# Patient Record
Sex: FEMALE | Race: WHITE | NOT HISPANIC OR LATINO | URBAN - METROPOLITAN AREA
[De-identification: names, ages, dates, MRNs, and addresses within clinical notes are randomized per-mention and may not be internally consistent; named-entity substitution may affect disease eponyms.]

---

## 2018-07-04 ENCOUNTER — EMERGENCY (EMERGENCY)
Facility: HOSPITAL | Age: 83
LOS: 1 days | Discharge: LEFT BEFORE DISCHARGE | End: 2018-07-04
Attending: EMERGENCY MEDICINE
Payer: MEDICARE

## 2018-07-04 VITALS
TEMPERATURE: 98 F | HEIGHT: 63 IN | SYSTOLIC BLOOD PRESSURE: 96 MMHG | OXYGEN SATURATION: 97 % | DIASTOLIC BLOOD PRESSURE: 58 MMHG | HEART RATE: 60 BPM | WEIGHT: 110.01 LBS | RESPIRATION RATE: 16 BRPM

## 2018-07-04 LAB
ALBUMIN SERPL ELPH-MCNC: 3.7 G/DL — SIGNIFICANT CHANGE UP (ref 3.5–5)
ALP SERPL-CCNC: 62 U/L — SIGNIFICANT CHANGE UP (ref 40–120)
ALT FLD-CCNC: 19 U/L DA — SIGNIFICANT CHANGE UP (ref 10–60)
ANION GAP SERPL CALC-SCNC: 12 MMOL/L — SIGNIFICANT CHANGE UP (ref 5–17)
APTT BLD: 36.9 SEC — SIGNIFICANT CHANGE UP (ref 27.5–37.4)
AST SERPL-CCNC: 24 U/L — SIGNIFICANT CHANGE UP (ref 10–40)
BASOPHILS # BLD AUTO: 0.2 K/UL — SIGNIFICANT CHANGE UP (ref 0–0.2)
BASOPHILS NFR BLD AUTO: 1.5 % — SIGNIFICANT CHANGE UP (ref 0–2)
BILIRUB SERPL-MCNC: 0.6 MG/DL — SIGNIFICANT CHANGE UP (ref 0.2–1.2)
BUN SERPL-MCNC: 32 MG/DL — HIGH (ref 7–18)
CALCIUM SERPL-MCNC: 9.2 MG/DL — SIGNIFICANT CHANGE UP (ref 8.4–10.5)
CHLORIDE SERPL-SCNC: 110 MMOL/L — HIGH (ref 96–108)
CK MB CFR SERPL CALC: 2.6 NG/ML — SIGNIFICANT CHANGE UP (ref 0–3.6)
CO2 SERPL-SCNC: 22 MMOL/L — SIGNIFICANT CHANGE UP (ref 22–31)
CREAT SERPL-MCNC: 1.61 MG/DL — HIGH (ref 0.5–1.3)
EOSINOPHIL # BLD AUTO: 0.2 K/UL — SIGNIFICANT CHANGE UP (ref 0–0.5)
EOSINOPHIL NFR BLD AUTO: 1.6 % — SIGNIFICANT CHANGE UP (ref 0–6)
GLUCOSE SERPL-MCNC: 108 MG/DL — HIGH (ref 70–99)
HCT VFR BLD CALC: 39.6 % — SIGNIFICANT CHANGE UP (ref 34.5–45)
HGB BLD-MCNC: 12.8 G/DL — SIGNIFICANT CHANGE UP (ref 11.5–15.5)
INR BLD: 0.96 RATIO — SIGNIFICANT CHANGE UP (ref 0.88–1.16)
LYMPHOCYTES # BLD AUTO: 1.9 K/UL — SIGNIFICANT CHANGE UP (ref 1–3.3)
LYMPHOCYTES # BLD AUTO: 19 % — SIGNIFICANT CHANGE UP (ref 13–44)
MAGNESIUM SERPL-MCNC: 2.2 MG/DL — SIGNIFICANT CHANGE UP (ref 1.6–2.6)
MCHC RBC-ENTMCNC: 30.6 PG — SIGNIFICANT CHANGE UP (ref 27–34)
MCHC RBC-ENTMCNC: 32.2 GM/DL — SIGNIFICANT CHANGE UP (ref 32–36)
MCV RBC AUTO: 94.8 FL — SIGNIFICANT CHANGE UP (ref 80–100)
MONOCYTES # BLD AUTO: 0.9 K/UL — SIGNIFICANT CHANGE UP (ref 0–0.9)
MONOCYTES NFR BLD AUTO: 8.8 % — SIGNIFICANT CHANGE UP (ref 2–14)
NEUTROPHILS # BLD AUTO: 6.9 K/UL — SIGNIFICANT CHANGE UP (ref 1.8–7.4)
NEUTROPHILS NFR BLD AUTO: 69.1 % — SIGNIFICANT CHANGE UP (ref 43–77)
NT-PROBNP SERPL-SCNC: 350 PG/ML — SIGNIFICANT CHANGE UP (ref 0–450)
PLATELET # BLD AUTO: 210 K/UL — SIGNIFICANT CHANGE UP (ref 150–400)
POTASSIUM SERPL-MCNC: 4 MMOL/L — SIGNIFICANT CHANGE UP (ref 3.5–5.3)
POTASSIUM SERPL-SCNC: 4 MMOL/L — SIGNIFICANT CHANGE UP (ref 3.5–5.3)
PROT SERPL-MCNC: 7.7 G/DL — SIGNIFICANT CHANGE UP (ref 6–8.3)
PROTHROM AB SERPL-ACNC: 10.5 SEC — SIGNIFICANT CHANGE UP (ref 9.8–12.7)
RBC # BLD: 4.17 M/UL — SIGNIFICANT CHANGE UP (ref 3.8–5.2)
RBC # FLD: 13.5 % — SIGNIFICANT CHANGE UP (ref 10.3–14.5)
SODIUM SERPL-SCNC: 144 MMOL/L — SIGNIFICANT CHANGE UP (ref 135–145)
TROPONIN I SERPL-MCNC: <0.015 NG/ML — SIGNIFICANT CHANGE UP (ref 0–0.04)
WBC # BLD: 10 K/UL — SIGNIFICANT CHANGE UP (ref 3.8–10.5)
WBC # FLD AUTO: 10 K/UL — SIGNIFICANT CHANGE UP (ref 3.8–10.5)

## 2018-07-04 PROCEDURE — 99284 EMERGENCY DEPT VISIT MOD MDM: CPT | Mod: 25

## 2018-07-04 PROCEDURE — 72125 CT NECK SPINE W/O DYE: CPT | Mod: 26

## 2018-07-04 PROCEDURE — 71045 X-RAY EXAM CHEST 1 VIEW: CPT

## 2018-07-04 PROCEDURE — 83735 ASSAY OF MAGNESIUM: CPT

## 2018-07-04 PROCEDURE — 93005 ELECTROCARDIOGRAM TRACING: CPT

## 2018-07-04 PROCEDURE — 70450 CT HEAD/BRAIN W/O DYE: CPT | Mod: 26

## 2018-07-04 PROCEDURE — 82962 GLUCOSE BLOOD TEST: CPT

## 2018-07-04 PROCEDURE — 70450 CT HEAD/BRAIN W/O DYE: CPT

## 2018-07-04 PROCEDURE — 83880 ASSAY OF NATRIURETIC PEPTIDE: CPT

## 2018-07-04 PROCEDURE — 80053 COMPREHEN METABOLIC PANEL: CPT

## 2018-07-04 PROCEDURE — 85730 THROMBOPLASTIN TIME PARTIAL: CPT

## 2018-07-04 PROCEDURE — 85610 PROTHROMBIN TIME: CPT

## 2018-07-04 PROCEDURE — 84484 ASSAY OF TROPONIN QUANT: CPT

## 2018-07-04 PROCEDURE — 93010 ELECTROCARDIOGRAM REPORT: CPT

## 2018-07-04 PROCEDURE — 71045 X-RAY EXAM CHEST 1 VIEW: CPT | Mod: 26

## 2018-07-04 PROCEDURE — 82553 CREATINE MB FRACTION: CPT

## 2018-07-04 PROCEDURE — 85027 COMPLETE CBC AUTOMATED: CPT

## 2018-07-04 PROCEDURE — 99285 EMERGENCY DEPT VISIT HI MDM: CPT

## 2018-07-04 PROCEDURE — 72125 CT NECK SPINE W/O DYE: CPT

## 2018-07-04 RX ORDER — SODIUM CHLORIDE 9 MG/ML
2000 INJECTION INTRAMUSCULAR; INTRAVENOUS; SUBCUTANEOUS ONCE
Qty: 0 | Refills: 0 | Status: DISCONTINUED | OUTPATIENT
Start: 2018-07-04 | End: 2018-07-08

## 2018-07-04 NOTE — ED PROVIDER NOTE - PHYSICAL EXAMINATION
Gen: Alert, NAD  Head: NC, AT   Eyes: PERRL, EOMI, normal lids/conjunctiva  ENT: normal hearing, patent oropharynx without erythema/exudate, uvula midline  Neck: supple, no tenderness, Trachea midline  Pulm: Bilateral BS, normal resp effort, no wheeze/stridor/retractions  CV: RRR, 2/6 systolic murmur best heard lusb. 2+ radial and dp pulses bl, no edema  Abd: soft, NT/ND, +BS, no hepatosplenomegaly  Mskel: extremities x4 with normal ROM and no joint effusions. no ctl spine ttp.   Skin: no rash, no bruising   Neuro: AAOx3, no sensory/motor deficits, CN 2-12 intact

## 2018-07-04 NOTE — ED PROVIDER NOTE - OBJECTIVE STATEMENT
Pertinent PMH/PSH/FHx/SHx and Review of Systems contained within:  86F hx htn on losartan and atenolol pw syncope or fall. per ems the patient had a syncope. but patient notes that she slipped on something. she denies hitting her head. patient reports no cp, sob, ha, vision change, dizziness, nausea, vomiting, fatigue, fever, chills, dysuria, abd pain. patient reports she was on her way to go shopping when this occurred and the  called 911  Fh and Sh not otherwise contributory  ROS otherwise negative

## 2018-07-04 NOTE — ED PROVIDER NOTE - PROGRESS NOTE DETAILS
patient appears to have eloped. I called her cell phone but not response. I called the son's emergency number, again no response.

## 2018-07-04 NOTE — ED PROVIDER NOTE - MEDICAL DECISION MAKING DETAILS
patient pw syncope or fall. given the disputed claims, will err on the side of caution and work her up for syncope given her age. I read her ekg as snius rate 55, no st elevation or depression, no qtc or pr prolongation, no axis deviation. Patient is notably hypotensive and so dehydration may have played a component in this.

## 2018-07-04 NOTE — ED ADULT NURSE REASSESSMENT NOTE - NS ED NURSE REASSESS COMMENT FT1
pt sheba Cannon supervisor made aware Tele. phone  called several times without AN ANSWER. SONS NO WAS ALSO CALLED  WITHOUT nd answer

## 2020-02-13 PROBLEM — Z00.00 ENCOUNTER FOR PREVENTIVE HEALTH EXAMINATION: Status: ACTIVE | Noted: 2020-02-13

## 2020-03-31 ENCOUNTER — APPOINTMENT (OUTPATIENT)
Dept: NEUROLOGY | Facility: CLINIC | Age: 85
End: 2020-03-31

## 2021-03-19 ENCOUNTER — INPATIENT (INPATIENT)
Facility: HOSPITAL | Age: 86
LOS: 3 days | Discharge: HOME | DRG: 309 | End: 2021-03-23
Attending: INTERNAL MEDICINE | Admitting: INTERNAL MEDICINE
Payer: COMMERCIAL

## 2021-03-19 VITALS
HEIGHT: 63 IN | DIASTOLIC BLOOD PRESSURE: 95 MMHG | RESPIRATION RATE: 16 BRPM | SYSTOLIC BLOOD PRESSURE: 193 MMHG | OXYGEN SATURATION: 96 % | HEART RATE: 86 BPM

## 2021-03-19 DIAGNOSIS — Z29.9 ENCOUNTER FOR PROPHYLACTIC MEASURES, UNSPECIFIED: ICD-10-CM

## 2021-03-19 DIAGNOSIS — R55 SYNCOPE AND COLLAPSE: ICD-10-CM

## 2021-03-19 DIAGNOSIS — I48.91 UNSPECIFIED ATRIAL FIBRILLATION: ICD-10-CM

## 2021-03-19 DIAGNOSIS — I10 ESSENTIAL (PRIMARY) HYPERTENSION: ICD-10-CM

## 2021-03-19 LAB
ALBUMIN SERPL ELPH-MCNC: 3.2 G/DL — LOW (ref 3.5–5)
ALP SERPL-CCNC: 73 U/L — SIGNIFICANT CHANGE UP (ref 40–120)
ALT FLD-CCNC: 18 U/L DA — SIGNIFICANT CHANGE UP (ref 10–60)
ANION GAP SERPL CALC-SCNC: 9 MMOL/L — SIGNIFICANT CHANGE UP (ref 5–17)
APPEARANCE UR: CLEAR — SIGNIFICANT CHANGE UP
AST SERPL-CCNC: 20 U/L — SIGNIFICANT CHANGE UP (ref 10–40)
BASOPHILS # BLD AUTO: 0.08 K/UL — SIGNIFICANT CHANGE UP (ref 0–0.2)
BASOPHILS NFR BLD AUTO: 0.7 % — SIGNIFICANT CHANGE UP (ref 0–2)
BILIRUB SERPL-MCNC: 0.4 MG/DL — SIGNIFICANT CHANGE UP (ref 0.2–1.2)
BILIRUB UR-MCNC: NEGATIVE — SIGNIFICANT CHANGE UP
BUN SERPL-MCNC: 19 MG/DL — HIGH (ref 7–18)
CALCIUM SERPL-MCNC: 9 MG/DL — SIGNIFICANT CHANGE UP (ref 8.4–10.5)
CHLORIDE SERPL-SCNC: 109 MMOL/L — HIGH (ref 96–108)
CO2 SERPL-SCNC: 23 MMOL/L — SIGNIFICANT CHANGE UP (ref 22–31)
COLOR SPEC: YELLOW — SIGNIFICANT CHANGE UP
CREAT SERPL-MCNC: 1.02 MG/DL — SIGNIFICANT CHANGE UP (ref 0.5–1.3)
DIFF PNL FLD: ABNORMAL
EOSINOPHIL # BLD AUTO: 0.08 K/UL — SIGNIFICANT CHANGE UP (ref 0–0.5)
EOSINOPHIL NFR BLD AUTO: 0.7 % — SIGNIFICANT CHANGE UP (ref 0–6)
GLUCOSE SERPL-MCNC: 96 MG/DL — SIGNIFICANT CHANGE UP (ref 70–99)
GLUCOSE UR QL: NEGATIVE — SIGNIFICANT CHANGE UP
HCT VFR BLD CALC: 38.6 % — SIGNIFICANT CHANGE UP (ref 34.5–45)
HGB BLD-MCNC: 12.5 G/DL — SIGNIFICANT CHANGE UP (ref 11.5–15.5)
IMM GRANULOCYTES NFR BLD AUTO: 1 % — SIGNIFICANT CHANGE UP (ref 0–1.5)
KETONES UR-MCNC: ABNORMAL
LEUKOCYTE ESTERASE UR-ACNC: NEGATIVE — SIGNIFICANT CHANGE UP
LYMPHOCYTES # BLD AUTO: 1.71 K/UL — SIGNIFICANT CHANGE UP (ref 1–3.3)
LYMPHOCYTES # BLD AUTO: 15.5 % — SIGNIFICANT CHANGE UP (ref 13–44)
MCHC RBC-ENTMCNC: 29.5 PG — SIGNIFICANT CHANGE UP (ref 27–34)
MCHC RBC-ENTMCNC: 32.4 GM/DL — SIGNIFICANT CHANGE UP (ref 32–36)
MCV RBC AUTO: 91 FL — SIGNIFICANT CHANGE UP (ref 80–100)
MONOCYTES # BLD AUTO: 0.81 K/UL — SIGNIFICANT CHANGE UP (ref 0–0.9)
MONOCYTES NFR BLD AUTO: 7.4 % — SIGNIFICANT CHANGE UP (ref 2–14)
NEUTROPHILS # BLD AUTO: 8.22 K/UL — HIGH (ref 1.8–7.4)
NEUTROPHILS NFR BLD AUTO: 74.7 % — SIGNIFICANT CHANGE UP (ref 43–77)
NITRITE UR-MCNC: NEGATIVE — SIGNIFICANT CHANGE UP
NRBC # BLD: 0 /100 WBCS — SIGNIFICANT CHANGE UP (ref 0–0)
PH UR: 6 — SIGNIFICANT CHANGE UP (ref 5–8)
PLATELET # BLD AUTO: 214 K/UL — SIGNIFICANT CHANGE UP (ref 150–400)
POTASSIUM SERPL-MCNC: 4.2 MMOL/L — SIGNIFICANT CHANGE UP (ref 3.5–5.3)
POTASSIUM SERPL-SCNC: 4.2 MMOL/L — SIGNIFICANT CHANGE UP (ref 3.5–5.3)
PROT SERPL-MCNC: 7.2 G/DL — SIGNIFICANT CHANGE UP (ref 6–8.3)
PROT UR-MCNC: 30 MG/DL
RBC # BLD: 4.24 M/UL — SIGNIFICANT CHANGE UP (ref 3.8–5.2)
RBC # FLD: 13 % — SIGNIFICANT CHANGE UP (ref 10.3–14.5)
SARS-COV-2 RNA SPEC QL NAA+PROBE: SIGNIFICANT CHANGE UP
SODIUM SERPL-SCNC: 141 MMOL/L — SIGNIFICANT CHANGE UP (ref 135–145)
SP GR SPEC: 1.02 — SIGNIFICANT CHANGE UP (ref 1.01–1.02)
TROPONIN I SERPL-MCNC: <0.015 NG/ML — SIGNIFICANT CHANGE UP (ref 0–0.04)
TSH SERPL-MCNC: 3.32 UU/ML — SIGNIFICANT CHANGE UP (ref 0.34–4.82)
UROBILINOGEN FLD QL: NEGATIVE — SIGNIFICANT CHANGE UP
WBC # BLD: 11.01 K/UL — HIGH (ref 3.8–10.5)
WBC # FLD AUTO: 11.01 K/UL — HIGH (ref 3.8–10.5)

## 2021-03-19 PROCEDURE — 99285 EMERGENCY DEPT VISIT HI MDM: CPT

## 2021-03-19 PROCEDURE — 93010 ELECTROCARDIOGRAM REPORT: CPT

## 2021-03-19 PROCEDURE — 70450 CT HEAD/BRAIN W/O DYE: CPT | Mod: 26

## 2021-03-19 PROCEDURE — 93880 EXTRACRANIAL BILAT STUDY: CPT | Mod: 26

## 2021-03-19 PROCEDURE — 72125 CT NECK SPINE W/O DYE: CPT | Mod: 26

## 2021-03-19 PROCEDURE — 99223 1ST HOSP IP/OBS HIGH 75: CPT

## 2021-03-19 RX ORDER — HALOPERIDOL DECANOATE 100 MG/ML
0.5 INJECTION INTRAMUSCULAR ONCE
Refills: 0 | Status: COMPLETED | OUTPATIENT
Start: 2021-03-19 | End: 2021-03-19

## 2021-03-19 RX ORDER — ATENOLOL 25 MG/1
50 TABLET ORAL DAILY
Refills: 0 | Status: DISCONTINUED | OUTPATIENT
Start: 2021-03-19 | End: 2021-03-23

## 2021-03-19 RX ORDER — DONEPEZIL HYDROCHLORIDE 10 MG/1
5 TABLET, FILM COATED ORAL AT BEDTIME
Refills: 0 | Status: DISCONTINUED | OUTPATIENT
Start: 2021-03-19 | End: 2021-03-23

## 2021-03-19 RX ORDER — ENOXAPARIN SODIUM 100 MG/ML
40 INJECTION SUBCUTANEOUS DAILY
Refills: 0 | Status: DISCONTINUED | OUTPATIENT
Start: 2021-03-19 | End: 2021-03-23

## 2021-03-19 RX ORDER — RISPERIDONE 4 MG/1
1 TABLET ORAL
Refills: 0 | Status: DISCONTINUED | OUTPATIENT
Start: 2021-03-19 | End: 2021-03-23

## 2021-03-19 RX ORDER — VALSARTAN 80 MG/1
160 TABLET ORAL DAILY
Refills: 0 | Status: DISCONTINUED | OUTPATIENT
Start: 2021-03-19 | End: 2021-03-21

## 2021-03-19 RX ORDER — SODIUM CHLORIDE 9 MG/ML
1000 INJECTION INTRAMUSCULAR; INTRAVENOUS; SUBCUTANEOUS ONCE
Refills: 0 | Status: COMPLETED | OUTPATIENT
Start: 2021-03-19 | End: 2021-03-19

## 2021-03-19 RX ORDER — ASPIRIN/CALCIUM CARB/MAGNESIUM 324 MG
81 TABLET ORAL DAILY
Refills: 0 | Status: DISCONTINUED | OUTPATIENT
Start: 2021-03-19 | End: 2021-03-23

## 2021-03-19 RX ORDER — ONDANSETRON 8 MG/1
4 TABLET, FILM COATED ORAL ONCE
Refills: 0 | Status: COMPLETED | OUTPATIENT
Start: 2021-03-19 | End: 2021-03-19

## 2021-03-19 RX ORDER — HYDRALAZINE HCL 50 MG
5 TABLET ORAL ONCE
Refills: 0 | Status: COMPLETED | OUTPATIENT
Start: 2021-03-19 | End: 2021-03-19

## 2021-03-19 RX ORDER — HALOPERIDOL DECANOATE 100 MG/ML
1 INJECTION INTRAMUSCULAR ONCE
Refills: 0 | Status: COMPLETED | OUTPATIENT
Start: 2021-03-19 | End: 2021-03-19

## 2021-03-19 RX ADMIN — Medication 5 MILLIGRAM(S): at 12:03

## 2021-03-19 RX ADMIN — SODIUM CHLORIDE 1000 MILLILITER(S): 9 INJECTION INTRAMUSCULAR; INTRAVENOUS; SUBCUTANEOUS at 08:40

## 2021-03-19 RX ADMIN — HALOPERIDOL DECANOATE 0.5 MILLIGRAM(S): 100 INJECTION INTRAMUSCULAR at 14:30

## 2021-03-19 RX ADMIN — ONDANSETRON 4 MILLIGRAM(S): 8 TABLET, FILM COATED ORAL at 10:19

## 2021-03-19 RX ADMIN — RISPERIDONE 1 MILLIGRAM(S): 4 TABLET ORAL at 17:42

## 2021-03-19 RX ADMIN — ENOXAPARIN SODIUM 40 MILLIGRAM(S): 100 INJECTION SUBCUTANEOUS at 11:24

## 2021-03-19 RX ADMIN — SODIUM CHLORIDE 1000 MILLILITER(S): 9 INJECTION INTRAMUSCULAR; INTRAVENOUS; SUBCUTANEOUS at 11:53

## 2021-03-19 RX ADMIN — HALOPERIDOL DECANOATE 1 MILLIGRAM(S): 100 INJECTION INTRAMUSCULAR at 11:22

## 2021-03-19 RX ADMIN — DONEPEZIL HYDROCHLORIDE 5 MILLIGRAM(S): 10 TABLET, FILM COATED ORAL at 21:28

## 2021-03-19 NOTE — CONSULT NOTE ADULT - SUBJECTIVE AND OBJECTIVE BOX
Pt is demented, providing limited information.  History per Admission H&P earlier today.    <Start of quotes from H&P>  "Reason for Admission: Fall/Syncope  History of Present Illness:   89 yr F from home with PMHx of HTN, ?Afib, dementia presents to ED after fall vs Syncope. Pt has baseline dementia therefore unable to provide details of the event; unable to reach family. Pt states she thinks she slipped on dust in her kitchen but isn't sure what happened. Pt states her neighbor called 911. Pt states she was in usual state of health when she went to sleep. Pt denies any pain anywhere in the body, headache, numbness, tingling, weakness, chest pain, SOB, palpitations, N/V/D abdominal pain, urinary symptoms or any other acute complaints.     In ED, /85, HR 92     Review of Systems:  Other Review of Systems: All other review of systems negative, except as noted in HPI    Allergies and Intolerances:        Allergies:  	No Known Allergies:     Home Medications:   * Patient Currently Takes Medications as of 19-Mar-2021 10:38 documented in Structured Notes  · 	donepezil 5 mg oral tablet: 1 tab(s) orally once a day (at bedtime)  · 	atenolol 50 mg oral tablet: 1 tab(s) orally once a day  · 	risperiDONE 1 mg oral tablet: 1 tab(s) orally 2 times a day  · 	valsartan 160 mg oral tablet: 1 tab(s) orally once a day    Patient History:    Past Medical, Past Surgical, and Family History:  PAST MEDICAL HISTORY:  HTN (hypertension).     Social History:  Social History (marital status, living situation, occupation, tobacco use, alcohol and drug use, and sexual history): Denies smoking, alcohol or illicit drug use     Tobacco Screening:  · Core Measure Site	Yes  · Has the patient used tobacco in the past 30 days?	No  . . .   Physical Exam: General - NAD  . . .   Neurological – Alert and oriented x 3, CN 2-12 grossly intact  . . . "  <End of quotes from H&P>      Of interest is the following from the ED Provider note of 7/4/2018, the only other time Pt was in hospital in [facility]:    ". . .   Chief Complaint: The patient is a 86y Female complaining of syncope.  HPI Objective Statement: Pertinent PMH/PSH/FHx/SHx and Review of Systems contained within:  	86F hx htn on losartan and atenolol pw syncope or fall. per ems the patient had a syncope. but patient notes that she slipped on something. she denies hitting her head. patient reports no cp, sob, ha, vision change, dizziness, nausea, vomiting, fatigue, fever, chills, dysuria, abd pain. patient reports she was on her way to go shopping when this occurred and the  called 911  Fh and Sh not otherwise contributory  . . .  Neuro: AAOx3, no sensory/motor deficits, CN 2-12 intact  . . .  Progress: patient appears to have eloped. I called her cell phone but not response. I called the son's emergency number, again no response.  . . .   Medical Decision Making Details: patient pw syncope or fall. given the disputed claims, will err on the side of caution and work her up for syncope given her age. I read her ekg as snius rate 55, no st elevation or depression, no qtc or pr prolongation, no axis deviation. Patient is notably hypotensive and so dehydration may have played a component in this  . . ."      Per radiology report of head and c-spine CTs:  "COMPARISON: CT head and C-spine 7/4/2018.    FINDINGS:    CT HEAD:  No acute transcortical infarct or intracranial hemorrhage.    Confluent white matter hypoattenuating foci are noted, nonspecific but likely representing small vessel disease.    The ventricles are normal without evidence of hydrocephalus. There are no extra-axial fluid collections.    Status post right cataract surgery. Orbits are otherwise unremarkable. The imaged portions of the paranasal sinuses are clear. The mastoid air cells are clear. The visualized soft tissues and osseous structures appear normal.    CT CERVICAL SPINE:  No acute fracture or acute subluxation.  Multilevel degenerative changes. Disc osteophyte complex most prominent at C4-C5, indenting the ventral cord, grossly unchanged. MRI may be obtained, if the patient is MRI compatible if further information regarding spinal canal soft tissue contents is required."      TSH 4.97  3.32  Tot prot/alb  7.3/3.4      EXAMINATION     Pt is demented, providing limited information.  History per Admission H&P earlier today.    <Start of quotes from H&P>  "Reason for Admission: Fall/Syncope  History of Present Illness:   89 yr F from home with PMHx of HTN, ?Afib, dementia presents to ED after fall vs Syncope. Pt has baseline dementia therefore unable to provide details of the event; unable to reach family. Pt states she thinks she slipped on dust in her kitchen but isn't sure what happened. Pt states her neighbor called 911. Pt states she was in usual state of health when she went to sleep. Pt denies any pain anywhere in the body, headache, numbness, tingling, weakness, chest pain, SOB, palpitations, N/V/D abdominal pain, urinary symptoms or any other acute complaints.     In ED, /85, HR 92     Review of Systems:  Other Review of Systems: All other review of systems negative, except as noted in HPI    Allergies and Intolerances:        Allergies:  	No Known Allergies:     Home Medications:   * Patient Currently Takes Medications as of 19-Mar-2021 10:38 documented in Structured Notes  · 	donepezil 5 mg oral tablet: 1 tab(s) orally once a day (at bedtime)  · 	atenolol 50 mg oral tablet: 1 tab(s) orally once a day  · 	risperiDONE 1 mg oral tablet: 1 tab(s) orally 2 times a day  · 	valsartan 160 mg oral tablet: 1 tab(s) orally once a day    Patient History:    Past Medical, Past Surgical, and Family History:  PAST MEDICAL HISTORY:  HTN (hypertension).     Social History:  Social History (marital status, living situation, occupation, tobacco use, alcohol and drug use, and sexual history): Denies smoking, alcohol or illicit drug use     Tobacco Screening:  · Core Measure Site	Yes  · Has the patient used tobacco in the past 30 days?	No  . . .   Physical Exam: General - NAD  . . .   Neurological – Alert and oriented x 3, CN 2-12 grossly intact  . . . "  <End of quotes from H&P>      Of interest is the following from the ED Provider note of 7/4/2018, the only other time Pt was in hospital in [facility]:    ". . .   Chief Complaint: The patient is a 86y Female complaining of syncope.  HPI Objective Statement: Pertinent PMH/PSH/FHx/SHx and Review of Systems contained within:  	86F hx htn on losartan and atenolol pw syncope or fall. per ems the patient had a syncope. but patient notes that she slipped on something. she denies hitting her head. patient reports no cp, sob, ha, vision change, dizziness, nausea, vomiting, fatigue, fever, chills, dysuria, abd pain. patient reports she was on her way to go shopping when this occurred and the  called 911  Fh and Sh not otherwise contributory  . . .  Neuro: AAOx3, no sensory/motor deficits, CN 2-12 intact  . . .  Progress: patient appears to have eloped. I called her cell phone but not response. I called the son's emergency number, again no response.  . . .   Medical Decision Making Details: patient pw syncope or fall. given the disputed claims, will err on the side of caution and work her up for syncope given her age. I read her ekg as snius rate 55, no st elevation or depression, no qtc or pr prolongation, no axis deviation. Patient is notably hypotensive and so dehydration may have played a component in this  . . ."      Per radiology report of head and c-spine CTs:  "COMPARISON: CT head and C-spine 7/4/2018.    FINDINGS:    CT HEAD:  No acute transcortical infarct or intracranial hemorrhage.    Confluent white matter hypoattenuating foci are noted, nonspecific but likely representing small vessel disease.    The ventricles are normal without evidence of hydrocephalus. There are no extra-axial fluid collections.    Status post right cataract surgery. Orbits are otherwise unremarkable. The imaged portions of the paranasal sinuses are clear. The mastoid air cells are clear. The visualized soft tissues and osseous structures appear normal.    CT CERVICAL SPINE:  No acute fracture or acute subluxation.  Multilevel degenerative changes. Disc osteophyte complex most prominent at C4-C5, indenting the ventral cord, grossly unchanged. MRI may be obtained, if the patient is MRI compatible if further information regarding spinal canal soft tissue contents is required."      TSH 4.97  3.32  Tot prot/alb  7.3/3.4      EXAMINATION    Thin, emaciated.  Found asleep, supine in bed.  Arousable.  Taciturn.  Marked psychomotor retardation.  Weak voice.  Gives her name in piecemeal fashion.  Responds that she feels "OK."  When asked her age says "I don't give my age."  Asked where we are, she pauses then says "looking for . . . . . "  Current year = "oh."  Pupils 3/3.5 mm on R/L, min reactive.       Symmetric generalized marked weakness of limb muscles.      Reflex                           Right    Left   Comment    Biceps                             3         3  low amplitude  Triceps                            3         3  low amplitude  [place]                       absent  absent  Patellar                          ?0       ?0  Gastroc                           0         0  Plantar                         flexor   flexor      Sensory testing not feasible due to cognitive impairment.     [place] not want to try to sit up.  Falls backwards when passively sat up in bed (due to instability or to lack ot co-operation?).     Constant symmetric synchronous 5 Hz rhythmic flex/ext tremor at wrists/elbows.  Tremor at ankles during passive movement of the LEs.

## 2021-03-19 NOTE — ED PROVIDER NOTE - OBJECTIVE STATEMENT
89 year old female PMH HTN coming in s/p fall vs. syncope. pt states she thinks she slipped on dust in her kitchen but then isn't sure what happened. [facility] neighbor called 911. at this time denies all complaints.  was in usual state of health when she went to sleep. denies headache, numbness, tingling, weakness, cp, sob, palpitations, msk pain, urinary complaints, abd pains, N/v/D/C.

## 2021-03-19 NOTE — H&P ADULT - HISTORY OF PRESENT ILLNESS
89 yr F from home with PMHx of HTN, ?Afib, dementia presents to ED after fall vs Syncope. Pt has baseline dementia therefore unable to provide details of the event; unable to reach family. Pt states she thinks she slipped on dust in her kitchen but isn't sure what happened. Pt states her neighbor called 911. Pt states she was in usual state of health when she went to sleep. Pt denies any pain anywhere in the body, headache, numbness, tingling, weakness, chest pain, SOB, palpitations, N/V/D abdominal pain, urinary symptoms or any other acute complaints.     In ED, /85, HR 92

## 2021-03-19 NOTE — ED PROVIDER NOTE - PHYSICAL EXAMINATION
no signs of acute head trauma  no midline ttp neck or back  tremor in b/l hands.  full ROM all joints with no pain.  no bony ttp in extremities.  PERRLA. EOMI b/l.

## 2021-03-19 NOTE — ED PROVIDER NOTE - PROGRESS NOTE DETAILS
BARRY: signed out to me by Dr. Everett. Possible syncope. Pending CT head and C-spine, EKG. Plan to admit. Patient is resting comfortably, NAD. Patient oriented to person but not place or time. Tremulous. Does not recall home meds or PMD. Tried to call emergency contact - number out of service. Called number listed for patient and left a voicemail. Endorsed to Dr. Huerta.

## 2021-03-19 NOTE — H&P ADULT - PROBLEM SELECTOR PLAN 2
Unknown if pt has a H/O Afib   EKG shows Atrial fibrillation  Troponin negative  Tele monitoring  c/w Atenolol  Cardio Dr. Garcia Unknown if pt has a H/O Afib, pt said she halways had irregular HR   EKG shows Atrial fibrillation  Troponin negative  Tele monitoring  c/w Atenolol  Not on any AC  Cardio Dr. Garcia Unknown if pt has a H/O Afib, pt said she halways had irregular HR   EKG shows Atrial fibrillation  CHADVASc score 4 (holding AC due to fall risk, risks outweigh benefit)  Troponin negative  Tele monitoring  c/w Atenolol  Not on any AC  Cardio Dr. Garcia

## 2021-03-19 NOTE — H&P ADULT - PROBLEM SELECTOR PLAN 1
p/w ?Syncope vs Fall  CT head: no acute changes  CT C spine: no acute fracture  EKG shows Atrial fibrillation  Troponin negative  Tele monitoring  f/u US Carotid doppler  f/u Echo   f/u Orthostatic vitals  Neuro Dr. Murillo  Cardio Dr. Garcia  PT evaluation p/w ?Syncope vs Fall  CT head: no acute changes  CT C spine: no acute fracture  EKG shows Atrial fibrillation  Troponin negative  Tele monitoring  f/u US Carotid doppler  f/u Echo   f/u Orthostatic vitals  Neuro Dr. Murillo  PT evaluation

## 2021-03-19 NOTE — CONSULT NOTE ADULT - ASSESSMENT
1) Persistent Atrial Fibrillation  - Continue atenolol  - Patient noted not on anticoagulation. Unclear if new onset afib or known afib. However in the setting of recurrent falls, and noted tremors on exam(parkinsonian from antipsychotics?) would continue to hold on anticoagulation for now. Patient also noted to be confused, climbing out of bed. Risks of anticoagulation at this moment would outweigh benefits.  - Patient has a CHADSVASC Score of at least 4.   - Obtain echocardiogram    2) Fall  - Noted with resting tremors of bilateral upper extremities  - Suspect due to antipsychotic, risperidone?  - Patient continues to be agitated, given haldol in ED

## 2021-03-19 NOTE — CONSULT NOTE ADULT - SUBJECTIVE AND OBJECTIVE BOX
CHIEF COMPLAINT & BRIEF HOSPITAL COURSE:  89 yr F from home with PMHx of HTN, ?Afib, dementia presents to ED after fall vs Syncope. Pt has baseline dementia therefore unable to provide details of the event; unable to reach family. Pt states she thinks she slipped on dust in her kitchen but isn't sure what happened. Pt states her neighbor called 911. Pt states she was in usual state of health when she went to sleep. Pt denies any pain anywhere in the body, headache, numbness, tingling, weakness, chest pain, SOB, palpitations, N/V/D abdominal pain, urinary symptoms or any other acute complaints.     In ED, /85, HR 92    INTERVAL HPI/OVERNIGHT EVENTS:   Patient's BP improved with hydralazine push and resuming home medications. Attempted to obtain history however patient appeared paranoid, answering minimal questions, asking "why." However patient does not seem to remember her circumstances regarding her admission.     REVIEW OF SYSTEMS:  Unable to assess    Vital Signs Last 24 Hrs  T(C): 36.3 (19 Mar 2021 15:48), Max: 36.7 (19 Mar 2021 11:32)  T(F): 97.3 (19 Mar 2021 15:48), Max: 98 (19 Mar 2021 11:32)  HR: 76 (19 Mar 2021 15:48) (70 - 92)  BP: 161/61 (19 Mar 2021 15:48) (140/65 - 193/95)  BP(mean): --  RR: 18 (19 Mar 2021 15:48) (16 - 20)  SpO2: 98% (19 Mar 2021 15:48) (95% - 99%)    PHYSICAL EXAMINATION:  GENERAL: frail elderly, confused, Has bilateral resting tremors(antiparkinsonian effects from antipsychotics?)  NECK: Supple, No JVD, Normal thyroid  CHEST/LUNG: clear to auscultation bilaterally  HEART: irregular, rate controlled  ABDOMEN: Soft, Nontender, Nondistended; Bowel sounds present  NERVOUS SYSTEM:  Alert & Oriented X 1-2  EXTREMITIES:  2+ Peripheral Pulses, No clubbing, cyanosis, or edema  SKIN: warm dry                          12.5   11.01 )-----------( 214      ( 19 Mar 2021 06:38 )             38.6     03-19    141  |  109<H>  |  19<H>  ----------------------------<  96  4.2   |  23  |  1.02    Ca    9.0      19 Mar 2021 06:38    TPro  7.2  /  Alb  3.2<L>  /  TBili  0.4  /  DBili  x   /  AST  20  /  ALT  18  /  AlkPhos  73  03-19    LIVER FUNCTIONS - ( 19 Mar 2021 06:38 )  Alb: 3.2 g/dL / Pro: 7.2 g/dL / ALK PHOS: 73 U/L / ALT: 18 U/L DA / AST: 20 U/L / GGT: x           CARDIAC MARKERS ( 19 Mar 2021 06:38 )  <0.015 ng/mL / x     / x     / x     / x              CAPILLARY BLOOD GLUCOSE      RADIOLOGY & ADDITIONAL TESTS:

## 2021-03-19 NOTE — ED PROVIDER NOTE - CLINICAL SUMMARY MEDICAL DECISION MAKING FREE TEXT BOX
89 year old female s/p fall vs. syncope. hypertensive. PE as above.  labs, UA, ct head/cervical spine, ecg, reassess

## 2021-03-19 NOTE — CONSULT NOTE ADULT - ASSESSMENT
To be completed        B12, folate, methylmalonic acid, homocysteine, SPEP, ESR, CRP, MARIELA, thiamine level, syphilis serology.  Dementia, unknown duration, unknown prior work-up if any.  Given the nebulous answers she gave percy last seen at [facility], in 2018, she was already cognitively impaired if not outright demented, then.      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       Negativistic behavior on this exam; likely was also negativistic in 2018 given that she eloped after arriving here having falling down.      Hypothyroidism.      Reportedly "Alert and oriented x 3, CN 2-12 grossly intact" in ED not long before this evaluation.         RECOMMENDATIONS    B12, folate, methylmalonic acid, homocysteine, SPEP, ESR, CRP, MARIELA, thiamine level, syphilis serology, FT3, FT4, ammonia, GGT.     Non-con MRI c-spine (may need sedation). Dementia, unknown duration, unknown prior work-up if any.  Given the nebulous answers she gave when last seen at [facility], in 2018, she was already cognitively impaired if not outright demented, then.      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       Negativistic behavior on this exam; likely was also negativistic in 2018 given that she eloped after arriving here having falling down.      Hypothyroidism.      Reportedly "Alert and oriented x 3, CN 2-12 grossly intact" in ED not long before this evaluation.         RECOMMENDATIONS    B12, folate, methylmalonic acid, homocysteine, SPEP, ESR, CRP, MARIELA, thiamine level, syphilis serology, FT3, FT4, ammonia, GGT.     Non-con MRI c-spine (may need sedation). Dementia, unknown duration, unknown prior work-up if any.  Given the nebulous answers she gave when last seen at [facility], in 2018, she was already cognitively impaired if not outright demented, then.      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       Negativistic behavior on this exam; likely was also negativistic in 2018 given that she eloped after arriving here having falling down.      Hypothyroidism.      Reportedly "Alert and oriented x 3, CN 2-12 grossly intact" in ED not long before this evaluation.         RECOMMENDATIONS    B12, folate, methylmalonic acid, homocysteine, SPEP, ESR, CRP, MARIELA, thiamine level, syphilis serology, FT3, FT4, ammonia, GGT (I have ordered these).     Non-con MRI c-spine (may need sedation). Dementia, unknown duration, unknown prior work-up if any.  Given the nebulous answers she gave when last seen at [facility], in 2018, she was already cognitively impaired if not outright demented, then.      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       Negativistic behavior on this exam; likely was also negativistic in 2018 given that she eloped after arriving here having falling down.      Hypothyroidism.      Malnutrition.    Reportedly "Alert and oriented x 3, CN 2-12 grossly intact" in ED not long before this evaluation.         RECOMMENDATIONS    B12, folate, methylmalonic acid, homocysteine, SPEP, ESR, CRP, MARIELA, thiamine level, syphilis serology, FT3, FT4, ammonia, GGT (I have ordered these).     Non-con MRI c-spine (may need sedation).

## 2021-03-19 NOTE — H&P ADULT - ASSESSMENT
89 yr F from home with PMHx of HTN, ?Afib, dementia presents to ED after fall vs Syncope.     In ED, /85, HR 92      Pt admitted for Syncope

## 2021-03-20 LAB
ALBUMIN SERPL ELPH-MCNC: 3.4 G/DL — LOW (ref 3.5–5)
ALP SERPL-CCNC: 77 U/L — SIGNIFICANT CHANGE UP (ref 40–120)
ALT FLD-CCNC: 18 U/L DA — SIGNIFICANT CHANGE UP (ref 10–60)
AMMONIA BLD-MCNC: 21 UMOL/L — SIGNIFICANT CHANGE UP (ref 11–32)
ANION GAP SERPL CALC-SCNC: 9 MMOL/L — SIGNIFICANT CHANGE UP (ref 5–17)
AST SERPL-CCNC: 25 U/L — SIGNIFICANT CHANGE UP (ref 10–40)
BASOPHILS # BLD AUTO: 0.06 K/UL — SIGNIFICANT CHANGE UP (ref 0–0.2)
BASOPHILS NFR BLD AUTO: 0.5 % — SIGNIFICANT CHANGE UP (ref 0–2)
BILIRUB SERPL-MCNC: 0.6 MG/DL — SIGNIFICANT CHANGE UP (ref 0.2–1.2)
BUN SERPL-MCNC: 16 MG/DL — SIGNIFICANT CHANGE UP (ref 7–18)
CALCIUM SERPL-MCNC: 9.1 MG/DL — SIGNIFICANT CHANGE UP (ref 8.4–10.5)
CHLORIDE SERPL-SCNC: 105 MMOL/L — SIGNIFICANT CHANGE UP (ref 96–108)
CHOLEST SERPL-MCNC: 277 MG/DL — HIGH
CO2 SERPL-SCNC: 26 MMOL/L — SIGNIFICANT CHANGE UP (ref 22–31)
COVID-19 SPIKE DOMAIN AB INTERP: NEGATIVE — SIGNIFICANT CHANGE UP
COVID-19 SPIKE DOMAIN ANTIBODY RESULT: 0.4 U/ML — SIGNIFICANT CHANGE UP
CREAT SERPL-MCNC: 1.12 MG/DL — SIGNIFICANT CHANGE UP (ref 0.5–1.3)
CRP SERPL-MCNC: 7 MG/L — HIGH
CULTURE RESULTS: SIGNIFICANT CHANGE UP
EOSINOPHIL # BLD AUTO: 0.04 K/UL — SIGNIFICANT CHANGE UP (ref 0–0.5)
EOSINOPHIL NFR BLD AUTO: 0.3 % — SIGNIFICANT CHANGE UP (ref 0–6)
ERYTHROCYTE [SEDIMENTATION RATE] IN BLOOD: 23 MM/HR — HIGH (ref 0–20)
FERRITIN SERPL-MCNC: 333 NG/ML — HIGH (ref 15–150)
FOLATE SERPL-MCNC: >20 NG/ML — SIGNIFICANT CHANGE UP
FOLATE SERPL-MCNC: >20 NG/ML — SIGNIFICANT CHANGE UP
GGT SERPL-CCNC: 7 U/L — LOW (ref 8–40)
GLUCOSE SERPL-MCNC: 100 MG/DL — HIGH (ref 70–99)
HCT VFR BLD CALC: 37.2 % — SIGNIFICANT CHANGE UP (ref 34.5–45)
HDLC SERPL-MCNC: 105 MG/DL — SIGNIFICANT CHANGE UP
HGB BLD-MCNC: 12.5 G/DL — SIGNIFICANT CHANGE UP (ref 11.5–15.5)
IMM GRANULOCYTES NFR BLD AUTO: 0.6 % — SIGNIFICANT CHANGE UP (ref 0–1.5)
LIPID PNL WITH DIRECT LDL SERPL: 155 MG/DL — HIGH
LYMPHOCYTES # BLD AUTO: 1.48 K/UL — SIGNIFICANT CHANGE UP (ref 1–3.3)
LYMPHOCYTES # BLD AUTO: 12.8 % — LOW (ref 13–44)
MAGNESIUM SERPL-MCNC: 2.1 MG/DL — SIGNIFICANT CHANGE UP (ref 1.6–2.6)
MCHC RBC-ENTMCNC: 30.6 PG — SIGNIFICANT CHANGE UP (ref 27–34)
MCHC RBC-ENTMCNC: 33.6 GM/DL — SIGNIFICANT CHANGE UP (ref 32–36)
MCV RBC AUTO: 91 FL — SIGNIFICANT CHANGE UP (ref 80–100)
MONOCYTES # BLD AUTO: 1.12 K/UL — HIGH (ref 0–0.9)
MONOCYTES NFR BLD AUTO: 9.7 % — SIGNIFICANT CHANGE UP (ref 2–14)
NEUTROPHILS # BLD AUTO: 8.78 K/UL — HIGH (ref 1.8–7.4)
NEUTROPHILS NFR BLD AUTO: 76.1 % — SIGNIFICANT CHANGE UP (ref 43–77)
NON HDL CHOLESTEROL: 172 MG/DL — HIGH
NRBC # BLD: 0 /100 WBCS — SIGNIFICANT CHANGE UP (ref 0–0)
PHOSPHATE SERPL-MCNC: 3 MG/DL — SIGNIFICANT CHANGE UP (ref 2.5–4.5)
PLATELET # BLD AUTO: 216 K/UL — SIGNIFICANT CHANGE UP (ref 150–400)
POTASSIUM SERPL-MCNC: 3.5 MMOL/L — SIGNIFICANT CHANGE UP (ref 3.5–5.3)
POTASSIUM SERPL-SCNC: 3.5 MMOL/L — SIGNIFICANT CHANGE UP (ref 3.5–5.3)
PROT SERPL-MCNC: 7.3 G/DL — SIGNIFICANT CHANGE UP (ref 6–8.3)
RBC # BLD: 4.09 M/UL — SIGNIFICANT CHANGE UP (ref 3.8–5.2)
RBC # FLD: 13.2 % — SIGNIFICANT CHANGE UP (ref 10.3–14.5)
SARS-COV-2 IGG+IGM SERPL QL IA: 0.4 U/ML — SIGNIFICANT CHANGE UP
SARS-COV-2 IGG+IGM SERPL QL IA: NEGATIVE — SIGNIFICANT CHANGE UP
SODIUM SERPL-SCNC: 140 MMOL/L — SIGNIFICANT CHANGE UP (ref 135–145)
SPECIMEN SOURCE: SIGNIFICANT CHANGE UP
T3FREE SERPL-MCNC: 2.26 PG/ML — SIGNIFICANT CHANGE UP (ref 1.8–4.6)
TRIGL SERPL-MCNC: 83 MG/DL — SIGNIFICANT CHANGE UP
TSH SERPL-MCNC: 4.97 UU/ML — HIGH (ref 0.34–4.82)
VIT B12 SERPL-MCNC: 1862 PG/ML — HIGH (ref 232–1245)
VIT B12 SERPL-MCNC: 1982 PG/ML — HIGH (ref 232–1245)
WBC # BLD: 11.55 K/UL — HIGH (ref 3.8–10.5)
WBC # FLD AUTO: 11.55 K/UL — HIGH (ref 3.8–10.5)

## 2021-03-20 RX ORDER — LANOLIN ALCOHOL/MO/W.PET/CERES
5 CREAM (GRAM) TOPICAL ONCE
Refills: 0 | Status: COMPLETED | OUTPATIENT
Start: 2021-03-20 | End: 2021-03-20

## 2021-03-20 RX ADMIN — RISPERIDONE 1 MILLIGRAM(S): 4 TABLET ORAL at 05:59

## 2021-03-20 RX ADMIN — ATENOLOL 50 MILLIGRAM(S): 25 TABLET ORAL at 05:59

## 2021-03-20 RX ADMIN — DONEPEZIL HYDROCHLORIDE 5 MILLIGRAM(S): 10 TABLET, FILM COATED ORAL at 22:17

## 2021-03-20 RX ADMIN — Medication 81 MILLIGRAM(S): at 11:27

## 2021-03-20 RX ADMIN — Medication 5 MILLIGRAM(S): at 22:21

## 2021-03-20 RX ADMIN — VALSARTAN 160 MILLIGRAM(S): 80 TABLET ORAL at 05:58

## 2021-03-20 RX ADMIN — ENOXAPARIN SODIUM 40 MILLIGRAM(S): 100 INJECTION SUBCUTANEOUS at 11:27

## 2021-03-20 RX ADMIN — RISPERIDONE 1 MILLIGRAM(S): 4 TABLET ORAL at 17:20

## 2021-03-20 NOTE — PROGRESS NOTE ADULT - PROBLEM SELECTOR PLAN 1
p/w ?Syncope vs Fall  CT head: no acute changes  CT C spine: no acute fracture  EKG shows Atrial fibrillation  Troponin negative  Tele monitoring  f/u US Carotid doppler  f/u Echo   f/u Orthostatic vitals  F/u MRI brain  Neuro Dr. Murillo  PT evaluation p/w ?Syncope vs Fall  CT head: no acute changes  CT C spine: no acute fracture  EKG shows Atrial fibrillation  Troponin negative  Tele monitoring  f/u US Carotid doppler  f/u Echo   f/u Orthostatic vitals  F/u MRI Cspine  Neuro Dr. Murillo  PT evaluation

## 2021-03-20 NOTE — PHYSICAL THERAPY INITIAL EVALUATION ADULT - IMPAIRMENTS FOUND, PT EVAL
aerobic capacity/endurance/arousal, attention, and cognition/cognitive impairment/gait, locomotion, and balance/gross motor/muscle strength

## 2021-03-20 NOTE — PHYSICAL THERAPY INITIAL EVALUATION ADULT - GENERAL OBSERVATIONS, REHAB EVAL
Pt. received supine in bed, NAD, on telemetry. Pt. is drowsy but was easily aroused; confused and oriented x 1. Pt. unable to follow verbal commands but was able to participate minimally in PT evaluation.

## 2021-03-20 NOTE — PHYSICAL THERAPY INITIAL EVALUATION ADULT - LIVES WITH, PROFILE
in an apartment (as per EMR). Unable to obtain if pt. has stairs to negotiate. Unable to reach emergency contact at this time./alone

## 2021-03-20 NOTE — PHYSICAL THERAPY INITIAL EVALUATION ADULT - ADDITIONAL COMMENTS
Unable to obtain if patient uses an assistive device due to her cognition; as per EMR pt. lives alone.

## 2021-03-20 NOTE — PHYSICAL THERAPY INITIAL EVALUATION ADULT - DIAGNOSIS, PT EVAL
Overall decline in functional mobility due to generalized weakness, decreased cognition, strength, balance and endurance.

## 2021-03-20 NOTE — PROGRESS NOTE ADULT - SUBJECTIVE AND OBJECTIVE BOX
PGY-1 Progress Note discussed with attending    PAGER #: [[phone]] TILL 5:00 PM  PLEASE CONTACT ON CALL TEAM:  - On Call Team (Please refer to Dru) FROM 5:00 PM - 8:30PM  - Nightfloat Team FROM 8:30 -7:30 AM      INTERVAL HPI/OVERNIGHT EVENTS: No acute overnight events, patient has bilateral resting tremors. Patient somnolent this AM, Await MRI brain    MEDICATIONS  (STANDING):  aspirin  chewable 81 milliGRAM(s) Oral daily  ATENolol  Tablet 50 milliGRAM(s) Oral daily  donepezil 5 milliGRAM(s) Oral at bedtime  enoxaparin Injectable 40 milliGRAM(s) SubCutaneous daily  risperiDONE   Tablet 1 milliGRAM(s) Oral two times a day  valsartan 160 milliGRAM(s) Oral daily    MEDICATIONS  (PRN):      REVIEW OF SYSTEMS:  CONSTITUTIONAL: No fever, weight loss, or fatigue  RESPIRATORY: No cough, wheezing, chills or hemoptysis; No shortness of breath  CARDIOVASCULAR: No chest pain, palpitations, dizziness, or leg swelling  GASTROINTESTINAL: No abdominal pain. No nausea, vomiting, or hematemesis; No diarrhea or constipation. No melena or hematochezia.  GENITOURINARY: No dysuria or hematuria, urinary frequency  NEUROLOGICAL: No headaches, memory loss, loss of strength, numbness, or tremors  SKIN: No itching, burning, rashes, or lesions     Vital Signs Last 24 Hrs  T(C): 37 (20 Mar 2021 15:25), Max: 37 (20 Mar 2021 15:25)  T(F): 98.6 (20 Mar 2021 15:25), Max: 98.6 (20 Mar 2021 15:25)  HR: 67 (20 Mar 2021 15:25) (67 - 93)  BP: 98/48 (20 Mar 2021 15:25) (93/50 - 125/68)  BP(mean): --  RR: 17 (20 Mar 2021 15:25) (17 - 18)  SpO2: 96% (20 Mar 2021 15:25) (91% - 100%)    PHYSICAL EXAMINATION:  GENERAL: NAD, well built  HEAD:  Atraumatic, Normocephalic  EYES:  conjunctiva and sclera clear  NECK: Supple, No JVD, Normal thyroid  CHEST/LUNG: Clear to auscultation. Clear to percussion bilaterally; No rales, rhonchi, wheezing, or rubs  HEART: Regular rate and rhythm; No murmurs, rubs, or gallops  ABDOMEN: Soft, Nontender, Nondistended; Bowel sounds present  NERVOUS SYSTEM:  pt somnolent, has bilateral resting tremors  EXTREMITIES:  2+ Peripheral Pulses, No clubbing, cyanosis, or edema  SKIN: warm dry                          12.5   11.55 )-----------( 216      ( 20 Mar 2021 08:22 )             37.2     03-20    140  |  105  |  16  ----------------------------<  100<H>  3.5   |  26  |  1.12    Ca    9.1      20 Mar 2021 08:22  Phos  3.0     03-20  Mg     2.1     03-20    TPro  7.3  /  Alb  3.4<L>  /  TBili  0.6  /  DBili  x   /  AST  25  /  ALT  18  /  AlkPhos  77  03-20    LIVER FUNCTIONS - ( 20 Mar 2021 18:43 )  Alb: x     / Pro: x     / ALK PHOS: x     / ALT: x     / AST: x     / GGT: 7 U/L       CARDIAC MARKERS ( 19 Mar 2021 06:38 )  <0.015 ng/mL / x     / x     / x     / x              CAPILLARY BLOOD GLUCOSE      RADIOLOGY & ADDITIONAL TESTS:                   PGY-1 Progress Note discussed with attending    PAGER #: [[phone]] TILL 5:00 PM  PLEASE CONTACT ON CALL TEAM:  - On Call Team (Please refer to Dru) FROM 5:00 PM - 8:30PM  - Nightfloat Team FROM 8:30 -7:30 AM      INTERVAL HPI/OVERNIGHT EVENTS: No acute overnight events, patient has bilateral resting tremors. Patient somnolent this AM, Await MRI Cspine    MEDICATIONS  (STANDING):  aspirin  chewable 81 milliGRAM(s) Oral daily  ATENolol  Tablet 50 milliGRAM(s) Oral daily  donepezil 5 milliGRAM(s) Oral at bedtime  enoxaparin Injectable 40 milliGRAM(s) SubCutaneous daily  risperiDONE   Tablet 1 milliGRAM(s) Oral two times a day  valsartan 160 milliGRAM(s) Oral daily    MEDICATIONS  (PRN):      REVIEW OF SYSTEMS:  CONSTITUTIONAL: No fever, weight loss, or fatigue  RESPIRATORY: No cough, wheezing, chills or hemoptysis; No shortness of breath  CARDIOVASCULAR: No chest pain, palpitations, dizziness, or leg swelling  GASTROINTESTINAL: No abdominal pain. No nausea, vomiting, or hematemesis; No diarrhea or constipation. No melena or hematochezia.  GENITOURINARY: No dysuria or hematuria, urinary frequency  NEUROLOGICAL: No headaches, memory loss, loss of strength, numbness, or tremors  SKIN: No itching, burning, rashes, or lesions     Vital Signs Last 24 Hrs  T(C): 37 (20 Mar 2021 15:25), Max: 37 (20 Mar 2021 15:25)  T(F): 98.6 (20 Mar 2021 15:25), Max: 98.6 (20 Mar 2021 15:25)  HR: 67 (20 Mar 2021 15:25) (67 - 93)  BP: 98/48 (20 Mar 2021 15:25) (93/50 - 125/68)  BP(mean): --  RR: 17 (20 Mar 2021 15:25) (17 - 18)  SpO2: 96% (20 Mar 2021 15:25) (91% - 100%)    PHYSICAL EXAMINATION:  GENERAL: NAD, well built  HEAD:  Atraumatic, Normocephalic  EYES:  conjunctiva and sclera clear  NECK: Supple, No JVD, Normal thyroid  CHEST/LUNG: Clear to auscultation. Clear to percussion bilaterally; No rales, rhonchi, wheezing, or rubs  HEART: Regular rate and rhythm; No murmurs, rubs, or gallops  ABDOMEN: Soft, Nontender, Nondistended; Bowel sounds present  NERVOUS SYSTEM:  pt somnolent, has bilateral resting tremors  EXTREMITIES:  2+ Peripheral Pulses, No clubbing, cyanosis, or edema  SKIN: warm dry                          12.5   11.55 )-----------( 216      ( 20 Mar 2021 08:22 )             37.2     03-20    140  |  105  |  16  ----------------------------<  100<H>  3.5   |  26  |  1.12    Ca    9.1      20 Mar 2021 08:22  Phos  3.0     03-20  Mg     2.1     03-20    TPro  7.3  /  Alb  3.4<L>  /  TBili  0.6  /  DBili  x   /  AST  25  /  ALT  18  /  AlkPhos  77  03-20    LIVER FUNCTIONS - ( 20 Mar 2021 18:43 )  Alb: x     / Pro: x     / ALK PHOS: x     / ALT: x     / AST: x     / GGT: 7 U/L       CARDIAC MARKERS ( 19 Mar 2021 06:38 )  <0.015 ng/mL / x     / x     / x     / x              CAPILLARY BLOOD GLUCOSE      RADIOLOGY & ADDITIONAL TESTS:

## 2021-03-20 NOTE — PHYSICAL THERAPY INITIAL EVALUATION ADULT - PERTINENT HX OF CURRENT PROBLEM, REHAB EVAL
Pt. admitted for fall / syncope. Head CT revealed no acute intracranial findings. CT cervical spine showed no acute fracture or dislocation.

## 2021-03-21 LAB
ALBUMIN SERPL ELPH-MCNC: 3.2 G/DL — LOW (ref 3.5–5)
ALP SERPL-CCNC: 71 U/L — SIGNIFICANT CHANGE UP (ref 40–120)
ALT FLD-CCNC: 21 U/L DA — SIGNIFICANT CHANGE UP (ref 10–60)
ANION GAP SERPL CALC-SCNC: 9 MMOL/L — SIGNIFICANT CHANGE UP (ref 5–17)
AST SERPL-CCNC: 41 U/L — HIGH (ref 10–40)
BASOPHILS # BLD AUTO: 0.06 K/UL — SIGNIFICANT CHANGE UP (ref 0–0.2)
BASOPHILS NFR BLD AUTO: 0.6 % — SIGNIFICANT CHANGE UP (ref 0–2)
BILIRUB SERPL-MCNC: 0.5 MG/DL — SIGNIFICANT CHANGE UP (ref 0.2–1.2)
BUN SERPL-MCNC: 22 MG/DL — HIGH (ref 7–18)
CALCIUM SERPL-MCNC: 9.6 MG/DL — SIGNIFICANT CHANGE UP (ref 8.4–10.5)
CHLORIDE SERPL-SCNC: 109 MMOL/L — HIGH (ref 96–108)
CO2 SERPL-SCNC: 26 MMOL/L — SIGNIFICANT CHANGE UP (ref 22–31)
CREAT SERPL-MCNC: 1.04 MG/DL — SIGNIFICANT CHANGE UP (ref 0.5–1.3)
EOSINOPHIL # BLD AUTO: 0.11 K/UL — SIGNIFICANT CHANGE UP (ref 0–0.5)
EOSINOPHIL NFR BLD AUTO: 1.2 % — SIGNIFICANT CHANGE UP (ref 0–6)
GLUCOSE SERPL-MCNC: 95 MG/DL — SIGNIFICANT CHANGE UP (ref 70–99)
HCT VFR BLD CALC: 39.9 % — SIGNIFICANT CHANGE UP (ref 34.5–45)
HGB BLD-MCNC: 12.9 G/DL — SIGNIFICANT CHANGE UP (ref 11.5–15.5)
IMM GRANULOCYTES NFR BLD AUTO: 0.4 % — SIGNIFICANT CHANGE UP (ref 0–1.5)
LYMPHOCYTES # BLD AUTO: 1.86 K/UL — SIGNIFICANT CHANGE UP (ref 1–3.3)
LYMPHOCYTES # BLD AUTO: 19.9 % — SIGNIFICANT CHANGE UP (ref 13–44)
MAGNESIUM SERPL-MCNC: 2.2 MG/DL — SIGNIFICANT CHANGE UP (ref 1.6–2.6)
MCHC RBC-ENTMCNC: 29.4 PG — SIGNIFICANT CHANGE UP (ref 27–34)
MCHC RBC-ENTMCNC: 32.3 GM/DL — SIGNIFICANT CHANGE UP (ref 32–36)
MCV RBC AUTO: 90.9 FL — SIGNIFICANT CHANGE UP (ref 80–100)
MONOCYTES # BLD AUTO: 0.82 K/UL — SIGNIFICANT CHANGE UP (ref 0–0.9)
MONOCYTES NFR BLD AUTO: 8.8 % — SIGNIFICANT CHANGE UP (ref 2–14)
NEUTROPHILS # BLD AUTO: 6.45 K/UL — SIGNIFICANT CHANGE UP (ref 1.8–7.4)
NEUTROPHILS NFR BLD AUTO: 69.1 % — SIGNIFICANT CHANGE UP (ref 43–77)
NRBC # BLD: 0 /100 WBCS — SIGNIFICANT CHANGE UP (ref 0–0)
PHOSPHATE SERPL-MCNC: 3.6 MG/DL — SIGNIFICANT CHANGE UP (ref 2.5–4.5)
PLATELET # BLD AUTO: 218 K/UL — SIGNIFICANT CHANGE UP (ref 150–400)
POTASSIUM SERPL-MCNC: 4.1 MMOL/L — SIGNIFICANT CHANGE UP (ref 3.5–5.3)
POTASSIUM SERPL-SCNC: 4.1 MMOL/L — SIGNIFICANT CHANGE UP (ref 3.5–5.3)
PROT SERPL-MCNC: 7.1 G/DL — SIGNIFICANT CHANGE UP (ref 6–8.3)
RBC # BLD: 4.39 M/UL — SIGNIFICANT CHANGE UP (ref 3.8–5.2)
RBC # FLD: 13 % — SIGNIFICANT CHANGE UP (ref 10.3–14.5)
SODIUM SERPL-SCNC: 144 MMOL/L — SIGNIFICANT CHANGE UP (ref 135–145)
T PALLIDUM AB TITR SER: NEGATIVE — SIGNIFICANT CHANGE UP
T4 FREE SERPL-MCNC: 1.4 NG/DL — SIGNIFICANT CHANGE UP (ref 0.9–1.8)
WBC # BLD: 9.34 K/UL — SIGNIFICANT CHANGE UP (ref 3.8–10.5)
WBC # FLD AUTO: 9.34 K/UL — SIGNIFICANT CHANGE UP (ref 3.8–10.5)

## 2021-03-21 RX ORDER — SODIUM CHLORIDE 9 MG/ML
500 INJECTION INTRAMUSCULAR; INTRAVENOUS; SUBCUTANEOUS ONCE
Refills: 0 | Status: COMPLETED | OUTPATIENT
Start: 2021-03-21 | End: 2021-03-21

## 2021-03-21 RX ADMIN — SODIUM CHLORIDE 500 MILLILITER(S): 9 INJECTION INTRAMUSCULAR; INTRAVENOUS; SUBCUTANEOUS at 15:53

## 2021-03-21 RX ADMIN — DONEPEZIL HYDROCHLORIDE 5 MILLIGRAM(S): 10 TABLET, FILM COATED ORAL at 21:25

## 2021-03-21 RX ADMIN — Medication 81 MILLIGRAM(S): at 11:24

## 2021-03-21 RX ADMIN — RISPERIDONE 1 MILLIGRAM(S): 4 TABLET ORAL at 17:02

## 2021-03-21 RX ADMIN — ENOXAPARIN SODIUM 40 MILLIGRAM(S): 100 INJECTION SUBCUTANEOUS at 11:24

## 2021-03-21 NOTE — CHART NOTE - NSCHARTNOTEFT_GEN_A_CORE
Patient BP on low side. Notified of Bp of 89/58 HR 69  Atenolol and Valsartan HELD in AM due to parameter  Discontinued Valsartan. Atenolol remains with parameters  Given 500cc bolus will recheck in 1 hour

## 2021-03-21 NOTE — PROGRESS NOTE ADULT - PROBLEM SELECTOR PLAN 1
p/w ?Syncope vs Fall  CT head: no acute changes  CT C spine: no acute fracture  EKG shows Atrial fibrillation  Troponin negative  Tele monitoring  f/u US Carotid doppler  f/u Echo   f/u Orthostatic vitals  F/u MRI Cspine  Neuro Dr. Murillo  PT evaluation

## 2021-03-21 NOTE — PROGRESS NOTE ADULT - SUBJECTIVE AND OBJECTIVE BOX
INTERVAL HPI/OVERNIGHT EVENTS: No acute overnight events, patient has bilateral resting tremors. Patient somnolent this AM, Await MRI Cspine    MEDICATIONS  (STANDING):  aspirin  chewable 81 milliGRAM(s) Oral daily  ATENolol  Tablet 50 milliGRAM(s) Oral daily  donepezil 5 milliGRAM(s) Oral at bedtime  enoxaparin Injectable 40 milliGRAM(s) SubCutaneous daily  risperiDONE   Tablet 1 milliGRAM(s) Oral two times a day    MEDICATIONS  (PRN):      REVIEW OF SYSTEMS:  CONSTITUTIONAL: No fever, weight loss, or fatigue  RESPIRATORY: No cough, wheezing, chills or hemoptysis; No shortness of breath  CARDIOVASCULAR: No chest pain, palpitations, dizziness, or leg swelling  GASTROINTESTINAL: No abdominal pain. No nausea, vomiting, or hematemesis; No diarrhea or constipation. No melena or hematochezia.  GENITOURINARY: No dysuria or hematuria, urinary frequency  NEUROLOGICAL: No headaches, memory loss, loss of strength, numbness, or tremors  SKIN: No itching, burning, rashes, or lesions     Vital Signs Last 24 Hrs  T(C): 36.9 (21 Mar 2021 20:30), Max: 36.9 (21 Mar 2021 07:31)  T(F): 98.4 (21 Mar 2021 20:30), Max: 98.4 (21 Mar 2021 07:31)  HR: 67 (21 Mar 2021 20:30) (63 - 100)  BP: 135/58 (21 Mar 2021 20:30) (89/58 - 154/77)  BP(mean): --  RR: 18 (21 Mar 2021 20:30) (17 - 18)  SpO2: 94% (21 Mar 2021 20:30) (94% - 96%)    PHYSICAL EXAMINATION:  GENERAL: NAD, well built  HEAD:  Atraumatic, Normocephalic  EYES:  conjunctiva and sclera clear  NECK: Supple, No JVD, Normal thyroid  CHEST/LUNG: Clear to auscultation. Clear to percussion bilaterally; No rales, rhonchi, wheezing, or rubs  HEART: Regular rate and rhythm; No murmurs, rubs, or gallops  ABDOMEN: Soft, Nontender, Nondistended; Bowel sounds present  NERVOUS SYSTEM:  pt somnolent, has bilateral resting tremors  EXTREMITIES:  2+ Peripheral Pulses, No clubbing, cyanosis, or edema  SKIN: warm dry    LABS:      144  |  109<H>  |  22<H>  ----------------------------<  95  4.1   |  26  |  1.04    Ca    9.6      21 Mar 2021 06:44  Phos  3.6     -  Mg     2.2         TPro  7.1  /  Alb  3.2<L>  /  TBili  0.5  /  DBili      /  AST  41<H>  /  ALT    /  AlkPhos  71      Creatinine Trend: 1.04 <--, 1.12 <--, 1.02 <--                        12.9   9.34  )-----------( 218      ( 21 Mar 2021 06:44 )             39.9     Urine Studies:  Urinalysis Basic - ( 19 Mar 2021 10:23 )    Color: Yellow / Appearance: Clear / S.020 / pH:   Gluc:  / Ketone: Trace  / Bili: Negative / Urobili: Negative   Blood:  / Protein: 30 mg/dL / Nitrite: Negative   Leuk Esterase: Negative / RBC: 0-2 /HPF / WBC 3-5 /HPF   Sq Epi:  / Non Sq Epi: Few /HPF / Bacteria: Negative /HPF              LIVER FUNCTIONS - ( 21 Mar 2021 06:44 )  Alb: 3.2 g/dL / Pro: 7.1 g/dL / ALK PHOS: 71 U/L / ALT: 21 U/L DA / AST: 41 U/L / GGT: x               CAPILLARY BLOOD GLUCOSE      RADIOLOGY & ADDITIONAL TESTS:

## 2021-03-22 LAB
% ALBUMIN: 55.4 % — SIGNIFICANT CHANGE UP
% ALPHA 1: 5 % — SIGNIFICANT CHANGE UP
% ALPHA 2: 11.4 % — SIGNIFICANT CHANGE UP
% BETA: 12.2 % — SIGNIFICANT CHANGE UP
% GAMMA: 16 % — SIGNIFICANT CHANGE UP
ALBUMIN SERPL ELPH-MCNC: 2.7 G/DL — LOW (ref 3.5–5)
ALBUMIN SERPL ELPH-MCNC: 3.9 G/DL — SIGNIFICANT CHANGE UP (ref 3.6–5.5)
ALBUMIN/GLOB SERPL ELPH: 1.2 RATIO — SIGNIFICANT CHANGE UP
ALP SERPL-CCNC: 64 U/L — SIGNIFICANT CHANGE UP (ref 40–120)
ALPHA1 GLOB SERPL ELPH-MCNC: 0.4 G/DL — SIGNIFICANT CHANGE UP (ref 0.1–0.4)
ALPHA2 GLOB SERPL ELPH-MCNC: 0.8 G/DL — SIGNIFICANT CHANGE UP (ref 0.5–1)
ALT FLD-CCNC: 19 U/L DA — SIGNIFICANT CHANGE UP (ref 10–60)
ANION GAP SERPL CALC-SCNC: 6 MMOL/L — SIGNIFICANT CHANGE UP (ref 5–17)
AST SERPL-CCNC: 21 U/L — SIGNIFICANT CHANGE UP (ref 10–40)
B-GLOBULIN SERPL ELPH-MCNC: 0.9 G/DL — SIGNIFICANT CHANGE UP (ref 0.5–1)
BASOPHILS # BLD AUTO: 0.05 K/UL — SIGNIFICANT CHANGE UP (ref 0–0.2)
BASOPHILS NFR BLD AUTO: 0.6 % — SIGNIFICANT CHANGE UP (ref 0–2)
BILIRUB SERPL-MCNC: 0.5 MG/DL — SIGNIFICANT CHANGE UP (ref 0.2–1.2)
BUN SERPL-MCNC: 23 MG/DL — HIGH (ref 7–18)
CALCIUM SERPL-MCNC: 9 MG/DL — SIGNIFICANT CHANGE UP (ref 8.4–10.5)
CHLORIDE SERPL-SCNC: 112 MMOL/L — HIGH (ref 96–108)
CO2 SERPL-SCNC: 26 MMOL/L — SIGNIFICANT CHANGE UP (ref 22–31)
CREAT SERPL-MCNC: 0.84 MG/DL — SIGNIFICANT CHANGE UP (ref 0.5–1.3)
EOSINOPHIL # BLD AUTO: 0.22 K/UL — SIGNIFICANT CHANGE UP (ref 0–0.5)
EOSINOPHIL NFR BLD AUTO: 2.7 % — SIGNIFICANT CHANGE UP (ref 0–6)
GAMMA GLOBULIN: 1.1 G/DL — SIGNIFICANT CHANGE UP (ref 0.6–1.6)
GLUCOSE SERPL-MCNC: 93 MG/DL — SIGNIFICANT CHANGE UP (ref 70–99)
HCT VFR BLD CALC: 33.1 % — LOW (ref 34.5–45)
HGB BLD-MCNC: 10.9 G/DL — LOW (ref 11.5–15.5)
IMM GRANULOCYTES NFR BLD AUTO: 0.6 % — SIGNIFICANT CHANGE UP (ref 0–1.5)
LYMPHOCYTES # BLD AUTO: 1.8 K/UL — SIGNIFICANT CHANGE UP (ref 1–3.3)
LYMPHOCYTES # BLD AUTO: 22 % — SIGNIFICANT CHANGE UP (ref 13–44)
MAGNESIUM SERPL-MCNC: 2 MG/DL — SIGNIFICANT CHANGE UP (ref 1.6–2.6)
MCHC RBC-ENTMCNC: 29.5 PG — SIGNIFICANT CHANGE UP (ref 27–34)
MCHC RBC-ENTMCNC: 32.9 GM/DL — SIGNIFICANT CHANGE UP (ref 32–36)
MCV RBC AUTO: 89.5 FL — SIGNIFICANT CHANGE UP (ref 80–100)
MONOCYTES # BLD AUTO: 0.74 K/UL — SIGNIFICANT CHANGE UP (ref 0–0.9)
MONOCYTES NFR BLD AUTO: 9 % — SIGNIFICANT CHANGE UP (ref 2–14)
NEUTROPHILS # BLD AUTO: 5.32 K/UL — SIGNIFICANT CHANGE UP (ref 1.8–7.4)
NEUTROPHILS NFR BLD AUTO: 65.1 % — SIGNIFICANT CHANGE UP (ref 43–77)
NRBC # BLD: 0 /100 WBCS — SIGNIFICANT CHANGE UP (ref 0–0)
PHOSPHATE SERPL-MCNC: 2.6 MG/DL — SIGNIFICANT CHANGE UP (ref 2.5–4.5)
PLATELET # BLD AUTO: 189 K/UL — SIGNIFICANT CHANGE UP (ref 150–400)
POTASSIUM SERPL-MCNC: 3.5 MMOL/L — SIGNIFICANT CHANGE UP (ref 3.5–5.3)
POTASSIUM SERPL-SCNC: 3.5 MMOL/L — SIGNIFICANT CHANGE UP (ref 3.5–5.3)
PROT PATTERN SERPL ELPH-IMP: SIGNIFICANT CHANGE UP
PROT SERPL-MCNC: 6.2 G/DL — SIGNIFICANT CHANGE UP (ref 6–8.3)
PROT SERPL-MCNC: 7.1 G/DL — SIGNIFICANT CHANGE UP (ref 6–8.3)
RBC # BLD: 3.7 M/UL — LOW (ref 3.8–5.2)
RBC # FLD: 12.9 % — SIGNIFICANT CHANGE UP (ref 10.3–14.5)
SODIUM SERPL-SCNC: 144 MMOL/L — SIGNIFICANT CHANGE UP (ref 135–145)
WBC # BLD: 8.18 K/UL — SIGNIFICANT CHANGE UP (ref 3.8–10.5)
WBC # FLD AUTO: 8.18 K/UL — SIGNIFICANT CHANGE UP (ref 3.8–10.5)

## 2021-03-22 PROCEDURE — 72141 MRI NECK SPINE W/O DYE: CPT | Mod: 26

## 2021-03-22 RX ADMIN — Medication 81 MILLIGRAM(S): at 11:45

## 2021-03-22 RX ADMIN — RISPERIDONE 1 MILLIGRAM(S): 4 TABLET ORAL at 18:18

## 2021-03-22 RX ADMIN — ATENOLOL 50 MILLIGRAM(S): 25 TABLET ORAL at 05:29

## 2021-03-22 RX ADMIN — ENOXAPARIN SODIUM 40 MILLIGRAM(S): 100 INJECTION SUBCUTANEOUS at 11:45

## 2021-03-22 RX ADMIN — DONEPEZIL HYDROCHLORIDE 5 MILLIGRAM(S): 10 TABLET, FILM COATED ORAL at 22:01

## 2021-03-22 RX ADMIN — Medication 1 MILLIGRAM(S): at 10:16

## 2021-03-22 RX ADMIN — RISPERIDONE 1 MILLIGRAM(S): 4 TABLET ORAL at 05:29

## 2021-03-22 RX ADMIN — SODIUM CHLORIDE 1000 MILLILITER(S): 9 INJECTION INTRAMUSCULAR; INTRAVENOUS; SUBCUTANEOUS at 00:51

## 2021-03-22 NOTE — PROGRESS NOTE ADULT - PROBLEM SELECTOR PLAN 4
RISK                                                          Points  [  ] Previous VTE                                                3  [  ] Thrombophilia                                             2  [  ] Lower limb paralysis                                   2        (unable to hold up >15 seconds)    [  ] Current Cancer                                             2         (within 6 months)  [ x ] Immobilization > 24 hrs                              1  [  ] ICU/CCU stay > 24 hours                             1  [ x ] Age > 60                                                         1    IMPROVE VTE Score: 2  lovenox for VTE prophylaxis.

## 2021-03-22 NOTE — PROGRESS NOTE ADULT - ASSESSMENT
89 yr F from home with PMHx of HTN, ?Afib, dementia presents to ED after fall vs Syncope.     In ED, /85, HR 92      Pt admitted for Syncope
89 yr F from home with PMHx of HTN, ?Afib, dementia presents to ED after fall vs Syncope.     In ED, /85, HR 92      Pt admitted for Syncope
89 yr F from home with PMHx of HTN, ?Afib, dementia presents to ED after fall vs Syncope. Admitted for syncope.

## 2021-03-22 NOTE — PROGRESS NOTE ADULT - SUBJECTIVE AND OBJECTIVE BOX
PGY-1 Progress Note discussed with attending    PAGER #: [[phone]] TILL 5:00 PM  PLEASE CONTACT ON CALL TEAM:  - On Call Team (Please refer to Dru) FROM 5:00 PM - 8:30PM  - Nightfloat Team FROM 8:30 -7:30 AM    OVERNIGHT EVENTS:   - no acute events overnight. patient resting comfortably in bed.    REVIEW OF SYSTEMS:  CONSTITUTIONAL: No fever, weight loss, or fatigue  RESPIRATORY: No cough, wheezing, chills or hemoptysis; No shortness of breath  CARDIOVASCULAR: No chest pain, palpitations, dizziness, or leg swelling  GASTROINTESTINAL: No abdominal pain. No nausea, vomiting, or hematemesis; No diarrhea or constipation. No melena or hematochezia.  GENITOURINARY: No dysuria or hematuria, urinary frequency  NEUROLOGICAL: No headaches, memory loss, loss of strength, numbness, or tremors  SKIN: No itching, burning, rashes, or lesions     MEDICATIONS  (STANDING):  aspirin  chewable 81 milliGRAM(s) Oral daily  ATENolol  Tablet 50 milliGRAM(s) Oral daily  donepezil 5 milliGRAM(s) Oral at bedtime  enoxaparin Injectable 40 milliGRAM(s) SubCutaneous daily  risperiDONE   Tablet 1 milliGRAM(s) Oral two times a day    MEDICATIONS  (PRN):      Vital Signs Last 24 Hrs  T(C): 36.3 (22 Mar 2021 15:18), Max: 37 (22 Mar 2021 11:53)  T(F): 97.4 (22 Mar 2021 15:18), Max: 98.6 (22 Mar 2021 11:53)  HR: 68 (22 Mar 2021 15:18) (61 - 89)  BP: 156/61 (22 Mar 2021 15:18) (92/47 - 156/61)  BP(mean): --  RR: 17 (22 Mar 2021 15:18) (17 - 18)  SpO2: 99% (22 Mar 2021 15:18) (94% - 99%)    PHYSICAL EXAMINATION:  GENERAL: NAD, AAOx1  HEAD: AT/NC  EYES: conjunctiva and sclera clear  NECK: supple, No JVD noted, Normal thyroid  CHEST/LUNG: CTABL; no rales, rhonchi, wheezing, or rubs  HEART: regular rate and rhythm; no murmurs, rubs, or gallops  ABDOMEN: soft, nontender, nondistended; Bowel sounds present  EXTREMITIES:  2+ Peripheral Pulses, No clubbing, cyanosis, or edema  SKIN: warm dry                          10.9   8.18  )-----------( 189      ( 22 Mar 2021 06:17 )             33.1     03-22    144  |  112<H>  |  23<H>  ----------------------------<  93  3.5   |  26  |  0.84    Ca    9.0      22 Mar 2021 06:17  Phos  2.6     03-22  Mg     2.0     03-22    TPro  6.2  /  Alb  2.7<L>  /  TBili  0.5  /  DBili  x   /  AST  21  /  ALT  19  /  AlkPhos  64  03-22    LIVER FUNCTIONS - ( 22 Mar 2021 06:17 )  Alb: 2.7 g/dL / Pro: 6.2 g/dL / ALK PHOS: 64 U/L / ALT: 19 U/L DA / AST: 21 U/L / GGT: x                 COVID-19 PCR: NotDetec (19 Mar 2021 10:23)      CAPILLARY BLOOD GLUCOSE          RADIOLOGY & ADDITIONAL TESTS:

## 2021-03-22 NOTE — PROGRESS NOTE ADULT - PROBLEM SELECTOR PLAN 3
c/w Atenolol and valsartan  Monitor BP

## 2021-03-22 NOTE — PROGRESS NOTE ADULT - PROBLEM SELECTOR PLAN 2
Unknown if pt has a H/O Afib, pt said she halways had irregular HR   EKG shows Atrial fibrillation  CHADVASc score 4 (holding AC due to fall risk, risks outweigh benefit)  Troponin negative  Tele monitoring  c/w Atenolol  Not on any AC  Cardio Dr. Garcia
- Unknown if pt has a H/O Afib, pt said she always had irregular HR   - EKG shows Atrial fibrillation  - CHADVASc score 4 (holding AC due to fall risk, risks outweigh benefit)  - Troponin negative  - Tele monitoring  - c/w Atenolol  - hold AC for risk of fall    - Cardio, Dr. Garcia, onboard
Unknown if pt has a H/O Afib, pt said she halways had irregular HR   EKG shows Atrial fibrillation  CHADVASc score 4 (holding AC due to fall risk, risks outweigh benefit)  Troponin negative  Tele monitoring  c/w Atenolol  Not on any AC  Cardio Dr. Garcia

## 2021-03-22 NOTE — PROGRESS NOTE ADULT - PROBLEM SELECTOR PLAN 1
- p/w Syncope vs Fall  - CT head: no acute changes  - CT C spine: no acute fracture  - EKG shows Atrial fibrillation  - Echo EF >70%, G1DD  - US Carotid doppler unremarkable  - Troponin negative  - Telemonitoring   - f/u MRI Cspine    - neuro, Dr. Murillo, onboard  - PT onboard

## 2021-03-23 ENCOUNTER — TRANSCRIPTION ENCOUNTER (OUTPATIENT)
Age: 86
End: 2021-03-23

## 2021-03-23 VITALS
TEMPERATURE: 98 F | OXYGEN SATURATION: 94 % | RESPIRATION RATE: 18 BRPM | HEART RATE: 75 BPM | DIASTOLIC BLOOD PRESSURE: 62 MMHG | SYSTOLIC BLOOD PRESSURE: 159 MMHG

## 2021-03-23 LAB
ALBUMIN SERPL ELPH-MCNC: 2.7 G/DL — LOW (ref 3.5–5)
ALP SERPL-CCNC: 62 U/L — SIGNIFICANT CHANGE UP (ref 40–120)
ALT FLD-CCNC: 18 U/L DA — SIGNIFICANT CHANGE UP (ref 10–60)
ANA PAT FLD IF-IMP: ABNORMAL
ANA TITR SER: ABNORMAL
ANION GAP SERPL CALC-SCNC: 6 MMOL/L — SIGNIFICANT CHANGE UP (ref 5–17)
AST SERPL-CCNC: 16 U/L — SIGNIFICANT CHANGE UP (ref 10–40)
BASOPHILS # BLD AUTO: 0.08 K/UL — SIGNIFICANT CHANGE UP (ref 0–0.2)
BASOPHILS NFR BLD AUTO: 1 % — SIGNIFICANT CHANGE UP (ref 0–2)
BILIRUB SERPL-MCNC: 0.4 MG/DL — SIGNIFICANT CHANGE UP (ref 0.2–1.2)
BUN SERPL-MCNC: 24 MG/DL — HIGH (ref 7–18)
CALCIUM SERPL-MCNC: 9.3 MG/DL — SIGNIFICANT CHANGE UP (ref 8.4–10.5)
CHLORIDE SERPL-SCNC: 112 MMOL/L — HIGH (ref 96–108)
CO2 SERPL-SCNC: 27 MMOL/L — SIGNIFICANT CHANGE UP (ref 22–31)
CREAT SERPL-MCNC: 0.91 MG/DL — SIGNIFICANT CHANGE UP (ref 0.5–1.3)
EOSINOPHIL # BLD AUTO: 0.25 K/UL — SIGNIFICANT CHANGE UP (ref 0–0.5)
EOSINOPHIL NFR BLD AUTO: 3.1 % — SIGNIFICANT CHANGE UP (ref 0–6)
GLUCOSE SERPL-MCNC: 98 MG/DL — SIGNIFICANT CHANGE UP (ref 70–99)
HCT VFR BLD CALC: 33.7 % — LOW (ref 34.5–45)
HGB BLD-MCNC: 11 G/DL — LOW (ref 11.5–15.5)
IMM GRANULOCYTES NFR BLD AUTO: 0.4 % — SIGNIFICANT CHANGE UP (ref 0–1.5)
LYMPHOCYTES # BLD AUTO: 1.67 K/UL — SIGNIFICANT CHANGE UP (ref 1–3.3)
LYMPHOCYTES # BLD AUTO: 21 % — SIGNIFICANT CHANGE UP (ref 13–44)
MAGNESIUM SERPL-MCNC: 2.2 MG/DL — SIGNIFICANT CHANGE UP (ref 1.6–2.6)
MCHC RBC-ENTMCNC: 30 PG — SIGNIFICANT CHANGE UP (ref 27–34)
MCHC RBC-ENTMCNC: 32.6 GM/DL — SIGNIFICANT CHANGE UP (ref 32–36)
MCV RBC AUTO: 91.8 FL — SIGNIFICANT CHANGE UP (ref 80–100)
MONOCYTES # BLD AUTO: 0.75 K/UL — SIGNIFICANT CHANGE UP (ref 0–0.9)
MONOCYTES NFR BLD AUTO: 9.4 % — SIGNIFICANT CHANGE UP (ref 2–14)
NEUTROPHILS # BLD AUTO: 5.19 K/UL — SIGNIFICANT CHANGE UP (ref 1.8–7.4)
NEUTROPHILS NFR BLD AUTO: 65.1 % — SIGNIFICANT CHANGE UP (ref 43–77)
NRBC # BLD: 0 /100 WBCS — SIGNIFICANT CHANGE UP (ref 0–0)
PHOSPHATE SERPL-MCNC: 2.9 MG/DL — SIGNIFICANT CHANGE UP (ref 2.5–4.5)
PLATELET # BLD AUTO: 185 K/UL — SIGNIFICANT CHANGE UP (ref 150–400)
POTASSIUM SERPL-MCNC: 3.4 MMOL/L — LOW (ref 3.5–5.3)
POTASSIUM SERPL-SCNC: 3.4 MMOL/L — LOW (ref 3.5–5.3)
PROT SERPL-MCNC: 6.3 G/DL — SIGNIFICANT CHANGE UP (ref 6–8.3)
RBC # BLD: 3.67 M/UL — LOW (ref 3.8–5.2)
RBC # FLD: 13.1 % — SIGNIFICANT CHANGE UP (ref 10.3–14.5)
SODIUM SERPL-SCNC: 145 MMOL/L — SIGNIFICANT CHANGE UP (ref 135–145)
WBC # BLD: 7.97 K/UL — SIGNIFICANT CHANGE UP (ref 3.8–10.5)
WBC # FLD AUTO: 7.97 K/UL — SIGNIFICANT CHANGE UP (ref 3.8–10.5)

## 2021-03-23 PROCEDURE — 72141 MRI NECK SPINE W/O DYE: CPT

## 2021-03-23 PROCEDURE — 87635 SARS-COV-2 COVID-19 AMP PRB: CPT

## 2021-03-23 PROCEDURE — 83090 ASSAY OF HOMOCYSTEINE: CPT

## 2021-03-23 PROCEDURE — 82728 ASSAY OF FERRITIN: CPT

## 2021-03-23 PROCEDURE — 82962 GLUCOSE BLOOD TEST: CPT

## 2021-03-23 PROCEDURE — 82140 ASSAY OF AMMONIA: CPT

## 2021-03-23 PROCEDURE — 86769 SARS-COV-2 COVID-19 ANTIBODY: CPT

## 2021-03-23 PROCEDURE — 84439 ASSAY OF FREE THYROXINE: CPT

## 2021-03-23 PROCEDURE — 70450 CT HEAD/BRAIN W/O DYE: CPT

## 2021-03-23 PROCEDURE — 82607 VITAMIN B-12: CPT

## 2021-03-23 PROCEDURE — 84481 FREE ASSAY (FT-3): CPT

## 2021-03-23 PROCEDURE — 80061 LIPID PANEL: CPT

## 2021-03-23 PROCEDURE — 84155 ASSAY OF PROTEIN SERUM: CPT

## 2021-03-23 PROCEDURE — 84484 ASSAY OF TROPONIN QUANT: CPT

## 2021-03-23 PROCEDURE — 99285 EMERGENCY DEPT VISIT HI MDM: CPT

## 2021-03-23 PROCEDURE — 93880 EXTRACRANIAL BILAT STUDY: CPT

## 2021-03-23 PROCEDURE — 84165 PROTEIN E-PHORESIS SERUM: CPT

## 2021-03-23 PROCEDURE — 86038 ANTINUCLEAR ANTIBODIES: CPT

## 2021-03-23 PROCEDURE — 72125 CT NECK SPINE W/O DYE: CPT

## 2021-03-23 PROCEDURE — 80053 COMPREHEN METABOLIC PANEL: CPT

## 2021-03-23 PROCEDURE — 36415 COLL VENOUS BLD VENIPUNCTURE: CPT

## 2021-03-23 PROCEDURE — 84425 ASSAY OF VITAMIN B-1: CPT

## 2021-03-23 PROCEDURE — 86140 C-REACTIVE PROTEIN: CPT

## 2021-03-23 PROCEDURE — 85652 RBC SED RATE AUTOMATED: CPT

## 2021-03-23 PROCEDURE — 83921 ORGANIC ACID SINGLE QUANT: CPT

## 2021-03-23 PROCEDURE — 97162 PT EVAL MOD COMPLEX 30 MIN: CPT

## 2021-03-23 PROCEDURE — 87086 URINE CULTURE/COLONY COUNT: CPT

## 2021-03-23 PROCEDURE — 84100 ASSAY OF PHOSPHORUS: CPT

## 2021-03-23 PROCEDURE — 93005 ELECTROCARDIOGRAM TRACING: CPT

## 2021-03-23 PROCEDURE — 86780 TREPONEMA PALLIDUM: CPT

## 2021-03-23 PROCEDURE — 81001 URINALYSIS AUTO W/SCOPE: CPT

## 2021-03-23 PROCEDURE — 85025 COMPLETE CBC W/AUTO DIFF WBC: CPT

## 2021-03-23 PROCEDURE — 84443 ASSAY THYROID STIM HORMONE: CPT

## 2021-03-23 PROCEDURE — 93306 TTE W/DOPPLER COMPLETE: CPT

## 2021-03-23 PROCEDURE — 82977 ASSAY OF GGT: CPT

## 2021-03-23 PROCEDURE — 83735 ASSAY OF MAGNESIUM: CPT

## 2021-03-23 PROCEDURE — 82746 ASSAY OF FOLIC ACID SERUM: CPT

## 2021-03-23 RX ORDER — ASPIRIN/CALCIUM CARB/MAGNESIUM 324 MG
1 TABLET ORAL
Qty: 0 | Refills: 0 | DISCHARGE
Start: 2021-03-23

## 2021-03-23 RX ADMIN — ENOXAPARIN SODIUM 40 MILLIGRAM(S): 100 INJECTION SUBCUTANEOUS at 11:30

## 2021-03-23 RX ADMIN — ATENOLOL 50 MILLIGRAM(S): 25 TABLET ORAL at 05:20

## 2021-03-23 RX ADMIN — Medication 81 MILLIGRAM(S): at 11:30

## 2021-03-23 RX ADMIN — RISPERIDONE 1 MILLIGRAM(S): 4 TABLET ORAL at 05:20

## 2021-03-23 NOTE — DISCHARGE NOTE PROVIDER - NSDCMRMEDTOKEN_GEN_ALL_CORE_FT
aspirin 81 mg oral tablet, chewable: 1 tab(s) orally once a day  atenolol 50 mg oral tablet: 1 tab(s) orally once a day  donepezil 5 mg oral tablet: 1 tab(s) orally once a day (at bedtime)  risperiDONE 1 mg oral tablet: 1 tab(s) orally 2 times a day  valsartan 160 mg oral tablet: 1 tab(s) orally once a day

## 2021-03-23 NOTE — DISCHARGE NOTE PROVIDER - HOSPITAL COURSE
Patient is a 89 yr F from home with PMHx of HTN, Afib, and dementia, presents to ED after Syncope. Patient was admitted to rule out syncope. CTH showed no acute pathologies. CT c-spine ruled out any signs of fracture. Carotid doppler was unremarkable. Echocardiogram showed >70% EF and G1DD. MRI C-spine was unremarkable as well.     Patient noted to have atrial fibillation on EKG, likely causes of syncope. Patient is treated with atenolol, but anticoagulation was held due to risk of fall.    Patient has a history of HTN, on atenolol and valsartan. Patient will be discharged on home medications.

## 2021-03-23 NOTE — DISCHARGE NOTE NURSING/CASE MANAGEMENT/SOCIAL WORK - PATIENT PORTAL LINK FT
You can access the FollowMyHealth Patient Portal offered by [facility] by registering at the following website: [email or web address]. By joining [facility]’s FollowMyHealth portal, you will also be able to view your health information using other applications (apps) compatible with our system.

## 2021-03-23 NOTE — DISCHARGE NOTE PROVIDER - NSDCCPCAREPLAN_GEN_ALL_CORE_FT
PRINCIPAL DISCHARGE DIAGNOSIS  Diagnosis: Syncope, unspecified syncope type  Assessment and Plan of Treatment: You presented after an episode of syncope and admitted to rule out possible stroke etiologies  - CT head showed no signs of acute bleeding in brain  - CT C-spine, as well as, MRI C-spine were unremarkable  - Carotid doppler was unremarkable  - Echocardiogram showed ejection fraction of >70% and Grade 1 diastolic dysfunction   - please continue to take Aspirin at home      SECONDARY DISCHARGE DIAGNOSES  Diagnosis: HTN (hypertension)  Assessment and Plan of Treatment: You have a history of HTN, on Atenolol and Valsartan at home  - please continue to take your home medications  - please follow up with your primary care physician    Diagnosis: Afib  Assessment and Plan of Treatment: You were noted to have Atrial fibillation on EKG  - you were treated with Atenolol, but we did not started you on anticoagulation, which is a part of treatment to prevent a clot development, because you are a high risk for fall, given your history  - please follow up with your primary care physician

## 2021-03-25 LAB
HOMOCYSTEINE LEVEL: 11 UMOL/L — HIGH
METHYLMALONIC ACID LEVEL: 201 NMOL/L — SIGNIFICANT CHANGE UP (ref 87–318)

## 2021-03-26 LAB — VIT B1 SERPL-MCNC: 120.1 NMOL/L — SIGNIFICANT CHANGE UP (ref 66.5–200)

## 2022-03-04 ENCOUNTER — INPATIENT (INPATIENT)
Facility: HOSPITAL | Age: 87
LOS: 10 days | Discharge: NURSING, REHAB OR LONG-TERM CARE | DRG: 64 | End: 2022-03-15
Attending: INTERNAL MEDICINE | Admitting: INTERNAL MEDICINE
Payer: COMMERCIAL

## 2022-03-04 VITALS
OXYGEN SATURATION: 97 % | WEIGHT: 110.01 LBS | HEART RATE: 106 BPM | HEIGHT: 63 IN | SYSTOLIC BLOOD PRESSURE: 191 MMHG | DIASTOLIC BLOOD PRESSURE: 76 MMHG | RESPIRATION RATE: 19 BRPM

## 2022-03-04 DIAGNOSIS — M79.606 PAIN IN LEG, UNSPECIFIED: ICD-10-CM

## 2022-03-04 LAB
ALBUMIN SERPL ELPH-MCNC: 2.4 G/DL — LOW (ref 3.5–5)
ALP SERPL-CCNC: 69 U/L — SIGNIFICANT CHANGE UP (ref 40–120)
ALT FLD-CCNC: 18 U/L DA — SIGNIFICANT CHANGE UP (ref 10–60)
ANION GAP SERPL CALC-SCNC: 6 MMOL/L — SIGNIFICANT CHANGE UP (ref 5–17)
AST SERPL-CCNC: 46 U/L — HIGH (ref 10–40)
BILIRUB SERPL-MCNC: 0.4 MG/DL — SIGNIFICANT CHANGE UP (ref 0.2–1.2)
BUN SERPL-MCNC: 28 MG/DL — HIGH (ref 7–18)
CALCIUM SERPL-MCNC: 8.2 MG/DL — LOW (ref 8.4–10.5)
CHLORIDE SERPL-SCNC: 112 MMOL/L — HIGH (ref 96–108)
CO2 SERPL-SCNC: 23 MMOL/L — SIGNIFICANT CHANGE UP (ref 22–31)
CREAT SERPL-MCNC: 0.59 MG/DL — SIGNIFICANT CHANGE UP (ref 0.5–1.3)
EGFR: 86 ML/MIN/1.73M2 — SIGNIFICANT CHANGE UP
GLUCOSE SERPL-MCNC: 99 MG/DL — SIGNIFICANT CHANGE UP (ref 70–99)
HCT VFR BLD CALC: 33.7 % — LOW (ref 34.5–45)
HGB BLD-MCNC: 11.1 G/DL — LOW (ref 11.5–15.5)
MCHC RBC-ENTMCNC: 29.8 PG — SIGNIFICANT CHANGE UP (ref 27–34)
MCHC RBC-ENTMCNC: 32.9 GM/DL — SIGNIFICANT CHANGE UP (ref 32–36)
MCV RBC AUTO: 90.3 FL — SIGNIFICANT CHANGE UP (ref 80–100)
NRBC # BLD: 0 /100 WBCS — SIGNIFICANT CHANGE UP (ref 0–0)
PLATELET # BLD AUTO: 281 K/UL — SIGNIFICANT CHANGE UP (ref 150–400)
POTASSIUM SERPL-MCNC: 4.3 MMOL/L — SIGNIFICANT CHANGE UP (ref 3.5–5.3)
POTASSIUM SERPL-SCNC: 4.3 MMOL/L — SIGNIFICANT CHANGE UP (ref 3.5–5.3)
PROT SERPL-MCNC: 6.3 G/DL — SIGNIFICANT CHANGE UP (ref 6–8.3)
RBC # BLD: 3.73 M/UL — LOW (ref 3.8–5.2)
RBC # FLD: 13.9 % — SIGNIFICANT CHANGE UP (ref 10.3–14.5)
SARS-COV-2 RNA SPEC QL NAA+PROBE: SIGNIFICANT CHANGE UP
SODIUM SERPL-SCNC: 141 MMOL/L — SIGNIFICANT CHANGE UP (ref 135–145)
WBC # BLD: 14.94 K/UL — HIGH (ref 3.8–10.5)
WBC # FLD AUTO: 14.94 K/UL — HIGH (ref 3.8–10.5)

## 2022-03-04 PROCEDURE — 99223 1ST HOSP IP/OBS HIGH 75: CPT | Mod: GC

## 2022-03-04 PROCEDURE — 73562 X-RAY EXAM OF KNEE 3: CPT | Mod: 26,LT

## 2022-03-04 PROCEDURE — 70496 CT ANGIOGRAPHY HEAD: CPT | Mod: 26

## 2022-03-04 PROCEDURE — 0042T: CPT

## 2022-03-04 PROCEDURE — 70450 CT HEAD/BRAIN W/O DYE: CPT | Mod: 26,59,MA

## 2022-03-04 PROCEDURE — 72170 X-RAY EXAM OF PELVIS: CPT | Mod: 26

## 2022-03-04 PROCEDURE — 70498 CT ANGIOGRAPHY NECK: CPT | Mod: 26

## 2022-03-04 PROCEDURE — 71045 X-RAY EXAM CHEST 1 VIEW: CPT | Mod: 26

## 2022-03-04 PROCEDURE — 99285 EMERGENCY DEPT VISIT HI MDM: CPT

## 2022-03-04 RX ORDER — HEPARIN SODIUM 5000 [USP'U]/ML
2000 INJECTION INTRAVENOUS; SUBCUTANEOUS EVERY 6 HOURS
Refills: 0 | Status: DISCONTINUED | OUTPATIENT
Start: 2022-03-04 | End: 2022-03-05

## 2022-03-04 RX ORDER — VALSARTAN 80 MG/1
1 TABLET ORAL
Qty: 0 | Refills: 0 | DISCHARGE

## 2022-03-04 RX ORDER — AMLODIPINE BESYLATE 2.5 MG/1
5 TABLET ORAL ONCE
Refills: 0 | Status: COMPLETED | OUTPATIENT
Start: 2022-03-04 | End: 2022-03-04

## 2022-03-04 RX ORDER — DONEPEZIL HYDROCHLORIDE 10 MG/1
1 TABLET, FILM COATED ORAL
Qty: 0 | Refills: 0 | DISCHARGE

## 2022-03-04 RX ORDER — RISPERIDONE 4 MG/1
1 TABLET ORAL
Qty: 0 | Refills: 0 | DISCHARGE

## 2022-03-04 RX ORDER — HEPARIN SODIUM 5000 [USP'U]/ML
4000 INJECTION INTRAVENOUS; SUBCUTANEOUS EVERY 6 HOURS
Refills: 0 | Status: DISCONTINUED | OUTPATIENT
Start: 2022-03-04 | End: 2022-03-05

## 2022-03-04 RX ORDER — SODIUM CHLORIDE 9 MG/ML
1000 INJECTION INTRAMUSCULAR; INTRAVENOUS; SUBCUTANEOUS ONCE
Refills: 0 | Status: COMPLETED | OUTPATIENT
Start: 2022-03-04 | End: 2022-03-04

## 2022-03-04 RX ORDER — HEPARIN SODIUM 5000 [USP'U]/ML
INJECTION INTRAVENOUS; SUBCUTANEOUS
Qty: 25000 | Refills: 0 | Status: DISCONTINUED | OUTPATIENT
Start: 2022-03-04 | End: 2022-03-05

## 2022-03-04 RX ORDER — ASPIRIN/CALCIUM CARB/MAGNESIUM 324 MG
300 TABLET ORAL ONCE
Refills: 0 | Status: DISCONTINUED | OUTPATIENT
Start: 2022-03-04 | End: 2022-03-10

## 2022-03-04 RX ORDER — MORPHINE SULFATE 50 MG/1
2 CAPSULE, EXTENDED RELEASE ORAL ONCE
Refills: 0 | Status: DISCONTINUED | OUTPATIENT
Start: 2022-03-04 | End: 2022-03-04

## 2022-03-04 RX ADMIN — MORPHINE SULFATE 2 MILLIGRAM(S): 50 CAPSULE, EXTENDED RELEASE ORAL at 19:44

## 2022-03-04 RX ADMIN — SODIUM CHLORIDE 1000 MILLILITER(S): 9 INJECTION INTRAMUSCULAR; INTRAVENOUS; SUBCUTANEOUS at 19:40

## 2022-03-04 NOTE — ED PROVIDER NOTE - PHYSICAL EXAMINATION
GENERAL: well appearing, no acute distress   HEAD: atraumatic   EYES: EOMI, pink conjunctiva   ENT: dry oral mucosa   CARDIAC: tachy, no edema, distal pulses present   RESPIRATORY: lungs CTAB, no increased work of breathing   GASTROINTESTINAL: no abdominal tenderness, no rebound or guarding, bowel sounds presents  GENITOURINARY: no CVA tenderness   MUSCULOSKELETAL: mild L knee swelling, but full ROM and no ttp; comparments of leg soft; neuro vascularly intact; pelvis stable   NEUROLOGICAL: alert, spontaneous movement of extremities, following some simple commands   SKIN: intact   PSYCHIATRIC: cooperative  HEME LYMPH: no lymphadenopathy GENERAL: well appearing, no acute distress   HEAD: atraumatic   EYES: EOMI, pink conjunctiva   ENT: dry oral mucosa   CARDIAC: tachy, no edema, distal pulses present   RESPIRATORY: lungs CTAB, no increased work of breathing   GASTROINTESTINAL: no abdominal tenderness, no rebound or guarding, bowel sounds presents  GENITOURINARY: no CVA tenderness   MUSCULOSKELETAL: mild L knee swelling, but full ROM and no ttp; comparments of leg soft; neuro vascularly intact; pelvis stable   NEUROLOGICAL: alert, following some simple commands   SKIN: intact   PSYCHIATRIC: cooperative  HEME LYMPH: no lymphadenopathy GENERAL: well appearing, no acute distress   HEAD: atraumatic   EYES: EOMI, pink conjunctiva   ENT: dry oral mucosa   CARDIAC: tachy, no edema, distal pulses present   RESPIRATORY: lungs CTAB, no increased work of breathing   GASTROINTESTINAL: no abdominal tenderness, no rebound or guarding, bowel sounds presents  GENITOURINARY: no CVA tenderness   MUSCULOSKELETAL: mild L knee swelling, but full ROM and no ttp; comparments of leg soft; pelvis stable   NEUROLOGICAL: alert, following some simple commands   SKIN: intact   PSYCHIATRIC: cooperative  HEME LYMPH: no lymphadenopathy

## 2022-03-04 NOTE — ED PROVIDER NOTE - OBJECTIVE STATEMENT
89 yo F pmh of dementia, afib, HTN, (not on any medications) presents from home w/ vague complaint of LLE pain. Per HHA, pt might have fallen 2 weeks ago. Per son (who lives in [place]), he was told that pt fell 6-7 days ago and since then has been c/o leg pain and is less verbal that usual. Also normally can walk around apt, but now bedbound. Pt unable to provide history.

## 2022-03-04 NOTE — H&P ADULT - ASSESSMENT
Patient is a 89yoF, AAOx2-3 & ambulates w/ assistance at baseline, w/ PMH of Afib (not on AC 2/2 high risk of fall) & HTN, p/w LLE pain. Admitted for CVA

## 2022-03-04 NOTE — H&P ADULT - ATTENDING COMMENTS
Vital Signs Last 24 Hrs  T(C): 36.5 (05 Mar 2022 00:16), Max: 37.2 (04 Mar 2022 20:16)  T(F): 97.7 (05 Mar 2022 00:16), Max: 98.9 (04 Mar 2022 20:16)  HR: 106 (05 Mar 2022 00:16) (106 - 106)  BP: 162/82 (05 Mar 2022 00:16) (162/82 - 191/76)  BP(mean): --  RR: 18 (05 Mar 2022 00:16) (18 - 19)  SpO2: 97% (05 Mar 2022 00:16) (97% - 97%) Patient is a 89 yo F, AAOx2-3 at [facility] & ambulates w/ assistance at baseline, with PMH of Afib (not on AC 2/2 high risk of fall) & HTN, BIEMS as decreased ambulation, altered mentation x 1week as noted by HHA. Patient hada fall 2 weeks ago. Patient's is a poor historian, non verbal agiated in ED. due to current condition; thus, information obtained from the son who lives out of [facility], who noticed that patient not responding well over the phone and thus got concerned.     In ED, sinus tachycardia, afebrile.  CT head negative for acute abnormalities. X ray pelvis negative for acute fracture.  limited exam 2/2 mentation.  Later patient noted to be agitated on exam, moving only right upper and lower extremities, non verbal.  patient noted to have complete left-sided weakness.   CT A head and neck done that showed   left ICA severe stenosis, also incidental finding of pulmonary embolism in L.    I contacted transfer center for neuro, discussed findings, no tpa was recommended, 2/2 unclaer time of onet of symptoms. also for significant left ICA stenosis, unlikely etiology as left sided weakness not explaned by left sided ICA stenosis. possible thromboembolic CVA as patient with h/o Afib.  ordered  mg suppository.  also started on heparin gtt.    Vital Signs Last 24 Hrs  T(C): 36.5 (05 Mar 2022 00:16), Max: 37.2 (04 Mar 2022 20:16)  T(F): 97.7 (05 Mar 2022 00:16), Max: 98.9 (04 Mar 2022 20:16)  HR: 106 (05 Mar 2022 00:16) (106 - 106)  BP: 162/82 (05 Mar 2022 00:16) (162/82 - 191/76)  BP(mean): --  RR: 18 (05 Mar 2022 00:16) (18 - 19)  SpO2: 97% (05 Mar 2022 00:16) (97% - 97%)    Physical exam as above    Labs and imaging studies noted.    Assessment and plan:   Patient is a 89 yo F, AAOx2-3 at [facility] & ambulates w/ assistance at baseline, with PMH of Afib (not on AC 2/2 high risk of fall) & HTN, BIEMS as decreased ambulation, altered mentation x 1week found to have left sided UE/LE weakness admitted for CVA and acute PE.    AMS  CVA/ left sided weakness/ non hemorrhagic  Acute PE  Afib not on AC  HTN    - aphasia, decreased ambulation x 1 week  - likely 2/2 CVA, no tpa given 2/2 as unclear timing of onset of symptoms  - CT A head and neck done that showed left ICA severe stenosis, also incidental finding of pulmonary embolism in RML. sinus tachy, HD stable.  - contacted transfer center for neuro, discussed findings, no tpa was recommended, 2/2 unclear time of onet of symptoms. also for significant left ICA stenosis, unlikely etiology as left sided weakness not explaned by left sided ICA stenosis. possible thromboembolic CVA as patient with h/o Afib.  - ordered  mg suppository. also started on heparin gtt.  - check trop, bnp for stratification of PE, echo.  - permissive hypertension x 24 hours.  - npo, speech and swallow eval., fall aspiration precautions, neuro checks  - telemonitoring  - Neuro consult Dr. Mgcee, for further recommendation, including MRI brain and left ICA stenosis intervention strategy.  - rest as above  - heparin gtt for dvt ppx.

## 2022-03-04 NOTE — H&P ADULT - CONVERSATION DETAILS
The son, Mr. Zaragoza, has been informed of the patient's condition, including high risk of acute decompensation. He understands the risk and benefit of treatment. He also wishes to keep his mother full code, as that was her wish. He, however, wants sometime to re-evaluate the decision, after the conversation in regards to resuscitative measure and morbidity.

## 2022-03-04 NOTE — ED PROVIDER NOTE - PROGRESS NOTE DETAILS
CT head - no ICH  xrays - no fracture (per my read)  CXR - no PNA  UA - pending  labs - WBC 15, pre renal  Pt still unable to ambulate and non verbal in ED. PCP not affiliated. Endorsed to unattached Dr Melendez and MAR Admitting resident alerted me that pt now more alert and moving RUE and RLE extensive (trying to hit MAR and myself), however appears to not be moving L extremities Admitting resident alerted me that pt now more alert and moving RUE and RLE extensive (trying to hit MAR and myself), however appears to not be moving L extremities. Initial exam was confounded by AMS, so unclear if stroke symptoms presents prior to arrival or developed in ED. Brought back for CTA head/neck and perfusion scan. NIH 15. Dysphagia screen per RN. Aspirin ordered. Admitting resident alerted me that pt now more alert and moving RUE and RLE extensive (trying to hit MAR and myself), however appears to not be moving L extremities. Initial exam was confounded by AMS, so unclear if stroke symptoms present prior to arrival or developed in ED. Brought back for CTA head/neck and perfusion scan. NIH 15. Dysphagia screen per RN. Aspirin ordered. Radiology verbally reported no stroke, but apparently PE noted

## 2022-03-04 NOTE — H&P ADULT - PROBLEM SELECTOR PLAN 3
- h/o afib, only on atenolol at home  - c/w heparin ggt  - hold atenolol for permissive HTN  - f/u EKG

## 2022-03-04 NOTE — H&P ADULT - PROBLEM SELECTOR PLAN 1
- p/w mental decompensation   - PE: total left-sided weakness, AAOx0, only response to pain  - CTH neg  - CTA N&H Severe/critical stenosis involving the supraclinoid segment of the left ICA with reconstitution of the bifurcation  - CT perfusion Small volume perfusion mismatch corresponds to the corona   radiata  - s/p ASA WI in ED  - c/w ASA  - neurocheck & telemonitoring  - f/u echo, cardiac enzyme, EKG    - neuro, Dr. Mcgee, consulted  - SnS consulted

## 2022-03-04 NOTE — ED PROVIDER NOTE - CLINICAL SUMMARY MEDICAL DECISION MAKING FREE TEXT BOX
Elderly F with recent fall and leg pain. Less verbal than baseline. Plan - labs, UA, imaging, anticipate admission.

## 2022-03-04 NOTE — H&P ADULT - HISTORY OF PRESENT ILLNESS
Patient is a 89yoF, AAOx2-3 & ambulates w/ assistance at baseline, w/ PMH of Afib (not on AC 2/2 high risk of fall) & HTN, p/w LLE pain. Patient's is a poor historian due to current condition; thus, information obtained from the son. Patient was in a good state of health until recent fall 2 weeks ago. No interval history is available as the son lives in the different state. On 3/3/2022, patient was noted to have a very poor response and answers questions only with 1-2 words, but was able to move all extremities.    In ED, patient noted to have complete left-sided weakness. She is also unable to speak, but remained agitated.

## 2022-03-05 DIAGNOSIS — Z29.9 ENCOUNTER FOR PROPHYLACTIC MEASURES, UNSPECIFIED: ICD-10-CM

## 2022-03-05 DIAGNOSIS — I26.99 OTHER PULMONARY EMBOLISM WITHOUT ACUTE COR PULMONALE: ICD-10-CM

## 2022-03-05 DIAGNOSIS — I10 ESSENTIAL (PRIMARY) HYPERTENSION: ICD-10-CM

## 2022-03-05 DIAGNOSIS — R55 SYNCOPE AND COLLAPSE: ICD-10-CM

## 2022-03-05 DIAGNOSIS — I65.22 OCCLUSION AND STENOSIS OF LEFT CAROTID ARTERY: ICD-10-CM

## 2022-03-05 DIAGNOSIS — M79.605 PAIN IN LEFT LEG: ICD-10-CM

## 2022-03-05 DIAGNOSIS — I48.91 UNSPECIFIED ATRIAL FIBRILLATION: ICD-10-CM

## 2022-03-05 DIAGNOSIS — I63.9 CEREBRAL INFARCTION, UNSPECIFIED: ICD-10-CM

## 2022-03-05 LAB
ALBUMIN SERPL ELPH-MCNC: 2.8 G/DL — LOW (ref 3.5–5)
ALP SERPL-CCNC: 83 U/L — SIGNIFICANT CHANGE UP (ref 40–120)
ALT FLD-CCNC: 22 U/L DA — SIGNIFICANT CHANGE UP (ref 10–60)
ANION GAP SERPL CALC-SCNC: 5 MMOL/L — SIGNIFICANT CHANGE UP (ref 5–17)
APTT BLD: 141 SEC — CRITICAL HIGH (ref 27.5–35.5)
APTT BLD: 167 SEC — CRITICAL HIGH (ref 27.5–35.5)
APTT BLD: 179.2 SEC — CRITICAL HIGH (ref 27.5–35.5)
APTT BLD: 35.8 SEC — HIGH (ref 27.5–35.5)
AST SERPL-CCNC: 38 U/L — SIGNIFICANT CHANGE UP (ref 10–40)
BASOPHILS # BLD AUTO: 0.07 K/UL — SIGNIFICANT CHANGE UP (ref 0–0.2)
BASOPHILS NFR BLD AUTO: 0.6 % — SIGNIFICANT CHANGE UP (ref 0–2)
BILIRUB SERPL-MCNC: 0.5 MG/DL — SIGNIFICANT CHANGE UP (ref 0.2–1.2)
BUN SERPL-MCNC: 25 MG/DL — HIGH (ref 7–18)
CALCIUM SERPL-MCNC: 9.3 MG/DL — SIGNIFICANT CHANGE UP (ref 8.4–10.5)
CHLORIDE SERPL-SCNC: 109 MMOL/L — HIGH (ref 96–108)
CK MB BLD-MCNC: 0.7 % — SIGNIFICANT CHANGE UP (ref 0–3.5)
CK MB BLD-MCNC: 0.7 % — SIGNIFICANT CHANGE UP (ref 0–3.5)
CK MB BLD-MCNC: 0.8 % — SIGNIFICANT CHANGE UP (ref 0–3.5)
CK MB CFR SERPL CALC: 5.7 NG/ML — HIGH (ref 0–3.6)
CK MB CFR SERPL CALC: 6.2 NG/ML — HIGH (ref 0–3.6)
CK MB CFR SERPL CALC: 7.9 NG/ML — HIGH (ref 0–3.6)
CK SERPL-CCNC: 1016 U/L — HIGH (ref 21–215)
CK SERPL-CCNC: 806 U/L — HIGH (ref 21–215)
CK SERPL-CCNC: 856 U/L — HIGH (ref 21–215)
CO2 SERPL-SCNC: 29 MMOL/L — SIGNIFICANT CHANGE UP (ref 22–31)
CREAT SERPL-MCNC: 0.76 MG/DL — SIGNIFICANT CHANGE UP (ref 0.5–1.3)
EGFR: 74 ML/MIN/1.73M2 — SIGNIFICANT CHANGE UP
EOSINOPHIL # BLD AUTO: 1.22 K/UL — HIGH (ref 0–0.5)
EOSINOPHIL NFR BLD AUTO: 10.4 % — HIGH (ref 0–6)
GLUCOSE SERPL-MCNC: 99 MG/DL — SIGNIFICANT CHANGE UP (ref 70–99)
HCT VFR BLD CALC: 35.1 % — SIGNIFICANT CHANGE UP (ref 34.5–45)
HCT VFR BLD CALC: 36.9 % — SIGNIFICANT CHANGE UP (ref 34.5–45)
HCT VFR BLD CALC: 37 % — SIGNIFICANT CHANGE UP (ref 34.5–45)
HGB BLD-MCNC: 11.2 G/DL — LOW (ref 11.5–15.5)
HGB BLD-MCNC: 11.7 G/DL — SIGNIFICANT CHANGE UP (ref 11.5–15.5)
HGB BLD-MCNC: 12.1 G/DL — SIGNIFICANT CHANGE UP (ref 11.5–15.5)
IMM GRANULOCYTES NFR BLD AUTO: 0.7 % — SIGNIFICANT CHANGE UP (ref 0–1.5)
INR BLD: 1.02 RATIO — SIGNIFICANT CHANGE UP (ref 0.88–1.16)
LYMPHOCYTES # BLD AUTO: 1.98 K/UL — SIGNIFICANT CHANGE UP (ref 1–3.3)
LYMPHOCYTES # BLD AUTO: 16.9 % — SIGNIFICANT CHANGE UP (ref 13–44)
MAGNESIUM SERPL-MCNC: 2.2 MG/DL — SIGNIFICANT CHANGE UP (ref 1.6–2.6)
MCHC RBC-ENTMCNC: 29.2 PG — SIGNIFICANT CHANGE UP (ref 27–34)
MCHC RBC-ENTMCNC: 29.5 PG — SIGNIFICANT CHANGE UP (ref 27–34)
MCHC RBC-ENTMCNC: 30 PG — SIGNIFICANT CHANGE UP (ref 27–34)
MCHC RBC-ENTMCNC: 31.6 GM/DL — LOW (ref 32–36)
MCHC RBC-ENTMCNC: 31.9 GM/DL — LOW (ref 32–36)
MCHC RBC-ENTMCNC: 32.8 GM/DL — SIGNIFICANT CHANGE UP (ref 32–36)
MCV RBC AUTO: 91.3 FL — SIGNIFICANT CHANGE UP (ref 80–100)
MCV RBC AUTO: 91.4 FL — SIGNIFICANT CHANGE UP (ref 80–100)
MCV RBC AUTO: 93.2 FL — SIGNIFICANT CHANGE UP (ref 80–100)
MONOCYTES # BLD AUTO: 1.24 K/UL — HIGH (ref 0–0.9)
MONOCYTES NFR BLD AUTO: 10.6 % — SIGNIFICANT CHANGE UP (ref 2–14)
NEUTROPHILS # BLD AUTO: 7.13 K/UL — SIGNIFICANT CHANGE UP (ref 1.8–7.4)
NEUTROPHILS NFR BLD AUTO: 60.8 % — SIGNIFICANT CHANGE UP (ref 43–77)
NRBC # BLD: 0 /100 WBCS — SIGNIFICANT CHANGE UP (ref 0–0)
PHOSPHATE SERPL-MCNC: 3.7 MG/DL — SIGNIFICANT CHANGE UP (ref 2.5–4.5)
PLATELET # BLD AUTO: 306 K/UL — SIGNIFICANT CHANGE UP (ref 150–400)
PLATELET # BLD AUTO: 326 K/UL — SIGNIFICANT CHANGE UP (ref 150–400)
PLATELET # BLD AUTO: 349 K/UL — SIGNIFICANT CHANGE UP (ref 150–400)
POTASSIUM SERPL-MCNC: 4.5 MMOL/L — SIGNIFICANT CHANGE UP (ref 3.5–5.3)
POTASSIUM SERPL-SCNC: 4.5 MMOL/L — SIGNIFICANT CHANGE UP (ref 3.5–5.3)
PROT SERPL-MCNC: 7 G/DL — SIGNIFICANT CHANGE UP (ref 6–8.3)
PROTHROM AB SERPL-ACNC: 12.1 SEC — SIGNIFICANT CHANGE UP (ref 10.5–13.4)
RBC # BLD: 3.84 M/UL — SIGNIFICANT CHANGE UP (ref 3.8–5.2)
RBC # BLD: 3.97 M/UL — SIGNIFICANT CHANGE UP (ref 3.8–5.2)
RBC # BLD: 4.04 M/UL — SIGNIFICANT CHANGE UP (ref 3.8–5.2)
RBC # FLD: 14 % — SIGNIFICANT CHANGE UP (ref 10.3–14.5)
RBC # FLD: 14.1 % — SIGNIFICANT CHANGE UP (ref 10.3–14.5)
RBC # FLD: 14.1 % — SIGNIFICANT CHANGE UP (ref 10.3–14.5)
SODIUM SERPL-SCNC: 143 MMOL/L — SIGNIFICANT CHANGE UP (ref 135–145)
TROPONIN I, HIGH SENSITIVITY RESULT: 57 NG/L — HIGH
TROPONIN I, HIGH SENSITIVITY RESULT: 67.4 NG/L — HIGH
TROPONIN I, HIGH SENSITIVITY RESULT: 68.3 NG/L — HIGH
WBC # BLD: 11.38 K/UL — HIGH (ref 3.8–10.5)
WBC # BLD: 11.72 K/UL — HIGH (ref 3.8–10.5)
WBC # BLD: 12.91 K/UL — HIGH (ref 3.8–10.5)
WBC # FLD AUTO: 11.38 K/UL — HIGH (ref 3.8–10.5)
WBC # FLD AUTO: 11.72 K/UL — HIGH (ref 3.8–10.5)
WBC # FLD AUTO: 12.91 K/UL — HIGH (ref 3.8–10.5)

## 2022-03-05 PROCEDURE — 99223 1ST HOSP IP/OBS HIGH 75: CPT

## 2022-03-05 PROCEDURE — 99233 SBSQ HOSP IP/OBS HIGH 50: CPT | Mod: GC

## 2022-03-05 RX ORDER — HEPARIN SODIUM 5000 [USP'U]/ML
700 INJECTION INTRAVENOUS; SUBCUTANEOUS
Qty: 25000 | Refills: 0 | Status: DISCONTINUED | OUTPATIENT
Start: 2022-03-05 | End: 2022-03-06

## 2022-03-05 RX ORDER — ATENOLOL 25 MG/1
50 TABLET ORAL DAILY
Refills: 0 | Status: DISCONTINUED | OUTPATIENT
Start: 2022-03-05 | End: 2022-03-15

## 2022-03-05 RX ORDER — HEPARIN SODIUM 5000 [USP'U]/ML
INJECTION INTRAVENOUS; SUBCUTANEOUS
Qty: 25000 | Refills: 0 | Status: DISCONTINUED | OUTPATIENT
Start: 2022-03-05 | End: 2022-03-05

## 2022-03-05 RX ADMIN — HEPARIN SODIUM 900 UNIT(S)/HR: 5000 INJECTION INTRAVENOUS; SUBCUTANEOUS at 01:16

## 2022-03-05 RX ADMIN — HEPARIN SODIUM 0 UNIT(S)/HR: 5000 INJECTION INTRAVENOUS; SUBCUTANEOUS at 15:41

## 2022-03-05 RX ADMIN — HEPARIN SODIUM 700 UNIT(S)/HR: 5000 INJECTION INTRAVENOUS; SUBCUTANEOUS at 16:51

## 2022-03-05 RX ADMIN — HEPARIN SODIUM 900 UNIT(S)/HR: 5000 INJECTION INTRAVENOUS; SUBCUTANEOUS at 09:12

## 2022-03-05 RX ADMIN — ATENOLOL 50 MILLIGRAM(S): 25 TABLET ORAL at 21:53

## 2022-03-05 RX ADMIN — HEPARIN SODIUM 900 UNIT(S)/HR: 5000 INJECTION INTRAVENOUS; SUBCUTANEOUS at 09:54

## 2022-03-05 RX ADMIN — AMLODIPINE BESYLATE 5 MILLIGRAM(S): 2.5 TABLET ORAL at 00:00

## 2022-03-05 RX ADMIN — HEPARIN SODIUM 700 UNIT(S)/HR: 5000 INJECTION INTRAVENOUS; SUBCUTANEOUS at 19:30

## 2022-03-05 NOTE — PROGRESS NOTE ADULT - PROBLEM SELECTOR PLAN 5
DVT: Hep ggt - h/o afib, only on atenolol at home  - c/w heparin ggt  - Can resume atenolol  - EKG showed narrow complex sinus tachycardia

## 2022-03-05 NOTE — PROGRESS NOTE ADULT - SUBJECTIVE AND OBJECTIVE BOX
PGY-1 Progress Note discussed with attending    PAGER #: [phone]] TILL 5:00 PM  PLEASE CONTACT ON CALL TEAM:  - On Call Team (Please refer to Dru) FROM 5:00 PM - 8:30PM  - Nightfloat Team FROM 8:30 -7:30 AM    HPI:  Patient is a 89yoF, AAOx2-3 & ambulates w/ assistance at baseline, w/ PMH of Afib (not on AC 2/2 high risk of fall) & HTN, p/w LLE pain. Patient's is a poor historian due to current condition; thus, information obtained from the son. Patient was in a good state of health until recent fall 2 weeks ago. No interval history is available as the son lives in the different state. On 3/3/2022, patient was noted to have a very poor response and answers questions only with 1-2 words, but was able to move all extremities.    In ED, patient noted to have complete left-sided weakness. She is also unable to speak, but remained agitated. (04 Mar 2022 23:54)      OVERNIGHT EVENTS:   - Patient transferred to the floor overnight.    MEDICATIONS  (STANDING):  aspirin Suppository 300 milliGRAM(s) Rectal once  heparin  Infusion.  Unit(s)/Hr (9 mL/Hr) IV Continuous <Continuous>    MEDICATIONS  (PRN):      Vital Signs Last 24 Hrs  T(C): 36.8 (05 Mar 2022 10:23), Max: 37.2 (04 Mar 2022 20:16)  T(F): 98.3 (05 Mar 2022 10:23), Max: 98.9 (04 Mar 2022 20:16)  HR: 76 (05 Mar 2022 10:23) (76 - 106)  BP: 133/86 (05 Mar 2022 10:23) (133/86 - 191/76)  BP(mean): --  RR: 19 (05 Mar 2022 10:23) (16 - 19)  SpO2: 95% (05 Mar 2022 10:23) (95% - 98%)    PHYSICAL EXAMINATION:  GENERAL: NAD, AAOx0  HEAD: AT/NC  EYES: conjunctiva and sclera clear  NECK: supple, No JVD noted, Normal thyroid  CHEST/LUNG: CTABL; no rales, rhonchi, wheezing, or rubs  HEART: regular rate and rhythm; no murmurs, rubs, or gallops  ABDOMEN: soft, nontender, nondistended; Bowel sounds present  EXTREMITIES:  R Knee swelling, R leg tender to manipulation  NEURO: Complete LUE hemiparesis, 2/5 LLE strength, Right UE/LE with tremor, Right eye nonresponsive to light  SKIN: warm dry                          11.2   11.72 )-----------( 326      ( 05 Mar 2022 10:08 )             35.1     03-05    143  |  109<H>  |  25<H>  ----------------------------<  99  4.5   |  29  |  0.76    Ca    9.3      05 Mar 2022 07:22  Phos  3.7     03-05  Mg     2.2     03-05    TPro  7.0  /  Alb  2.8<L>  /  TBili  0.5  /  DBili  x   /  AST  38  /  ALT  22  /  AlkPhos  83  03-05    LIVER FUNCTIONS - ( 05 Mar 2022 07:22 )  Alb: 2.8 g/dL / Pro: 7.0 g/dL / ALK PHOS: 83 U/L / ALT: 22 U/L DA / AST: 38 U/L / GGT: x           CARDIAC MARKERS ( 05 Mar 2022 07:22 )  x     / x     / 806 U/L / x     / 5.7 ng/mL  CARDIAC MARKERS ( 05 Mar 2022 05:08 )  x     / x     / 856 U/L / x     / 6.2 ng/mL  CARDIAC MARKERS ( 05 Mar 2022 00:52 )  x     / x     / 1016 U/L / x     / 7.9 ng/mL      PT/INR - ( 05 Mar 2022 00:52 )   PT: 12.1 sec;   INR: 1.02 ratio         PTT - ( 05 Mar 2022 07:21 )  PTT:141.0 sec  COVID-19 PCR: NotDetec (04 Mar 2022 20:15)      CAPILLARY BLOOD GLUCOSE          RADIOLOGY & ADDITIONAL TESTS:

## 2022-03-05 NOTE — PROGRESS NOTE ADULT - PROBLEM SELECTOR PLAN 3
- h/o afib, only on atenolol at home  - c/w heparin ggt  - Can resume atenolol  - EKG showed narrow complex sinus tachycardia - incidental finding of PE, on CTA neck  - c/w heparin ggt

## 2022-03-05 NOTE — PROGRESS NOTE ADULT - ASSESSMENT
89 yr F from home with PMHx of HTN, ?Afib, dementia presents to ED after fall vs Syncope. Admitted for syncope. Patient is a 89yoF, AAOx2-3 & ambulates w/ assistance at baseline, w/ PMH of Afib (not on AC 2/2 high risk of fall) & HTN, p/w LLE pain. Admitted for CVA

## 2022-03-05 NOTE — PROGRESS NOTE ADULT - PROBLEM SELECTOR PLAN 4
- h/o HTN, on atenolol at home  - Resume atenolol Pt with RLE tenderness and pain, no obvious internal/external rotation  s/p fall 2 weeks ago  Possible acute vs subacute fracture or dislocation  f/u xray of hips and knee

## 2022-03-05 NOTE — PROGRESS NOTE ADULT - PROBLEM SELECTOR PLAN 1
- p/w mental decompensation   - PE: total left-sided weakness, AAOx0, only response to pain  - CTH neg  - CTA N&H Severe/critical stenosis involving the supraclinoid segment of the left ICA with reconstitution of the bifurcation  - CT perfusion Small volume perfusion mismatch corresponds to the corona   radiata  - s/p ASA SD in ED  - c/w ASA  - neurocheck & telemonitoring  - f/u echo, cardiac enzyme, EKG    - neuro, Dr. Mcgee, consulted  - SnS consulted - p/w mental decompensation   - PE: total left-sided weakness, AAOx0, only response to pain  - CTH neg  - CT perfusion Small volume perfusion mismatch corresponds to the corona radiata bilaterally  - Trop 57>67.4>68.3  - c/w ASA  - Pt on heparin drip for PE  -f/u Neuro Recs for MRI and AC, Dr. Kumar consulted  - neurocheck & telemonitoring  - f/u echo, cardiac enzyme, EKG  - SnS consulted

## 2022-03-05 NOTE — PATIENT PROFILE ADULT - FALL HARM RISK - HARM RISK INTERVENTIONS

## 2022-03-05 NOTE — PROGRESS NOTE ADULT - PROBLEM SELECTOR PLAN 2
- incidental finding of PE, on CTA neck  - c/w heparin ggt - CTA N&H Severe/critical stenosis involving the supraclinoid segment of the left ICA with reconstitution of the bifurcation  - Does not explain symptoms  -f/u neuro recs  - Possible vascular consult

## 2022-03-05 NOTE — ED ADULT NURSE NOTE - NS ED NURSE REPORT GIVEN DT
[facility]  Treatment Note  Name: Darlin Fernandez (Callen)  MRN: [id number]  : 2005  Date of Visit: 2017  Diagnoses:    299.00/F84.0 Autism Spectrum Disorder (ASD)  Treatment Modality: Group Therapy  Treatment Duration: 90 mins  Number of Participants: 6      Focus of Treatment: Lito is an 11-year-old boy who presents with social skills deficits. He attends treatment to learn skills related to making and keeping friends.      Mood: Happy  Affect: Slightly restricted  Behavior: Appropriate, distracted at times  Oriented: Oriented to person, place, and time      PEERS Program  Objectives/Goals: 1) Learn skills needed to making and keeping friends; 2) Increase social communication skills  Session 5: Appropriate Use of Humor  Session Goals:  The purpose of this session is to help adolescents identify the rules for appropriate use of humor.      Summary of Intervention:  Parents and adolescents are given instruction about how to use humor appropriately in social situations. Adolescents are taught to pay attention to their humor feedback and notice whether people are laughing at them or laughing with them.  and coaches conducted a role play in which the assessment of humor feedback was demonstrated. Behavioral rehearsal was conducted with the adolescents in which each adolescent practiced assessing humor feedback.      Homework assignment:  1. Adolescents were instructed to pay attention to their humor feedback and report back to the group about whether they are a joke teller or a joke .  2. Adolescents are assigned to find a new peer group and practice trading information with someone from that group.  3. Adolescents were assigned to call another group member on the phone during the week to practice trading information. Parents were instructed to supervise these calls and provide performance feedback as necessary. Adolescents and parents  negotiated the location of the parent during the phone call.  4. Adolescents were assigned to call an acquaintance outside of the group to practice trading information and find a common interest.  5. Adolescents were instructed to bring a favorite item to share with the group next week.      Response: Lito attended this session with his mother. He was in a pleasant mood and participated in all activities. Lito had some difficulty responding to large group discussions and was slighCallen and his mother reported that Lito completed his homework from the previous week. Lito participated in a phone call with another group member to practice trading information. Lito prepped for the call by rehearsing with his mother. Lito and his partner identified common interests in video games and YouTube. His mother reported that he did a nice job asking open-ended questions and asking follow-up questions. For future calls, he will work on using cover stories and starting and ending the conversation more smoothly. Lito did not have an out of group phone call this week, but his mother indicated that Lito would try to call a cousin next week. Lito is considering enrolling in some after school activities, such as a [employer] club to make friends. Lito will benefit from further instruction in social communication skills needed to make and keep friends.    Assessment: Lito is making good progress toward treatment goals.  Plan: Continue weekly treatment sessions.       Graciela Cordon, Ph.D., L.P.    Department of Pediatrics  [facility]   05-Mar-2022 00:31

## 2022-03-05 NOTE — CONSULT NOTE ADULT - SUBJECTIVE AND OBJECTIVE BOX
NEUROLOGY CONSULT NOTE    NAME:  KWAME RODRIGUEZ      ASSESSMENT:  89F with decreased verbal output and severe left-sided hemiparesis, concerning for right hemispheric ischemic stroke, in the setting of atrial fibrillation (not on anticoagulation), pulmonary embolism, and dementia      RECOMMENDATIONS:    1. Stroke workup  - MRI Brain approved (if no contraindications) to evaluate for location and extent of ischemic stroke  - Transthoracic Echocardiogram  - Telemetry monitoring  - Hemoglobin A1c  - Fasting lipid panel    2. Secondary stroke prevention  - Q4H Neurochecks & Vital signs  - Bedside swallow evaluation before administering any PO meds  - Low-dose heparin drip: No bolus, no re-bolus, weight-based dosing up to a maximum rate of 900 units/hr  - Bridge to warfarin, goal INR 1.0 - 2.0 (warfarin is chosen because it is relatively easy to reverse given the patient's fall risk  - Avoid additional Aspirin 81mg PO Daily at this time due to fall risk  - If LDL > 70, start Atorvastatin 40mg PO QHS (if LDL > 100, increase dosage to 80mg PO QHS)  - Treat BP if over 180/110 within first 24 hours of last seen normal; thereafter treat if over 140/90 (goal /80)  - PT/OT  - DVT ppx: SCDs, Anticoagulation as above        NOTE TO BE COMPLETED - PLEASE REFER TO ABOVE ONLY AND IGNORE INFORMATION BELOW    *******************************      CHIEF COMPLAINT:  Patient is a 89y old  Female who presents with a chief complaint of CVA (05 Mar 2022 13:26)      HPI:  Patient is a 89yoF, AAOx2-3 & ambulates w/ assistance at baseline, w/ PMH of Afib (not on AC 2/2 high risk of fall) & HTN, p/w LLE pain. Patient's is a poor historian due to current condition; thus, information obtained from the son. Patient was in a good state of health until recent fall 2 weeks ago. No interval history is available as the son lives in the different state. On 3/3/2022, patient was noted to have a very poor response and answers questions only with 1-2 words, but was able to move all extremities.    In ED, patient noted to have complete left-sided weakness. She is also unable to speak, but remained agitated. (04 Mar 2022 23:54)      NEURO HPI:      PAST MEDICAL & SURGICAL HISTORY:  HTN (hypertension)        MEDICATIONS:  aspirin Suppository 300 milliGRAM(s) Rectal once  ATENolol  Tablet 50 milliGRAM(s) Oral daily  heparin  Infusion. 600 Unit(s)/Hr IV Continuous <Continuous>      ALLERGIES:  No Known Allergies      FAMILY HISTORY:        SOCIAL HISTORY:  Denies alcohol, tobacco, or illicit drug use    REVIEW OF SYSTEMS:  GENERAL: No fever, weight changes, fatigue  EYES: No eye pain or discharge  EAR/NOSE/MOUTH/THROAT: No sinus or throat pain; No difficulty hearing  NECK: No pain or stiffness  RESPIRATORY: No cough, wheezing, chills, or hemoptysis  CARDIOVASCULAR: No chest pain, palpitations, shortness of breath, or dyspnea on exertion  GASTROINTESTINAL: No abdominal pain, nausea, vomiting, hematemesis, diarrhea, or constipation  GENITOURINARY: No dysuria, frequency, hematuria, or incontinence  SKIN: No rashes or lesions  ENDOCRINE: No heat or cold intolerance  HEMATOLOGIC: No easy bruising or bleeding  PSYCHIATRIC: No depression, anxiety, or mood swings  MUSCULOSKELETAL: No joint pain or swelling  NEUROLOGICAL: As per HPI        OBJECTIVE:    Vital Signs Last 24 Hrs  T(C): 36.8 (06 Mar 2022 01:37), Max: 36.8 (05 Mar 2022 10:23)  T(F): 98.2 (06 Mar 2022 01:37), Max: 98.3 (05 Mar 2022 10:23)  HR: 73 (06 Mar 2022 01:37) (72 - 100)  BP: 132/78 (06 Mar 2022 01:37) (132/77 - 154/86)  BP(mean): --  RR: 17 (06 Mar 2022 01:37) (16 - 19)  SpO2: 100% (06 Mar 2022 01:37) (95% - 100%)    General Examination:  General: No acute distress  HEENT: Atraumatic, Normocephalic  Respiratory: CTA B/l.  No crackles, rhonchi, or wheezes.  Cardiovascular: RRR.  Normal S1 & S2.  Normal b/l radial and pedal pulses.    Neurological Examination:  General / Mental Status: AAO x 3.  No aphasia or dysarthria.  Naming and repetition intact.  Cranial Nerves: VFF x 4.  PERRL.  EOMI x 2, No nystagmus or diplopia.  B/l V1-V3 equal and intact to light touch and pinprick.  Symmetric facial movement and palate elevation.  B/l hearing equal to finger rub.  5/5 strength with b/l sternocleidomastoid & trapezius.  Midline tongue protrusion, with no atrophy or fasciculations.  Motor: Normal bulk & tone in all four extremities.  5/5 strength throughout all four extremities.  No downward drift, rigidity, spasticity, or tremors in any of the four extremities.  Sensory: Intact to light touch and pinprick in all four extremities.  Negative Romberg.  Reflex: 2+ and symmetric at b/l biceps, triceps, brachioradialis, patellae, and ankles.  Downgoing toes b/l.  Coordination: No dysmetria with b/l finger-to-nose and heel raise tests.  Symmetric rapid alternating movements b/l.  Gait: Normal, narrow-based gait.  No difficulty with tiptoe, heel, and tandem gaits.        LABORATORY VALUES:                        11.7   11.38 )-----------( 306      ( 05 Mar 2022 22:54 )             37.0       03-05    143  |  109<H>  |  25<H>  ----------------------------<  99  4.5   |  29  |  0.76    Ca    9.3      05 Mar 2022 07:22  Phos  3.7     03-05  Mg     2.2     03-05    TPro  7.0  /  Alb  2.8<L>  /  TBili  0.5  /  DBili  x   /  AST  38  /  ALT  22  /  AlkPhos  83  03-05                          NEUROIMAGING:          Please contact the Neurology consult service with any neurological questions.    Rc Kumar MD   of Neurology  [facility] at [facility] NEUROLOGY CONSULT NOTE    NAME:  KWAME RODRIGUEZ      ASSESSMENT:  89F with decreased verbal output and severe left-sided hemiparesis, concerning for right hemispheric ischemic stroke, in the setting of atrial fibrillation (not on anticoagulation), pulmonary embolism, and dementia      RECOMMENDATIONS:    1. Stroke workup  - MRI Brain approved (if there are no contraindications) to evaluate for location and extent of ischemic stroke  - Transthoracic Echocardiogram  - Telemetry monitoring  - Hemoglobin A1c  - Fasting lipid panel    2. Secondary stroke prevention  - Q4H Neurochecks & Vital signs  - Bedside swallow evaluation before administering any PO meds  - Low-dose heparin drip: No bolus, no re-bolus, weight-based dosing up to a maximum rate of 900 units/hr  - Bridge to warfarin, goal INR 1.0 - 2.0 (warfarin is chosen because it is relatively easy to reverse given the patient's fall risk  - Avoid additional Aspirin 81mg PO Daily at this time due to fall risk  - If LDL > 70, start Atorvastatin 40mg PO QHS (if LDL > 100, increase dosage to 80mg PO QHS)  - Treat BP if over 180/110 within first 24 hours of last seen normal; thereafter treat if over 140/90 (goal /80)  - PT/OT  - DVT ppx: SCDs, Anticoagulation as above        *******************************      CHIEF COMPLAINT:  Patient is a 89y old  Female who presents with a chief complaint of CVA (05 Mar 2022 13:26)      HPI:  Patient is a 89yoF, AAOx2-3 & ambulates w/ assistance at baseline, w/ PMH of Afib (not on AC 2/2 high risk of fall) & HTN, p/w LLE pain. Patient's is a poor historian due to current condition; thus, information obtained from the son. Patient was in a good state of health until recent fall 2 weeks ago. No interval history is available as the son lives in the different state. On 3/3/2022, patient was noted to have a very poor response and answers questions only with 1-2 words, but was able to move all extremities. In ED, patient noted to have complete left-sided weakness. She is also unable to speak, but remained agitated. (04 Mar 2022 23:54)      NEURO HPI:  89F with diagnosis of dementia, presenting with decreased verbal output and severe left-sided weakness.      PAST MEDICAL & SURGICAL HISTORY:  HTN (hypertension)      MEDICATIONS:  aspirin Suppository 300 milliGRAM(s) Rectal once  ATENolol  Tablet 50 milliGRAM(s) Oral daily  heparin  Infusion. 600 Unit(s)/Hr IV Continuous <Continuous>      ALLERGIES:  No Known Allergies      FAMILY HISTORY:  No reported family history of stroke      SOCIAL HISTORY:  No reported alcohol, tobacco, or illicit drug use      REVIEW OF SYSTEMS: Limited due to dementia  GENERAL: No fever  EYES: No eye discharge  EAR/NOSE/MOUTH/THROAT: No sinus discharge  NECK: No stiffness  RESPIRATORY: No cough  CARDIOVASCULAR: No shortness of breath  GASTROINTESTINAL: No nausea, vomiting, hematemesis  GENITOURINARY: No frequency, hematuria  SKIN: No rashes or lesions  ENDOCRINE: No reported heat or cold intolerance  HEMATOLOGIC: No reported easy bruising or bleeding  PSYCHIATRIC: Agitation present  MUSCULOSKELETAL: No joint swelling  NEUROLOGICAL: As per HPI        OBJECTIVE:    Vital Signs Last 24 Hrs  T(C): 36.8 (06 Mar 2022 01:37), Max: 36.8 (05 Mar 2022 10:23)  T(F): 98.2 (06 Mar 2022 01:37), Max: 98.3 (05 Mar 2022 10:23)  HR: 73 (06 Mar 2022 01:37) (72 - 100)  BP: 132/78 (06 Mar 2022 01:37) (132/77 - 154/86)  RR: 17 (06 Mar 2022 01:37) (16 - 19)  SpO2: 100% (06 Mar 2022 01:37) (95% - 100%)    General Exam:  General: No apparent acute distress  Respiratory: CTAB/l.  No crackles, rhonchi, or wheezes.  Cardiovascular: RRR, No murmurs, Full b/l radial and pedal pulses    Neurological Exam:  General / Mental Status: Minimally verbal, with mild expressive aphasia but no dysarthria.  Does not follow most commands.  Neurological exam is limited.  Cranial Nerves: PERRL, Blink to threat absent b/l, Does not track finger movements with eyes b/l.  No response to pinprick at b/l V1-V3 nor to snap at b/l ears.  No apparent facial asymmetry.  Midline palate and tongue.  No resistance to passive motion of neck b/l.  Motor: Diminished bulk and tone in all four extremities.  Patient does not participate in formal muscle strength testing, but moves right arm and right leg spontaneously without apparent weakness, but can only move left arm and left leg in the plane of the bed.  Hand  is absent on left, weak on right.  Sensation: Withdraws to pinch in all four extremities.  Reflexes: 1+ and symmetric at b/l biceps, triceps, brachioradialis, patellae, and ankles.  Toes mute b/l.  Unable to assess coordination, Romberg sign, or gait in due to patient not following multi-step commands.    NIHSS Score:    LOC - 0  LOC Questions - 1  LOC Commands - 1  Gaze Preference - 0  Visual Fields - 0  Facial Palsy - 0  Motor Arm Left - 3  Motor Arm Right - 1  Motor Leg Left - 3  Motor Leg Right - 1  Limb Ataxia - 0  Sensory - 0  Language - 1  Speech - 0  Extinction - 0    NIHSS Score Total: 11 - No IV tPA due to patient presenting outside of time window    Modified [place] Scale: 4        LABORATORY VALUES:                        11.7   11.38 )-----------( 306      ( 05 Mar 2022 22:54 )             37.0       03-05    143  |  109<H>  |  25<H>  ----------------------------<  99  4.5   |  29  |  0.76    Ca    9.3      05 Mar 2022 07:22  Phos  3.7     03-05  Mg     2.2     03-05    TPro  7.0  /  Alb  2.8<L>  /  TBili  0.5  /  DBili  x   /  AST  38  /  ALT  22  /  AlkPhos  83  03-05        NEUROIMAGING:      CT Head & CTA Head/Neck & CT Perfusion Head (3/4/22):  - No acute intracranial structural abnormality  - Severe left ICA stenosis  - No other intracranial or neck vessel abnormalities  - B/l corona radiata hypoperfusion, possibly artifactual  - Incidental right middle lobe pulmonary embolus        Please contact the Neurology consult service with any neurological questions.    Rc Kumar MD   of Neurology  [facility] at [facility]

## 2022-03-05 NOTE — PROGRESS NOTE ADULT - ATTENDING COMMENTS
Discussed with Neurology.  Will complete stroke w/u, hold ASA, hold statin pending lipids.  MRI, r/o infarct.  Will limit IV heparin to < 900 units/hour.  Discontinue, if any evidence of CNS bleed.

## 2022-03-06 LAB
APTT BLD: 123.1 SEC — CRITICAL HIGH (ref 27.5–35.5)
APTT BLD: 131.7 SEC — CRITICAL HIGH (ref 27.5–35.5)
APTT BLD: 83.3 SEC — HIGH (ref 27.5–35.5)
HCT VFR BLD CALC: 37.8 % — SIGNIFICANT CHANGE UP (ref 34.5–45)
HGB BLD-MCNC: 12.5 G/DL — SIGNIFICANT CHANGE UP (ref 11.5–15.5)
MCHC RBC-ENTMCNC: 30 PG — SIGNIFICANT CHANGE UP (ref 27–34)
MCHC RBC-ENTMCNC: 33.1 GM/DL — SIGNIFICANT CHANGE UP (ref 32–36)
MCV RBC AUTO: 90.6 FL — SIGNIFICANT CHANGE UP (ref 80–100)
NRBC # BLD: 0 /100 WBCS — SIGNIFICANT CHANGE UP (ref 0–0)
PLATELET # BLD AUTO: 355 K/UL — SIGNIFICANT CHANGE UP (ref 150–400)
RBC # BLD: 4.17 M/UL — SIGNIFICANT CHANGE UP (ref 3.8–5.2)
RBC # FLD: 13.9 % — SIGNIFICANT CHANGE UP (ref 10.3–14.5)
WBC # BLD: 10.18 K/UL — SIGNIFICANT CHANGE UP (ref 3.8–10.5)
WBC # FLD AUTO: 10.18 K/UL — SIGNIFICANT CHANGE UP (ref 3.8–10.5)

## 2022-03-06 PROCEDURE — 99233 SBSQ HOSP IP/OBS HIGH 50: CPT | Mod: GC

## 2022-03-06 RX ORDER — AMLODIPINE BESYLATE 2.5 MG/1
5 TABLET ORAL ONCE
Refills: 0 | Status: COMPLETED | OUTPATIENT
Start: 2022-03-06 | End: 2022-03-06

## 2022-03-06 RX ORDER — HEPARIN SODIUM 5000 [USP'U]/ML
600 INJECTION INTRAVENOUS; SUBCUTANEOUS
Qty: 25000 | Refills: 0 | Status: DISCONTINUED | OUTPATIENT
Start: 2022-03-06 | End: 2022-03-07

## 2022-03-06 RX ADMIN — ATENOLOL 50 MILLIGRAM(S): 25 TABLET ORAL at 05:00

## 2022-03-06 RX ADMIN — HEPARIN SODIUM 600 UNIT(S)/HR: 5000 INJECTION INTRAVENOUS; SUBCUTANEOUS at 06:49

## 2022-03-06 RX ADMIN — HEPARIN SODIUM 0 UNIT(S)/HR: 5000 INJECTION INTRAVENOUS; SUBCUTANEOUS at 00:04

## 2022-03-06 RX ADMIN — HEPARIN SODIUM 500 UNIT(S)/HR: 5000 INJECTION INTRAVENOUS; SUBCUTANEOUS at 13:19

## 2022-03-06 RX ADMIN — HEPARIN SODIUM 500 UNIT(S)/HR: 5000 INJECTION INTRAVENOUS; SUBCUTANEOUS at 19:29

## 2022-03-06 RX ADMIN — HEPARIN SODIUM 600 UNIT(S)/HR: 5000 INJECTION INTRAVENOUS; SUBCUTANEOUS at 07:37

## 2022-03-06 RX ADMIN — HEPARIN SODIUM 500 UNIT(S)/HR: 5000 INJECTION INTRAVENOUS; SUBCUTANEOUS at 19:49

## 2022-03-06 RX ADMIN — HEPARIN SODIUM 600 UNIT(S)/HR: 5000 INJECTION INTRAVENOUS; SUBCUTANEOUS at 00:12

## 2022-03-06 RX ADMIN — AMLODIPINE BESYLATE 5 MILLIGRAM(S): 2.5 TABLET ORAL at 12:16

## 2022-03-06 RX ADMIN — HEPARIN SODIUM 600 UNIT(S)/HR: 5000 INJECTION INTRAVENOUS; SUBCUTANEOUS at 01:36

## 2022-03-06 RX ADMIN — HEPARIN SODIUM 400 UNIT(S)/HR: 5000 INJECTION INTRAVENOUS; SUBCUTANEOUS at 22:27

## 2022-03-06 RX ADMIN — HEPARIN SODIUM 0 UNIT(S)/HR: 5000 INJECTION INTRAVENOUS; SUBCUTANEOUS at 21:25

## 2022-03-06 NOTE — PROGRESS NOTE ADULT - PROBLEM SELECTOR PLAN 5
- h/o afib, only on atenolol at home  - c/w heparin ggt  - Can resume atenolol  - EKG showed narrow complex sinus tachycardia

## 2022-03-06 NOTE — PROGRESS NOTE ADULT - SUBJECTIVE AND OBJECTIVE BOX
MEDICAL ATTENDING NOTE  Patient is a 89y old  Female who presents with a chief complaint of CVA (05 Mar 2022 21:39)      HPI:  Patient is a 89yoF, AAOx2-3 & ambulates w/ assistance at baseline, w/ PMH of Afib (not on AC 2/2 high risk of fall) & HTN, p/w LLE pain. Patient's is a poor historian due to current condition; thus, information obtained from the son. Patient was in a good state of health until recent fall 2 weeks ago. No interval history is available as the son lives in the different state. On 3/3/2022, patient was noted to have a very poor response and answers questions only with 1-2 words, but was able to move all extremities.    In ED, patient noted to have complete left-sided weakness. She is also unable to speak, but remained agitated. (04 Mar 2022 23:54)      INTERVAL HPI/OVERNIGHT EVENTS: no new complaints    MEDICATIONS  (STANDING):  aspirin Suppository 300 milliGRAM(s) Rectal once  ATENolol  Tablet 50 milliGRAM(s) Oral daily  heparin  Infusion. 600 Unit(s)/Hr (6 mL/Hr) IV Continuous <Continuous>    MEDICATIONS  (PRN):      __________________________________________________  REVIEW OF SYSTEMS:    Unable to elicit      Vital Signs Last 24 Hrs  T(C): 37.2 (06 Mar 2022 13:17), Max: 37.2 (06 Mar 2022 13:17)  T(F): 98.9 (06 Mar 2022 13:17), Max: 98.9 (06 Mar 2022 13:17)  HR: 72 (06 Mar 2022 15:38) (70 - 116)  BP: 149/70 (06 Mar 2022 15:38) (132/78 - 175/78)  BP(mean): --  RR: 19 (06 Mar 2022 13:17) (17 - 19)  SpO2: 98% (06 Mar 2022 15:38) (98% - 100%)    ________________________________________________  PHYSICAL EXAM:  GENERAL: Alert, cachectic woman, verbal, suspicious  HEENT: Normocephalic;  conjunctivae and sclerae clear; moist mucous membranes;   NECK : supple  CHEST/LUNG: Clear to auscultation bilaterally with good air entry   HEART: S1 S2  regular; no murmurs, gallops or rubs  ABDOMEN: Soft, Nontender, Nondistended; Bowel sounds present  EXTREMITIES: no cyanosis; no edema; no calf tenderness, LLE is laterally rotated.   SKIN: warm and dry; no rash  NERVOUS SYSTEM:  Awake and alert; Recognizers her sons.  Left UE plegia, LLE pain    _________________________________________________  LABS:                        12.5   10.18 )-----------( 355      ( 06 Mar 2022 12:36 )             37.8     03-05    143  |  109<H>  |  25<H>  ----------------------------<  99  4.5   |  29  |  0.76    Ca    9.3      05 Mar 2022 07:22  Phos  3.7     03-05  Mg     2.2     03-05    TPro  7.0  /  Alb  2.8<L>  /  TBili  0.5  /  DBili  x   /  AST  38  /  ALT  22  /  AlkPhos  83  03-05    PT/INR - ( 05 Mar 2022 00:52 )   PT: 12.1 sec;   INR: 1.02 ratio         PTT - ( 06 Mar 2022 12:36 )  PTT:123.1 sec    CAPILLARY BLOOD GLUCOSE

## 2022-03-06 NOTE — PROGRESS NOTE ADULT - PROBLEM SELECTOR PLAN 4
Pt with RLE tenderness and pain, no obvious internal/external rotation  s/p fall 2 weeks ago  Possible acute vs subacute fracture or dislocation  f/u xray of hips and knee

## 2022-03-06 NOTE — PROGRESS NOTE ADULT - ATTENDING COMMENTS
Patient is alert today.  Sons are at the bedside.  She has had 24/7 care at home.    Alert, suspcious. cachectic  Vital Signs Last 24 Hrs  T(C): 37.2 (06 Mar 2022 13:17), Max: 37.2 (06 Mar 2022 13:17)  T(F): 98.9 (06 Mar 2022 13:17), Max: 98.9 (06 Mar 2022 13:17)  HR: 72 (06 Mar 2022 15:38) (70 - 116)  BP: 149/70 (06 Mar 2022 15:38) (132/78 - 175/78)  BP(mean): --  RR: 19 (06 Mar 2022 13:17) (17 - 19)  SpO2: 98% (06 Mar 2022 15:38) (98% - 100%)  Lungs, clear  Cor, RRR  Abdomen, soft  LUE plegia  LLE externally rotated, weak  Labs, as above.   < from: CT Angio Head w/ IV Cont (03.04.22 @ 22:19) >      The area of calculated perfusion mismatch corresponds to the bilateral   corona radiata, and does not correspond to a single vascular territory.    < end of copied text >    < from: CT Angio Head w/ IV Cont (03.04.22 @ 22:19) >    CTA BRAIN:  1. Severe/critical stenosis involving the supraclinoid segment of the   left ICA with reconstitution of the bifurcation.    CTA NECK:  1. Patent cervical vasculature. No flow limiting stenosis or occlusion.  2. Incompletely imaged pulmonary embolus in the right middle lobe    < end of copied text >    IMP:  CNS vascular disease is likely diffuse, as major defect does not correlated with the acute clinical finding.           LLE pain, externally rotated, r/o pelvic fracture.  X-ray is inconclusive.  Plan:  PT consultation, appreciated.  Sons are interested in DIANDRA.   Discussed with Neurology.  Will complete stroke w/u, hold ASA, hold statin pending lipids.  MRI, r/o infarct.  Will limit IV heparin to < 900 units/hour.  Discontinue, if any evidence of CNS bleed. Patient is alert today.  Sons are at the bedside.  She has had 24/7 care at home.    Alert, suspcious. cachectic  Vital Signs Last 24 Hrs  T(C): 37.2 (06 Mar 2022 13:17), Max: 37.2 (06 Mar 2022 13:17)  T(F): 98.9 (06 Mar 2022 13:17), Max: 98.9 (06 Mar 2022 13:17)  HR: 72 (06 Mar 2022 15:38) (70 - 116)  BP: 149/70 (06 Mar 2022 15:38) (132/78 - 175/78)  BP(mean): --  RR: 19 (06 Mar 2022 13:17) (17 - 19)  SpO2: 98% (06 Mar 2022 15:38) (98% - 100%)  Lungs, clear  Cor, RRR  Abdomen, soft  LUE plegia  LLE externally rotated, weak  Labs, as above.   < from: CT Angio Head w/ IV Cont (03.04.22 @ 22:19) >      The area of calculated perfusion mismatch corresponds to the bilateral   corona radiata, and does not correspond to a single vascular territory.    < end of copied text >    < from: CT Angio Head w/ IV Cont (03.04.22 @ 22:19) >    CTA BRAIN:  1. Severe/critical stenosis involving the supraclinoid segment of the   left ICA with reconstitution of the bifurcation.    CTA NECK:  1. Patent cervical vasculature. No flow limiting stenosis or occlusion.  2. Incompletely imaged pulmonary embolus in the right middle lobe    < end of copied text >    IMP:  CNS vascular disease is likely diffuse, as major defect does not correlated with the acute clinical finding.           LLE pain, externally rotated, r/o pelvic fracture.  X-ray is inconclusive.           Pulmonary embolism, on IV heparin.   Plan:  PT consultation, appreciated.  Sons are interested in DIANDRA.   Discussed with Neurology.  Will complete stroke w/u, hold ASA, hold statin pending lipids.  MRI, r/o infarct.  Will limit IV heparin to < 900 units/hour.  Discontinue, if any evidence of CNS bleed.

## 2022-03-06 NOTE — PHYSICAL THERAPY INITIAL EVALUATION ADULT - LEVEL OF INDEPENDENCE: SIT/SUPINE, REHAB EVAL
moderate assist (50% patients effort) Calcipotriene Counseling:  I discussed with the patient the risks of calcipotriene including but not limited to erythema, scaling, itching, and irritation.

## 2022-03-06 NOTE — PROGRESS NOTE ADULT - PROBLEM SELECTOR PLAN 1
- p/w mental decompensation   - PE: total left-sided weakness, AAOx0, only response to pain  - CTH neg  - CT perfusion Small volume perfusion mismatch corresponds to the corona radiata bilaterally  - Trop 57>67.4>68.3  - c/w ASA  - Pt on heparin drip for PE  -f/u Neuro Recs for MRI and AC, Dr. Kumar consulted  - neurocheck & telemonitoring  - f/u echo, cardiac enzyme, EKG  - SnS consulted

## 2022-03-06 NOTE — PHYSICAL THERAPY INITIAL EVALUATION ADULT - DIAGNOSIS, PT EVAL
Patient presented with impairments in standing balance and was unable to perform ambulation at this time

## 2022-03-06 NOTE — PROGRESS NOTE ADULT - PROBLEM SELECTOR PLAN 2
- CTA N&H Severe/critical stenosis involving the supraclinoid segment of the left ICA with reconstitution of the bifurcation  - Does not explain symptoms  -f/u neuro recs  - Possible vascular consult

## 2022-03-07 DIAGNOSIS — N94.9 UNSPECIFIED CONDITION ASSOCIATED WITH FEMALE GENITAL ORGANS AND MENSTRUAL CYCLE: ICD-10-CM

## 2022-03-07 LAB
A1C WITH ESTIMATED AVERAGE GLUCOSE RESULT: 5.1 % — SIGNIFICANT CHANGE UP (ref 4–5.6)
ALBUMIN SERPL ELPH-MCNC: 3.1 G/DL — LOW (ref 3.5–5)
ALP SERPL-CCNC: 77 U/L — SIGNIFICANT CHANGE UP (ref 40–120)
ALT FLD-CCNC: 20 U/L DA — SIGNIFICANT CHANGE UP (ref 10–60)
ANION GAP SERPL CALC-SCNC: 9 MMOL/L — SIGNIFICANT CHANGE UP (ref 5–17)
APTT BLD: 61.8 SEC — HIGH (ref 27.5–35.5)
AST SERPL-CCNC: 22 U/L — SIGNIFICANT CHANGE UP (ref 10–40)
BASOPHILS # BLD AUTO: 0.05 K/UL — SIGNIFICANT CHANGE UP (ref 0–0.2)
BASOPHILS NFR BLD AUTO: 0.6 % — SIGNIFICANT CHANGE UP (ref 0–2)
BILIRUB SERPL-MCNC: 0.5 MG/DL — SIGNIFICANT CHANGE UP (ref 0.2–1.2)
BUN SERPL-MCNC: 37 MG/DL — HIGH (ref 7–18)
CALCIUM SERPL-MCNC: 9.8 MG/DL — SIGNIFICANT CHANGE UP (ref 8.4–10.5)
CHLORIDE SERPL-SCNC: 110 MMOL/L — HIGH (ref 96–108)
CHOLEST SERPL-MCNC: 254 MG/DL — HIGH
CO2 SERPL-SCNC: 23 MMOL/L — SIGNIFICANT CHANGE UP (ref 22–31)
CREAT SERPL-MCNC: 0.71 MG/DL — SIGNIFICANT CHANGE UP (ref 0.5–1.3)
EGFR: 81 ML/MIN/1.73M2 — SIGNIFICANT CHANGE UP
EOSINOPHIL # BLD AUTO: 0.03 K/UL — SIGNIFICANT CHANGE UP (ref 0–0.5)
EOSINOPHIL NFR BLD AUTO: 0.3 % — SIGNIFICANT CHANGE UP (ref 0–6)
ESTIMATED AVERAGE GLUCOSE: 100 MG/DL — SIGNIFICANT CHANGE UP (ref 68–114)
GLUCOSE SERPL-MCNC: 84 MG/DL — SIGNIFICANT CHANGE UP (ref 70–99)
HCT VFR BLD CALC: 39.5 % — SIGNIFICANT CHANGE UP (ref 34.5–45)
HDLC SERPL-MCNC: 86 MG/DL — SIGNIFICANT CHANGE UP
HGB BLD-MCNC: 12.5 G/DL — SIGNIFICANT CHANGE UP (ref 11.5–15.5)
IMM GRANULOCYTES NFR BLD AUTO: 0.7 % — SIGNIFICANT CHANGE UP (ref 0–1.5)
LIPID PNL WITH DIRECT LDL SERPL: 148 MG/DL — HIGH
LYMPHOCYTES # BLD AUTO: 1.61 K/UL — SIGNIFICANT CHANGE UP (ref 1–3.3)
LYMPHOCYTES # BLD AUTO: 17.8 % — SIGNIFICANT CHANGE UP (ref 13–44)
MAGNESIUM SERPL-MCNC: 2 MG/DL — SIGNIFICANT CHANGE UP (ref 1.6–2.6)
MCHC RBC-ENTMCNC: 29.1 PG — SIGNIFICANT CHANGE UP (ref 27–34)
MCHC RBC-ENTMCNC: 31.6 GM/DL — LOW (ref 32–36)
MCV RBC AUTO: 92.1 FL — SIGNIFICANT CHANGE UP (ref 80–100)
MONOCYTES # BLD AUTO: 0.54 K/UL — SIGNIFICANT CHANGE UP (ref 0–0.9)
MONOCYTES NFR BLD AUTO: 6 % — SIGNIFICANT CHANGE UP (ref 2–14)
NEUTROPHILS # BLD AUTO: 6.75 K/UL — SIGNIFICANT CHANGE UP (ref 1.8–7.4)
NEUTROPHILS NFR BLD AUTO: 74.6 % — SIGNIFICANT CHANGE UP (ref 43–77)
NON HDL CHOLESTEROL: 168 MG/DL — HIGH
NRBC # BLD: 0 /100 WBCS — SIGNIFICANT CHANGE UP (ref 0–0)
PHOSPHATE SERPL-MCNC: 3.6 MG/DL — SIGNIFICANT CHANGE UP (ref 2.5–4.5)
PLATELET # BLD AUTO: 382 K/UL — SIGNIFICANT CHANGE UP (ref 150–400)
POTASSIUM SERPL-MCNC: 3.8 MMOL/L — SIGNIFICANT CHANGE UP (ref 3.5–5.3)
POTASSIUM SERPL-SCNC: 3.8 MMOL/L — SIGNIFICANT CHANGE UP (ref 3.5–5.3)
PROT SERPL-MCNC: 7.2 G/DL — SIGNIFICANT CHANGE UP (ref 6–8.3)
RBC # BLD: 4.29 M/UL — SIGNIFICANT CHANGE UP (ref 3.8–5.2)
RBC # FLD: 13.6 % — SIGNIFICANT CHANGE UP (ref 10.3–14.5)
SODIUM SERPL-SCNC: 142 MMOL/L — SIGNIFICANT CHANGE UP (ref 135–145)
TRIGL SERPL-MCNC: 102 MG/DL — SIGNIFICANT CHANGE UP
WBC # BLD: 9.04 K/UL — SIGNIFICANT CHANGE UP (ref 3.8–10.5)
WBC # FLD AUTO: 9.04 K/UL — SIGNIFICANT CHANGE UP (ref 3.8–10.5)

## 2022-03-07 PROCEDURE — 72192 CT PELVIS W/O DYE: CPT | Mod: 26

## 2022-03-07 PROCEDURE — 99233 SBSQ HOSP IP/OBS HIGH 50: CPT | Mod: GC

## 2022-03-07 PROCEDURE — 70551 MRI BRAIN STEM W/O DYE: CPT | Mod: 26

## 2022-03-07 RX ORDER — WARFARIN SODIUM 2.5 MG/1
3 TABLET ORAL ONCE
Refills: 0 | Status: COMPLETED | OUTPATIENT
Start: 2022-03-07 | End: 2022-03-07

## 2022-03-07 RX ORDER — ENOXAPARIN SODIUM 100 MG/ML
30 INJECTION SUBCUTANEOUS EVERY 24 HOURS
Refills: 0 | Status: DISCONTINUED | OUTPATIENT
Start: 2022-03-07 | End: 2022-03-10

## 2022-03-07 RX ORDER — LISINOPRIL 2.5 MG/1
2.5 TABLET ORAL DAILY
Refills: 0 | Status: DISCONTINUED | OUTPATIENT
Start: 2022-03-07 | End: 2022-03-09

## 2022-03-07 RX ORDER — MIDAZOLAM HYDROCHLORIDE 1 MG/ML
1.5 INJECTION, SOLUTION INTRAMUSCULAR; INTRAVENOUS ONCE
Refills: 0 | Status: DISCONTINUED | OUTPATIENT
Start: 2022-03-07 | End: 2022-03-08

## 2022-03-07 RX ORDER — ENOXAPARIN SODIUM 100 MG/ML
50 INJECTION SUBCUTANEOUS EVERY 12 HOURS
Refills: 0 | Status: DISCONTINUED | OUTPATIENT
Start: 2022-03-07 | End: 2022-03-07

## 2022-03-07 RX ORDER — MIDAZOLAM HYDROCHLORIDE 1 MG/ML
1 INJECTION, SOLUTION INTRAMUSCULAR; INTRAVENOUS ONCE
Refills: 0 | Status: DISCONTINUED | OUTPATIENT
Start: 2022-03-07 | End: 2022-03-07

## 2022-03-07 RX ORDER — ATORVASTATIN CALCIUM 80 MG/1
80 TABLET, FILM COATED ORAL AT BEDTIME
Refills: 0 | Status: DISCONTINUED | OUTPATIENT
Start: 2022-03-07 | End: 2022-03-15

## 2022-03-07 RX ORDER — LISINOPRIL 2.5 MG/1
10 TABLET ORAL ONCE
Refills: 0 | Status: COMPLETED | OUTPATIENT
Start: 2022-03-07 | End: 2022-03-07

## 2022-03-07 RX ADMIN — LISINOPRIL 2.5 MILLIGRAM(S): 2.5 TABLET ORAL at 21:02

## 2022-03-07 RX ADMIN — ENOXAPARIN SODIUM 30 MILLIGRAM(S): 100 INJECTION SUBCUTANEOUS at 21:01

## 2022-03-07 RX ADMIN — WARFARIN SODIUM 3 MILLIGRAM(S): 2.5 TABLET ORAL at 21:02

## 2022-03-07 RX ADMIN — ATENOLOL 50 MILLIGRAM(S): 25 TABLET ORAL at 05:56

## 2022-03-07 RX ADMIN — HEPARIN SODIUM 400 UNIT(S)/HR: 5000 INJECTION INTRAVENOUS; SUBCUTANEOUS at 07:33

## 2022-03-07 RX ADMIN — ATORVASTATIN CALCIUM 80 MILLIGRAM(S): 80 TABLET, FILM COATED ORAL at 21:02

## 2022-03-07 RX ADMIN — HEPARIN SODIUM 400 UNIT(S)/HR: 5000 INJECTION INTRAVENOUS; SUBCUTANEOUS at 05:54

## 2022-03-07 NOTE — DIETITIAN INITIAL EVALUATION ADULT. - PERTINENT MEDS FT
MEDICATIONS  (STANDING):  aspirin Suppository 300 milliGRAM(s) Rectal once  ATENolol  Tablet 50 milliGRAM(s) Oral daily  heparin  Infusion. 600 Unit(s)/Hr (6 mL/Hr) IV Continuous <Continuous>

## 2022-03-07 NOTE — ADVANCED PRACTICE NURSE CONSULT - ASSESSMENT
This is a 89yr old female patient admitted for Leg Pain, presenting with the following:  -There is a Stage 1 Pressure Injury to the Bilateral Heels, as evident by non-blanchable erythema  -There is a Stage 2 Pressure Injury to the Coccyx, as evident by an intact fluid filled Bullae   -There is a R. Elbow scabbed Abrasion without drainage or signs of infection

## 2022-03-07 NOTE — SWALLOW BEDSIDE ASSESSMENT ADULT - SWALLOW EVAL: DIAGNOSIS
Pt p/w oral dysphagia w/ psychogenic component c/b poor oral aperture (resulting in limited PO acceptance) prolonged bolus formation, & slow A-P transport; However, oropharyngeal swallow was intact & timely w/ no overt s/s of laryngeal penetration or aspiration at this exam. Pt able to drink > eat 2/2 posturing; benefits from straw.

## 2022-03-07 NOTE — DIETITIAN NUTRITION RISK NOTIFICATION - TREATMENT: THE FOLLOWING DIET HAS BEEN RECOMMENDED
Advance diet or consider alternate nutrition support if NPO prolonged further as medically feasible; Pending Swallow evaluation   Diet, NPO:   Except Medications  With Ice Chips/Sips of Water (03-05-22 @ 00:42) [Active]

## 2022-03-07 NOTE — DIETITIAN INITIAL EVALUATION ADULT. - OTHER INFO
Pt lives home PTA, alert, poor historian, confused; NPO x 3 to 4d in-house, pending Swallow evaluation Pt lives home PTA, alert, poor historian, confused; NPO x 3 to 4d in-house, pending Swallow evaluation; current bedscale=73.3 lb, wt data in EMR=91 lb 3/23/21, wt loss noted ( see below)

## 2022-03-07 NOTE — SWALLOW BEDSIDE ASSESSMENT ADULT - COMMENTS
Pt A+Ox1, confused, cachectic, complete listing to left. HOB elevated to 45° as tolerated by Pt.     PATIENT RECEIVED WRITTEN TEACHING ON:   1. Signs and symptoms of stroke.  2. What to do if they are having a stroke.  3. Activation of 911.  4. Modifying risk factors.  5. Discharge medications.  6. The importance of compliance and the need for follow up. Pt was able to draw thin-puree with straw.

## 2022-03-07 NOTE — DIETITIAN INITIAL EVALUATION ADULT. - ETIOLOGY
suspect inadequate intake with chronic comorbidities, cognitive limitation; increased need for wound healing

## 2022-03-07 NOTE — SWALLOW BEDSIDE ASSESSMENT ADULT - SLP PERTINENT HISTORY OF CURRENT PROBLEM
89yoF, AAOx2-3 & ambulates w/ assistance at baseline, w/ PMH of Afib (not on AC 2/2 high risk of fall) & HTN, p/w LLE pain. Admitted for CVA. Patient's is a poor historian due to current condition. Reportedly, Patient was in a good state of health until recent fall 2 weeks ago.  On 3/3/2022, patient was noted to have a very poor response and answers questions only with 1-2 words, but was able to move all extremities. CTA N&H Severe/critical stenosis involving the supraclinoid segment of the left ICA with reconstitution of the bifurcation. CT perfusion Small volume perfusion mismatch corresponds to the corona radiata bilaterally.

## 2022-03-07 NOTE — SWALLOW BEDSIDE ASSESSMENT ADULT - ASR SWALLOW REFERRAL
Consult to dietary for f/u, Malnutrition risk present. Will likely need supplementation./Registered Dietitian/neurology/occupational therapy/physical therapy

## 2022-03-07 NOTE — DIETITIAN INITIAL EVALUATION ADULT. - PROBLEM SELECTOR PLAN 1
- p/w mental decompensation   - PE: total left-sided weakness, AAOx0, only response to pain  - CTH neg  - CTA N&H Severe/critical stenosis involving the supraclinoid segment of the left ICA with reconstitution of the bifurcation  - CT perfusion Small volume perfusion mismatch corresponds to the corona   radiata  - s/p ASA FL in ED  - c/w ASA  - neurocheck & telemonitoring  - f/u echo, cardiac enzyme, EKG    - neuro, Dr. Mcgee, consulted  - SnS consulted plan per MD

## 2022-03-07 NOTE — PROGRESS NOTE ADULT - SUBJECTIVE AND OBJECTIVE BOX
PGY-1 Progress Note discussed with attending    PAGER #: [[phone]] TILL 5:00 PM  PLEASE CONTACT ON CALL TEAM:  - On Call Team (Please refer to Dru) FROM 5:00 PM - 8:30PM  - Nightfloat Team FROM 8:30 -7:30 AM    HPI:  Patient is a 89yoF, AAOx2-3 & ambulates w/ assistance at baseline, w/ PMH of Afib (not on AC 2/2 high risk of fall) & HTN, p/w LLE pain. Patient's is a poor historian due to current condition; thus, information obtained from the son. Patient was in a good state of health until recent fall 2 weeks ago. No interval history is available as the son lives in the different state. On 3/3/2022, patient was noted to have a very poor response and answers questions only with 1-2 words, but was able to move all extremities.    In ED, patient noted to have complete left-sided weakness. She is also unable to speak, but remained agitated. (04 Mar 2022 23:54)      OVERNIGHT EVENTS:   - No acute overnight events      MEDICATIONS  (STANDING):  aspirin Suppository 300 milliGRAM(s) Rectal once  ATENolol  Tablet 50 milliGRAM(s) Oral daily  atorvastatin 80 milliGRAM(s) Oral at bedtime  enoxaparin Injectable 50 milliGRAM(s) SubCutaneous every 12 hours  lisinopril 2.5 milliGRAM(s) Oral daily    MEDICATIONS  (PRN):  midazolam Injectable 1.5 milliGRAM(s) IV Push once PRN Before MRI when transport arrives      Vital Signs Last 24 Hrs  T(C): 36.6 (07 Mar 2022 10:44), Max: 36.7 (07 Mar 2022 05:48)  T(F): 97.9 (07 Mar 2022 10:44), Max: 98.1 (07 Mar 2022 05:48)  HR: 69 (07 Mar 2022 10:44) (63 - 77)  BP: 199/79 (07 Mar 2022 10:44) (140/55 - 199/79)  BP(mean): --  RR: 17 (07 Mar 2022 10:44) (17 - 18)  SpO2: 98% (07 Mar 2022 10:44) (97% - 100%)    PHYSICAL EXAMINATION:  GENERAL:  AAOx1  HEAD: AT/NC  EYES: conjunctiva and sclera clear  NECK: supple, No JVD noted, Normal thyroid  CHEST/LUNG: CTABL; no rales, rhonchi, wheezing, or rubs  HEART: regular rate and rhythm; no murmurs, rubs, or gallops  ABDOMEN: soft, nontender, nondistended; Bowel sounds present  EXTREMITIES:  2+ Peripheral Pulses, No clubbing, cyanosis, or edema  NEURO: Patient not moving her left side, 0/5 LUE, 1/5 LLE, 5/5 RUE  RLE, Tremor in RUE and RLE  SKIN: warm dry                          12.5   9.04  )-----------( 382      ( 07 Mar 2022 04:19 )             39.5     03-07    142  |  110<H>  |  37<H>  ----------------------------<  84  3.8   |  23  |  0.71    Ca    9.8      07 Mar 2022 04:19  Phos  3.6     03-07  Mg     2.0     03-07    TPro  7.2  /  Alb  3.1<L>  /  TBili  0.5  /  DBili  x   /  AST  22  /  ALT  20  /  AlkPhos  77  03-07    LIVER FUNCTIONS - ( 07 Mar 2022 04:19 )  Alb: 3.1 g/dL / Pro: 7.2 g/dL / ALK PHOS: 77 U/L / ALT: 20 U/L DA / AST: 22 U/L / GGT: x               PTT - ( 07 Mar 2022 05:11 )  PTT:61.8 sec  COVID-19 PCR: NotDetec (04 Mar 2022 20:15)      CAPILLARY BLOOD GLUCOSE          RADIOLOGY & ADDITIONAL TESTS:    < from: CT Pelvis Bony Only No Cont (03.07.22 @ 12:51) >    IMPRESSION:    No evidence of acute fracture or dislocation. If there is persistent   concern for occult fracture further evaluation with MRI is recommended.    Indeterminate cystic structure within the left adnexa measuring up to 4.5   cm. Correlation with prior imaging, if available is recommended. In the   absence of prior imaging further evaluation with pelvic ultrasound can be   performed.    --- End of Report ---    < end of copied text >    < from: CT Perfusion w/ Maps w/ IV Cont (03.04.22 @ 22:19) >  IMPRESSION:    CT PERFUSION: No core infarct or evidence of significant ischemic   penumbra. Small volume perfusion mismatch corresponds to the corona   radiata, represents age-indeterminate white matter ischemia.    CTA BRAIN:  1. Severe/critical stenosis involving the supraclinoid segment of the   left ICA with reconstitution of the bifurcation.    CTA NECK:  1. Patent cervical vasculature. No flow limiting stenosis or occlusion.  2. Incompletely imaged pulmonary embolus in the right middle lobe    These critical results relayed to Dr. Emery Crouch at 10:35 PM.    --- End of Report ---    < end of copied text >

## 2022-03-07 NOTE — PROGRESS NOTE ADULT - PROBLEM SELECTOR PLAN 4
Pt with LLE tenderness and pain, no obvious internal/external rotation  s/p fall 2 weeks ago  CT hip negative for fracture  Knee Xray show suprapatellar effusion

## 2022-03-07 NOTE — DIETITIAN INITIAL EVALUATION ADULT. - PERTINENT LABORATORY DATA
03-07 Na142 mmol/L Glu 84 mg/dL K+ 3.8 mmol/L Cr  0.71 mg/dL BUN 37 mg/dL<H>   03-07 Phos 3.6 mg/dL   03-07 Alb 3.1 g/dL<L>       03-07 Chol 254 mg/dL<H> LDL --    HDL 86 mg/dL Trig 102 mg/dL

## 2022-03-07 NOTE — SWALLOW BEDSIDE ASSESSMENT ADULT - SWALLOW EVAL: RECOMMENDED DIET
Puree with thin liquids. Benefits from straw feeding due to posture; may want to consider thin-purees, soups, & smoothies to meet needs.

## 2022-03-07 NOTE — SWALLOW BEDSIDE ASSESSMENT ADULT - ADDITIONAL RECOMMENDATIONS
+ May consider alternative means of nutrition/hydration if in alignment with pt's/HCP's goals of care, with consideration for advanced age.

## 2022-03-07 NOTE — DIETITIAN INITIAL EVALUATION ADULT. - PROBLEM SELECTOR PLAN 3
- h/o afib, only on atenolol at home  - c/w heparin ggt  - hold atenolol for permissive HTN  - f/u EKG plan per MD

## 2022-03-07 NOTE — PROGRESS NOTE ADULT - PROBLEM SELECTOR PLAN 6
- h/o afib, only on atenolol at home  - Switch heparin to FD lovenox  - Can resume atenolol  - EKG showed narrow complex sinus tachycardia

## 2022-03-07 NOTE — ADVANCED PRACTICE NURSE CONSULT - RECOMMEDATIONS
-Clean all wounds with normal saline and apply skin prep to the surrounding skin  -Apply Bacitracin ointment to the R. Elbow wound and cover with a Foam dressing Daily PRN  -Apply Adaptic gauze to the Coccyx wound and cover with a Foam dressing Q 72hrs PRN  -Elevate/float the patients heels using heel protectors and reposition the patient Q 2hrs using wedges or pillows

## 2022-03-07 NOTE — DIETITIAN INITIAL EVALUATION ADULT. - PHYSCIAL ASSESSMENT
cachectic per MD and observed/emaciated Pressure Injury: stage I, II Pressure Injury: stage I, II  Current bedscale=73.3 lb

## 2022-03-07 NOTE — PROGRESS NOTE ADULT - ATTENDING COMMENTS
Patient is alert today.  Sons are at the bedside.  She has had 24/7 care at home.    Alert, cooperative today  Vital Signs Last 24 Hrs  T(C): 36.6 (07 Mar 2022 13:53), Max: 36.7 (07 Mar 2022 05:48)  T(F): 97.8 (07 Mar 2022 13:53), Max: 98.1 (07 Mar 2022 05:48)  HR: 70 (07 Mar 2022 13:53) (63 - 77)  BP: 168/74 (07 Mar 2022 13:53) (140/55 - 199/79)  BP(mean): --  RR: 18 (07 Mar 2022 13:53) (17 - 18)  SpO2: 97% (07 Mar 2022 13:53) (97% - 100%)  Lungs, clear  Cor, RRR  Abdomen, soft  LUE plegia  LLE externally rotated, weak  Labs, as above.   < from: CT Angio Head w/ IV Cont (03.04.22 @ 22:19) >      The area of calculated perfusion mismatch corresponds to the bilateral   corona radiata, and does not correspond to a single vascular territory.    < end of copied text >    < from: CT Angio Head w/ IV Cont (03.04.22 @ 22:19) >    CTA BRAIN:  1. Severe/critical stenosis involving the supraclinoid segment of the   left ICA with reconstitution of the bifurcation.    CTA NECK:  1. Patent cervical vasculature. No flow limiting stenosis or occlusion.  2. Incompletely imaged pulmonary embolus in the right middle lobe    < end of copied text >    < from: CT Pelvis Bony Only No Cont (03.07.22 @ 12:51) >    No evidence of acute fracture or dislocation. If there is persistent   concern for occult fracture further evaluation with MRI is recommended.    Indeterminate cystic structure within the left adnexa measuring up to 4.5   cm. Correlation with prior imaging, if available is recommended. In the   absence of prior imaging further evaluation with pelvic ultrasound can be   performed.    < end of copied text >    < from: MR Head No Cont (03.07.22 @ 19:03) >    IMPRESSION:  Small right-sided acute/subacute infarcts as described above.    Nonspecific subcentimeter focal gradient susceptibility effect with   associated high T2 FLAIR signal in the right cerebellum. Punctate   hemorrhage not excluded. Short-term follow-up exam recommended    < end of copied text >    Speech/swallow consultation, appreciated.   MRI result, reviewed  CT pelvis, result, reviewed  IMP:  CNS vascular disease is likely diffuse, as major defect does not correlated with the acute clinical finding.           LLE pain, externally rotated, r/o pelvic fracture.  X-ray is inconclusive.           Pulmonary embolism, switched to lovenox.            Warfarin recommended by Neurology because of CNS vascular finding.   Plan:  Lovenox.  Begin warfarin. Bridging can be completed at [facility].            Discharge to [facility], when arrangements can be made.

## 2022-03-07 NOTE — PROGRESS NOTE ADULT - PROBLEM SELECTOR PLAN 5
CT pelvis showed Indeterminate cystic structure within the left adnexa measuring up to 4.5 cm. Correlation with prior imaging  No previous imaging to compare with  Transvaginal US  Gyn consult

## 2022-03-07 NOTE — PROGRESS NOTE ADULT - PROBLEM SELECTOR PLAN 1
- p/w mental decompensation   - PE: total left-sided weakness, AAOx0, only response to pain  - CTH neg  - CT perfusion Small volume perfusion mismatch corresponds to the corona radiata bilaterally  - Trop 57>67.4>68.3  - Echo GIDD, mild AS  -f/u MRI  - Neuro recommends bridging to warfarin (INR goal 1-2)  -FD lovenox  - c/w ASA  - neurocheck & telemonitoring  - SnS consulted

## 2022-03-08 LAB
ANION GAP SERPL CALC-SCNC: 4 MMOL/L — LOW (ref 5–17)
BUN SERPL-MCNC: 39 MG/DL — HIGH (ref 7–18)
CALCIUM SERPL-MCNC: 9.5 MG/DL — SIGNIFICANT CHANGE UP (ref 8.4–10.5)
CHLORIDE SERPL-SCNC: 111 MMOL/L — HIGH (ref 96–108)
CO2 SERPL-SCNC: 27 MMOL/L — SIGNIFICANT CHANGE UP (ref 22–31)
CREAT SERPL-MCNC: 0.73 MG/DL — SIGNIFICANT CHANGE UP (ref 0.5–1.3)
EGFR: 78 ML/MIN/1.73M2 — SIGNIFICANT CHANGE UP
GLUCOSE SERPL-MCNC: 119 MG/DL — HIGH (ref 70–99)
HCT VFR BLD CALC: 38.1 % — SIGNIFICANT CHANGE UP (ref 34.5–45)
HGB BLD-MCNC: 12.3 G/DL — SIGNIFICANT CHANGE UP (ref 11.5–15.5)
INR BLD: 1.06 RATIO — SIGNIFICANT CHANGE UP (ref 0.88–1.16)
MCHC RBC-ENTMCNC: 29.2 PG — SIGNIFICANT CHANGE UP (ref 27–34)
MCHC RBC-ENTMCNC: 32.3 GM/DL — SIGNIFICANT CHANGE UP (ref 32–36)
MCV RBC AUTO: 90.5 FL — SIGNIFICANT CHANGE UP (ref 80–100)
NRBC # BLD: 0 /100 WBCS — SIGNIFICANT CHANGE UP (ref 0–0)
PLATELET # BLD AUTO: 337 K/UL — SIGNIFICANT CHANGE UP (ref 150–400)
POTASSIUM SERPL-MCNC: 3.8 MMOL/L — SIGNIFICANT CHANGE UP (ref 3.5–5.3)
POTASSIUM SERPL-SCNC: 3.8 MMOL/L — SIGNIFICANT CHANGE UP (ref 3.5–5.3)
PROTHROM AB SERPL-ACNC: 12.6 SEC — SIGNIFICANT CHANGE UP (ref 10.5–13.4)
RBC # BLD: 4.21 M/UL — SIGNIFICANT CHANGE UP (ref 3.8–5.2)
RBC # FLD: 13.5 % — SIGNIFICANT CHANGE UP (ref 10.3–14.5)
SODIUM SERPL-SCNC: 142 MMOL/L — SIGNIFICANT CHANGE UP (ref 135–145)
WBC # BLD: 11.76 K/UL — HIGH (ref 3.8–10.5)
WBC # FLD AUTO: 11.76 K/UL — HIGH (ref 3.8–10.5)

## 2022-03-08 PROCEDURE — 70450 CT HEAD/BRAIN W/O DYE: CPT | Mod: 26

## 2022-03-08 PROCEDURE — 99233 SBSQ HOSP IP/OBS HIGH 50: CPT | Mod: GC

## 2022-03-08 RX ORDER — SODIUM CHLORIDE 9 MG/ML
1000 INJECTION, SOLUTION INTRAVENOUS
Refills: 0 | Status: DISCONTINUED | OUTPATIENT
Start: 2022-03-08 | End: 2022-03-14

## 2022-03-08 RX ORDER — WARFARIN SODIUM 2.5 MG/1
3 TABLET ORAL ONCE
Refills: 0 | Status: COMPLETED | OUTPATIENT
Start: 2022-03-08 | End: 2022-03-08

## 2022-03-08 RX ORDER — BACITRACIN ZINC 500 UNIT/G
1 OINTMENT IN PACKET (EA) TOPICAL DAILY
Refills: 0 | Status: DISCONTINUED | OUTPATIENT
Start: 2022-03-08 | End: 2022-03-15

## 2022-03-08 RX ADMIN — LISINOPRIL 2.5 MILLIGRAM(S): 2.5 TABLET ORAL at 05:52

## 2022-03-08 RX ADMIN — SODIUM CHLORIDE 80 MILLILITER(S): 9 INJECTION, SOLUTION INTRAVENOUS at 21:03

## 2022-03-08 RX ADMIN — ATENOLOL 50 MILLIGRAM(S): 25 TABLET ORAL at 05:52

## 2022-03-08 RX ADMIN — ENOXAPARIN SODIUM 30 MILLIGRAM(S): 100 INJECTION SUBCUTANEOUS at 21:04

## 2022-03-08 RX ADMIN — ATORVASTATIN CALCIUM 80 MILLIGRAM(S): 80 TABLET, FILM COATED ORAL at 21:04

## 2022-03-08 RX ADMIN — WARFARIN SODIUM 3 MILLIGRAM(S): 2.5 TABLET ORAL at 21:04

## 2022-03-08 NOTE — PROGRESS NOTE ADULT - PROBLEM SELECTOR PLAN 1
- p/w mental decompensation   - PE: total left-sided weakness, AAOx0, only response to pain  - CTH neg  - CT perfusion Small volume perfusion mismatch corresponds to the corona radiata bilaterally  - Trop 57>67.4>68.3  - Echo GIDD, mild AS  - MRI shows small right-sided acute/subacute infarcts, nonspecific subcentimeter focal gradient susceptibility effect with associated high T2 FLAIR signal in the right cerebellum. Punctate hemorrhage not excluded. Short-term follow-up exam recommended  - Neuro recommends bridging to warfarin (INR goal 1-2)  - Received 5mg in evening  - INR 1.06 today  -FD lovenox  - c/w ASA  - neurocheck & telemonitoring  - SnS consulted

## 2022-03-08 NOTE — CHART NOTE - NSCHARTNOTEFT_GEN_A_CORE
Spoke with Son and reviewed presentation updated clinical findings and current clinical status  Reviewed overall poor prognosis long term and son very well aware of underlying dementia and poor prognosis  Reviewed Advance Directives with Son. He has two siblings; all of them consensus that given her poor quality of life would not want aggressive interventions like CPR or mechanical ventilation and hooked up to a machine   Will complete MOLST tomorrow

## 2022-03-08 NOTE — PROGRESS NOTE ADULT - SUBJECTIVE AND OBJECTIVE BOX
PGY-1 Progress Note discussed with attending    PAGER #: [[phone]] TILL 5:00 PM  PLEASE CONTACT ON CALL TEAM:  - On Call Team (Please refer to Dru) FROM 5:00 PM - 8:30PM  - Nightfloat Team FROM 8:30 -7:30 AM    HPI:  Patient is a 89yoF, AAOx2-3 & ambulates w/ assistance at baseline, w/ PMH of Afib (not on AC 2/2 high risk of fall) & HTN, p/w LLE pain. Patient's is a poor historian due to current condition; thus, information obtained from the son. Patient was in a good state of health until recent fall 2 weeks ago. No interval history is available as the son lives in the different state. On 3/3/2022, patient was noted to have a very poor response and answers questions only with 1-2 words, but was able to move all extremities.    In ED, patient noted to have complete left-sided weakness. She is also unable to speak, but remained agitated. (04 Mar 2022 23:54)    OVERNIGHT EVENTS:   - No acute events.    MEDICATIONS  (STANDING):  aspirin Suppository 300 milliGRAM(s) Rectal once  ATENolol  Tablet 50 milliGRAM(s) Oral daily  atorvastatin 80 milliGRAM(s) Oral at bedtime  dextrose 5% + sodium chloride 0.45%. 1000 milliLiter(s) (80 mL/Hr) IV Continuous <Continuous>  enoxaparin Injectable 30 milliGRAM(s) SubCutaneous every 24 hours  lisinopril 2.5 milliGRAM(s) Oral daily    MEDICATIONS  (PRN):      Vital Signs Last 24 Hrs  T(C): 36.2 (08 Mar 2022 05:25), Max: 37 (07 Mar 2022 21:22)  T(F): 97.1 (08 Mar 2022 05:25), Max: 98.6 (07 Mar 2022 21:22)  HR: 69 (08 Mar 2022 05:25) (66 - 70)  BP: 157/69 (08 Mar 2022 05:25) (157/69 - 168/74)  BP(mean): --  RR: 18 (08 Mar 2022 05:25) (18 - 18)  SpO2: 100% (08 Mar 2022 05:25) (97% - 100%)    PHYSICAL EXAMINATION:  GENERAL: Patient laying in bed being fed by PCA, does not make eye contact or speak, follow simple commands (squeeze my hand), AAOx0, appears thin and dry  HEAD: AT/NC, dry mucous membranes  EYES: conjunctiva and sclera clear  NECK: supple, No JVD noted, Normal thyroid  CHEST/LUNG: CTABL; no rales, rhonchi, wheezing, or rubs  HEART: regular rate and rhythm; no murmurs, rubs, or gallops  ABDOMEN: soft, nontender, nondistended; Bowel sounds present  EXTREMITIES:  2+ Peripheral Pulses, No clubbing, cyanosis, or edema  SKIN: warm dry                          12.3   11.76 )-----------( 337      ( 08 Mar 2022 08:36 )             38.1     03-08    142  |  111<H>  |  39<H>  ----------------------------<  119<H>  3.8   |  27  |  0.73    Ca    9.5      08 Mar 2022 08:36  Phos  3.6     03-07  Mg     2.0     03-07    TPro  7.2  /  Alb  3.1<L>  /  TBili  0.5  /  DBili  x   /  AST  22  /  ALT  20  /  AlkPhos  77  03-07    LIVER FUNCTIONS - ( 07 Mar 2022 04:19 )  Alb: 3.1 g/dL / Pro: 7.2 g/dL / ALK PHOS: 77 U/L / ALT: 20 U/L DA / AST: 22 U/L / GGT: x               PT/INR - ( 08 Mar 2022 08:36 )   PT: 12.6 sec;   INR: 1.06 ratio         PTT - ( 07 Mar 2022 05:11 )  PTT:61.8 sec  COVID-19 PCR: NotDetec (04 Mar 2022 20:15)      CAPILLARY BLOOD GLUCOSE          RADIOLOGY & ADDITIONAL TESTS:    < from: MR Head No Cont (03.07.22 @ 19:03) >    IMPRESSION:  Small right-sided acute/subacute infarcts as described above.    Nonspecific subcentimeter focal gradient susceptibility effect with   associated high T2 FLAIR signal in the right cerebellum. Punctate   hemorrhage not excluded. Short-term follow-up exam recommended    --- End of Report ---    < end of copied text >

## 2022-03-08 NOTE — PROGRESS NOTE ADULT - ATTENDING COMMENTS
Patient is drowsy and lethargic while she was more alert yesterday as per Housestaff  She has had 24/7 care at home.      Vital Signs Last 24 Hrs  T(C): 36.4 (08 Mar 2022 13:34), Max: 37 (07 Mar 2022 21:22)  T(F): 97.5 (08 Mar 2022 13:34), Max: 98.6 (07 Mar 2022 21:22)  HR: 65 (08 Mar 2022 13:34) (65 - 69)  BP: 116/51 (08 Mar 2022 13:34) (116/51 - 166/59)  RR: 18 (08 Mar 2022 13:34) (18 - 18)  SpO2: 96% (08 Mar 2022 13:34) (96% - 100%)    P/E: as above; limited exam    Labs:                        12.3   11.76 )-----------( 337      ( 08 Mar 2022 08:36 )             38.1   03-08    142  |  111<H>  |  39<H>  ----------------------------<  119<H>  3.8   |  27  |  0.73    Ca    9.5      08 Mar 2022 08:36  Phos  3.6     03-07  Mg     2.0     03-07    TPro  7.2  /  Alb  3.1<L>  /  TBili  0.5  /  DBili  x   /  AST  22  /  ALT  20  /  AlkPhos  77  03-07          CT Angio Head w/ IV Cont (03.04.22 @ 22:19) >    The area of calculated perfusion mismatch corresponds to the bilateral corona radiata, and does not correspond to a single vascular territory.      CT Angio Head w/ IV Cont (03.04.22 @ 22:19) >    CTA BRAIN:  1. Severe/critical stenosis involving the supraclinoid segment of the   left ICA with reconstitution of the bifurcation.    CTA NECK:  1. Patent cervical vasculature. No flow limiting stenosis or occlusion.  2. Incompletely imaged pulmonary embolus in the right middle lobe    < end of copied text >    < from: CT Pelvis Bony Only No Cont (03.07.22 @ 12:51) >    No evidence of acute fracture or dislocation. If there is persistent   concern for occult fracture further evaluation with MRI is recommended.    Indeterminate cystic structure within the left adnexa measuring up to 4.5   cm. Correlation with prior imaging, if available is recommended. In the   absence of prior imaging further evaluation with pelvic ultrasound can be   performed.    < end of copied text >    < from: MR Head No Cont (03.07.22 @ 19:03) >    IMPRESSION: Small right-sided acute/subacute infarcts as described above.  Nonspecific subcentimeter focal gradient susceptibility effect with associated high T2 FLAIR signal in the right cerebellum. Punctate hemorrhage not excluded. Short-term follow-up exam recommended        Speech/swallow consultation, appreciated.   MRI result, reviewed  CT pelvis, result, reviewed  IMP:  CNS vascular disease is likely diffuse, as major defect does not correlated with the acute clinical finding.           LLE pain, externally rotated, r/o pelvic fracture.  X-ray is inconclusive.           Pulmonary embolism, switched to lovenox.            Warfarin recommended by Neurology because of CNS vascular finding.       Plan:    repeat CT head no evidence of bleed or hemorrhagic transformation  Continue Lovenox.  Begin warfarin. Bridging can be completed at [facility] as planned  Spoke with Son abd updated my d/w Neuro;            Discharge to [facility], when arrangements can be made.    As per Son eventual plan is to discharge Home with resum[ption of 24 hr HHA services as before Patient seen and examined with Housestaff in the morning    Patient is drowsy and lethargic while she was more alert yesterday as per Housestaff  She has had 24/7 care at home with underlying dementia .      Vital Signs Last 24 Hrs  T(C): 36.4 (08 Mar 2022 13:34), Max: 37 (07 Mar 2022 21:22)  T(F): 97.5 (08 Mar 2022 13:34), Max: 98.6 (07 Mar 2022 21:22)  HR: 65 (08 Mar 2022 13:34) (65 - 69)  BP: 116/51 (08 Mar 2022 13:34) (116/51 - 166/59)  RR: 18 (08 Mar 2022 13:34) (18 - 18)  SpO2: 96% (08 Mar 2022 13:34) (96% - 100%)    P/E: as above; limited exam    Labs:                        12.3   11.76 )-----------( 337      ( 08 Mar 2022 08:36 )             38.1   03-08    142  |  111<H>  |  39<H>  ----------------------------<  119<H>  3.8   |  27  |  0.73    Ca    9.5      08 Mar 2022 08:36  Phos  3.6     03-07  Mg     2.0     03-07    TPro  7.2  /  Alb  3.1<L>  /  TBili  0.5  /  DBili  x   /  AST  22  /  ALT  20  /  AlkPhos  77  03-07    CT Angio Head w/ IV Cont (03.04.22 @ 22:19) >  The area of calculated perfusion mismatch corresponds to the bilateral corona radiata, and does not correspond to a single vascular territory.    CT Angio Head w/ IV Cont (03.04.22 @ 22:19) >  CTA BRAIN:  1. Severe/critical stenosis involving the supraclinoid segment of the left ICA with reconstitution of the bifurcation.    CTA NECK:  1. Patent cervical vasculature. No flow limiting stenosis or occlusion.  2. Incompletely imaged pulmonary embolus in the right middle lobe    < from: CT Pelvis Bony Only No Cont (03.07.22 @ 12:51)   No evidence of acute fracture or dislocation. If there is persistent concern for occult fracture further evaluation with MRI is recommended.  Indeterminate cystic structure within the left adnexa measuring up to 4.5 cm. Correlation with prior imaging, if available is recommended. In the   absence of prior imaging further evaluation with pelvic ultrasound can be performed.    < from: MR Head No Cont (03.07.22 @ 19:03) >  IMPRESSION: Small right-sided acute/subacute infarcts as described above.  Nonspecific subcentimeter focal gradient susceptibility effect with associated high T2 FLAIR signal in the right cerebellum. Punctate hemorrhage not excluded. Short-term follow-up exam recommended    D/D:  Acute Encephalopathy ppt by  Acute CVA  Acute PE  LLE pain, externally rotated no clear evidence of pelvic fracture    Plan:    repeat CT head no evidence of bleed or hemorrhagic transformation  Continue Lovenox.  Begin warfarin. Bridging can be completed at [facility] as planned  Given her age and functional status would be more easier to use DOACs like Apixaban as risk of bleed exist with any blood thinner, we will place on Coumadin for easy reversibility now and if tolerated well, can be transitioned to Apixaban in few weeks on discharge from [facility]; Neuro agrees  Spoke with Son abd updated my d/w Neuro; GOC discussed. DNR/ DNI confirmed; (se chart note)  Son verbalized understanding and agrees with no real benefit from pursuing the work up of adnexal cyst and even may subject patient to more harm  Son agree not a candidate for Sx intervention nor any chemotherapy  Will benefit form Palliative and comfort care if no appreciable clinical improvement in next 24 to 48 hrs  Start IV fluids as patient appears dehydrated due to poor oral intake  Discharge to [facility], when arrangements can be made and patient more stable clinically  As per Son eventual plan is to discharge Home with resumption of 24 hr HHA services as before  Rest as per PGY1 note above  Discussed with Housestaff

## 2022-03-09 LAB
INR BLD: 1.44 RATIO — HIGH (ref 0.88–1.16)
PROTHROM AB SERPL-ACNC: 17.2 SEC — HIGH (ref 10.5–13.4)

## 2022-03-09 PROCEDURE — 99233 SBSQ HOSP IP/OBS HIGH 50: CPT | Mod: GC

## 2022-03-09 PROCEDURE — 99497 ADVNCD CARE PLAN 30 MIN: CPT | Mod: GC,25

## 2022-03-09 RX ORDER — HYDRALAZINE HCL 50 MG
2.5 TABLET ORAL ONCE
Refills: 0 | Status: COMPLETED | OUTPATIENT
Start: 2022-03-09 | End: 2022-03-09

## 2022-03-09 RX ORDER — LISINOPRIL 2.5 MG/1
5 TABLET ORAL DAILY
Refills: 0 | Status: DISCONTINUED | OUTPATIENT
Start: 2022-03-09 | End: 2022-03-15

## 2022-03-09 RX ORDER — WARFARIN SODIUM 2.5 MG/1
3 TABLET ORAL ONCE
Refills: 0 | Status: COMPLETED | OUTPATIENT
Start: 2022-03-09 | End: 2022-03-09

## 2022-03-09 RX ORDER — SODIUM CHLORIDE 9 MG/ML
1000 INJECTION, SOLUTION INTRAVENOUS
Refills: 0 | Status: DISCONTINUED | OUTPATIENT
Start: 2022-03-09 | End: 2022-03-14

## 2022-03-09 RX ADMIN — Medication 1 APPLICATION(S): at 13:02

## 2022-03-09 RX ADMIN — WARFARIN SODIUM 3 MILLIGRAM(S): 2.5 TABLET ORAL at 22:49

## 2022-03-09 RX ADMIN — Medication 2.5 MILLIGRAM(S): at 06:02

## 2022-03-09 RX ADMIN — ATORVASTATIN CALCIUM 80 MILLIGRAM(S): 80 TABLET, FILM COATED ORAL at 22:49

## 2022-03-09 RX ADMIN — ENOXAPARIN SODIUM 30 MILLIGRAM(S): 100 INJECTION SUBCUTANEOUS at 22:49

## 2022-03-09 NOTE — PROGRESS NOTE ADULT - PROBLEM SELECTOR PLAN 3
- incidental finding of PE, on CTA neck  - FD lovenox, continue to warfarin bridge Goal 2-3  - INR 1.44 today

## 2022-03-09 NOTE — PROGRESS NOTE ADULT - PROBLEM SELECTOR PLAN 7
- h/o HTN not on home meds, only takes atenolol  - Start lisinopril - h/o HTN not on home meds, only takes atenolol  - Increase lisinopril to 5mg

## 2022-03-09 NOTE — PROGRESS NOTE ADULT - PROBLEM SELECTOR PLAN 2
- CTA N&H Severe/critical stenosis involving the supraclinoid segment of the left ICA with reconstitution of the bifurcation  - Does not explain symptoms  -f/u neuro recs

## 2022-03-09 NOTE — GOALS OF CARE CONVERSATION - ADVANCED CARE PLANNING - WHAT MATTERS MOST
Provide comfort and relieve any pain and minimize interventions which may be more harmful asn would not benefit

## 2022-03-09 NOTE — PROGRESS NOTE ADULT - SUBJECTIVE AND OBJECTIVE BOX
PGY-1 Progress Note discussed with attending    PAGER #: [[phone]] TILL 5:00 PM  PLEASE CONTACT ON CALL TEAM:  - On Call Team (Please refer to Dru) FROM 5:00 PM - 8:30PM  - Nightfloat Team FROM 8:30 -7:30 AM    HPI:  Patient is a 89yoF, AAOx2-3 & ambulates w/ assistance at baseline, w/ PMH of Afib (not on AC 2/2 high risk of fall) & HTN, p/w LLE pain. Patient's is a poor historian due to current condition; thus, information obtained from the son. Patient was in a good state of health until recent fall 2 weeks ago. No interval history is available as the son lives in the different state. On 3/3/2022, patient was noted to have a very poor response and answers questions only with 1-2 words, but was able to move all extremities.    In ED, patient noted to have complete left-sided weakness. She is also unable to speak, but remained agitated. (04 Mar 2022 23:54)    OVERNIGHT EVENTS:   - No acute events    MEDICATIONS  (STANDING):  aspirin Suppository 300 milliGRAM(s) Rectal once  ATENolol  Tablet 50 milliGRAM(s) Oral daily  atorvastatin 80 milliGRAM(s) Oral at bedtime  BACItracin   Ointment 1 Application(s) Topical daily  dextrose 5% + sodium chloride 0.45%. 1000 milliLiter(s) (80 mL/Hr) IV Continuous <Continuous>  enoxaparin Injectable 30 milliGRAM(s) SubCutaneous every 24 hours  lisinopril 5 milliGRAM(s) Oral daily    MEDICATIONS  (PRN):      Vital Signs Last 24 Hrs  T(C): 36.8 (09 Mar 2022 11:47), Max: 37.2 (08 Mar 2022 21:10)  T(F): 98.2 (09 Mar 2022 11:47), Max: 98.9 (08 Mar 2022 21:10)  HR: 78 (09 Mar 2022 12:26) (65 - 83)  BP: 110/53 (09 Mar 2022 11:47) (110/53 - 183/62)  BP(mean): --  RR: 18 (09 Mar 2022 11:47) (18 - 18)  SpO2: 98% (09 Mar 2022 12:26) (96% - 99%)    PHYSICAL EXAMINATION:  GENERAL: Patient laying in bed, responsive to verbal stimuli, AAOx1  HEAD: AT/NC  EYES: conjunctiva and sclera clear  NECK: supple, No JVD noted, Normal thyroid  CHEST/LUNG: CTABL; no rales, rhonchi, wheezing, or rubs  HEART: regular rate and rhythm; no murmurs, rubs, or gallops  ABDOMEN: soft, nontender, nondistended; Bowel sounds present  EXTREMITIES:  2+ Peripheral Pulses, No clubbing, cyanosis, or edema, tenderness to manipulation of LLE, appears non-erythematous, knee mildly swollen  SKIN: warm dry                          12.3   11.76 )-----------( 337      ( 08 Mar 2022 08:36 )             38.1     03-08    142  |  111<H>  |  39<H>  ----------------------------<  119<H>  3.8   |  27  |  0.73    Ca    9.5      08 Mar 2022 08:36            PT/INR - ( 09 Mar 2022 05:52 )   PT: 17.2 sec;   INR: 1.44 ratio           COVID-19 PCR: NotDetec (04 Mar 2022 20:15)      CAPILLARY BLOOD GLUCOSE          RADIOLOGY & ADDITIONAL TESTS:

## 2022-03-09 NOTE — GOALS OF CARE CONVERSATION - ADVANCED CARE PLANNING - CONVERSATION DETAILS
Spoke with Son and Reviewed overall poor prognosis long term and son very well aware of underlying dementia and poor prognosis  Reviewed Advance Directives with Son again today; Discussed yesterday as well.  He has two siblings; all of them consensus that given her poor quality of life would not want aggressive interventions like CPR or mechanical ventilation and hooked up to a machine; Family would want patient to be a fed with a feeding tube and artificial nutrition in the future. if she is unable to take     Son also wishes patient to get supportive measures like intravenous fluids, antibiotics to treat infections Spoke with Son again and reviewed overall poor prognosis long term and son very well aware of underlying dementia and poor prognosis;     Reviewed Advance Directives with Son again today; Discussed yesterday as well. He has two siblings; all of them consensus that given her poor quality of life would not want aggressive interventions like CPR or mechanical ventilation and hooked up to a machine; Son would want patient to be a fed with a feeding tube and artificial nutrition in the future, if she is unable to take oral foods especially given recent stroke. 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     Son also wishes patient to get supportive measures like intravenous fluids, antibiotics to treat infections and other treatment which may keep her comfortable.     NUNU discussed over the phone and completed with Resident snd RN witness confirming;  DNR order in [place]

## 2022-03-09 NOTE — PROGRESS NOTE ADULT - PROBLEM SELECTOR PLAN 1
- p/w mental decompensation   - PE: total left-sided weakness, AAOx0, only response to pain  - CTH neg  - CT perfusion Small volume perfusion mismatch corresponds to the corona radiata bilaterally  - Trop 57>67.4>68.3  - Echo GIDD, mild AS  - MRI shows small right-sided acute/subacute infarcts, nonspecific subcentimeter focal gradient susceptibility effect with associated high T2 FLAIR signal in the right cerebellum. Punctate hemorrhage not excluded. Short-term follow-up exam recommended  - Repeat CT 3/8 shows no bleeding  - FD lovenox, continue to warfarin bridge  - INR 1.44 today  - c/w ASA  - neurocheck & telemonitoring  - SnS consulted

## 2022-03-09 NOTE — PROGRESS NOTE ADULT - ATTENDING COMMENTS
Patient seen and examined with Housestaff in the morning    Patient is much more alert and wake today; followed simple commands. noted to have significant intermittent pain with movement of left leg which also appears to be flaccid. X-Rays and CT Pelvis with no obvious fracture  She has had 24/7 care at home with underlying dementia .      Vital Signs Last 24 Hrs  T(C): 36.7 (09 Mar 2022 13:05), Max: 37.2 (08 Mar 2022 21:10)  T(F): 98 (09 Mar 2022 13:05), Max: 98.9 (08 Mar 2022 21:10)  HR: 73 (09 Mar 2022 13:05) (65 - 83)  BP: 113/56 (09 Mar 2022 13:05) (110/53 - 183/62)  RR: 18 (09 Mar 2022 13:05) (18 - 18)  SpO2: 97% (09 Mar 2022 13:05) (97% - 99%)    P/E: as above; limited exam    Labs:                        12.3   11.76 )-----------( 337      ( 08 Mar 2022 08:36 )             38.1   03-08    142  |  111<H>  |  39<H>  ----------------------------<  119<H>  3.8   |  27  |  0.73    Ca    9.5      08 Mar 2022 08:36          CT Angio Head w/ IV Cont (03.04.22 @ 22:19) >  The area of calculated perfusion mismatch corresponds to the bilateral corona radiata, and does not correspond to a single vascular territory.    CT Angio Head w/ IV Cont (03.04.22 @ 22:19) >  CTA BRAIN:  1. Severe/critical stenosis involving the supraclinoid segment of the left ICA with reconstitution of the bifurcation.  CTA NECK:  1. Patent cervical vasculature. No flow limiting stenosis or occlusion.  2. Incompletely imaged pulmonary embolus in the right middle lobe    CT Pelvis Bony Only No Cont (03.07.22 @ 12:51)   No evidence of acute fracture or dislocation. If there is persistent concern for occult fracture further evaluation with MRI is recommended.  Indeterminate cystic structure within the left adnexa measuring up to 4.5 cm. Correlation with prior imaging, if available is recommended. In the   absence of prior imaging further evaluation with pelvic ultrasound can be performed.    MR Head No Cont (03.07.22 @ 19:03)   IMPRESSION: Small right-sided acute/subacute infarcts as described above.  Nonspecific subcentimeter focal gradient susceptibility effect with associated high T2 FLAIR signal in the right cerebellum. Punctate hemorrhage not excluded. Short-term follow-up exam recommended    D/D:  Acute Encephalopathy ppt by  Acute CVA  Acute PE  LLE pain, externally rotated no clear evidence of pelvic fracture    Plan:    repeat CT head no evidence of bleed or hemorrhagic transformation  Continue Lovenox.  Begin warfarin. Bridging can be completed at [facility] as planned  Given her age and functional status would be more easier to use DOACs like Apixaban as risk of bleed exist with any blood thinner, we will place on Coumadin for easy reversibility now and if tolerated well, can be transitioned to Apixaban in few weeks on discharge from [facility]; Neuro agrees  Spoke with Son abd updated my d/w Neuro; GOC discussed. DNR/ DNI confirmed; (se chart note)  Son verbalized understanding and agrees with no real benefit from pursuing the work up of adnexal cyst and even may subject patient to more harm  Son agree not a candidate for Sx intervention nor any chemotherapy  Will benefit form Palliative and comfort care if no appreciable clinical improvement in next 24 to 48 hrs  Start IV fluids as patient appears dehydrated due to poor oral intake  Discharge to [facility], when arrangements can be made and patient more stable clinically  As per Son eventual plan is to discharge Home with resumption of 24 hr HHA services as before  Rest as per PGY1 note above  Discussed with Housestaff Patient seen and examined with Housestaff in the morning    Patient is much more alert and wake today; followed simple commands. noted to have significant intermittent pain with movement of left leg which also appears to be flaccid. X-Rays and CT Pelvis with no obvious fracture  She has had 24/7 care at home with underlying dementia .      Vital Signs Last 24 Hrs  T(C): 36.7 (09 Mar 2022 13:05), Max: 37.2 (08 Mar 2022 21:10)  T(F): 98 (09 Mar 2022 13:05), Max: 98.9 (08 Mar 2022 21:10)  HR: 73 (09 Mar 2022 13:05) (65 - 83)  BP: 113/56 (09 Mar 2022 13:05) (110/53 - 183/62)  RR: 18 (09 Mar 2022 13:05) (18 - 18)  SpO2: 97% (09 Mar 2022 13:05) (97% - 99%)    P/E: as above; limited exam due to Dementia  elderly, cachectic female comfortably sleeping easily arousable  Psych: AAO x 1-2; Dementia   Neuro: No gross focal deficits; Left sided hemiparesis  CVS: S1S2 present, regular, no edema  Resp: BLAE+, No wheeze or Rhonchi  GI: Soft, BS+, Non tender, non distended  Extr: No  calf tenderness B/L Lower extremities but subjective pain sensation with moving extremities especially LLE  Skin: Warm and moist without any rashes      Labs:                      12.3   11.76 )-----------( 337      ( 08 Mar 2022 08:36 )             38.1   03-08    142  |  111<H>  |  39<H>  ----------------------------<  119<H>  3.8   |  27  |  0.73    Ca    9.5      08 Mar 2022 08:36    CT Angio Head w/ IV Cont (03.04.22 @ 22:19) >  The area of calculated perfusion mismatch corresponds to the bilateral corona radiata, and does not correspond to a single vascular territory.    CT Angio Head w/ IV Cont (03.04.22 @ 22:19) >  CTA BRAIN:  1. Severe/critical stenosis involving the supraclinoid segment of the left ICA with reconstitution of the bifurcation.  CTA NECK:  1. Patent cervical vasculature. No flow limiting stenosis or occlusion.  2. Incompletely imaged pulmonary embolus in the right middle lobe    CT Pelvis Bony Only No Cont (03.07.22 @ 12:51)   No evidence of acute fracture or dislocation. If there is persistent concern for occult fracture further evaluation with MRI is recommended.  Indeterminate cystic structure within the left adnexa measuring up to 4.5 cm. Correlation with prior imaging, if available is recommended. In the   absence of prior imaging further evaluation with pelvic ultrasound can be performed.    MR Head No Cont (03.07.22 @ 19:03)   IMPRESSION: Small right-sided acute/subacute infarcts as described above.  Nonspecific subcentimeter focal gradient susceptibility effect with associated high T2 FLAIR signal in the right cerebellum. Punctate hemorrhage not excluded. Short-term follow-up exam recommended    D/D:  Acute Encephalopathy ppt by  Acute CVA  Acute PE  LLE pain, externally rotated no clear evidence of pelvic fracture    Plan:    repeat CT head no evidence of bleed or hemorrhagic transformation  Continue Lovenox. with warfarin. Bridging can be completed at [facility] as planned  Given her age and functional status would be more easier to use DOACs like Apixaban as risk of bleed exist with any blood thinner, continue Coumadin for easy reversibility now and if tolerated well, can be transitioned to Apixaban in few weeks on discharge from [facility]; Neuro agrees  Son verbalized understanding and agrees with no real benefit from pursuing the work up of adnexal cyst and even may subject patient to more harm  Son agree not a candidate for Sx intervention nor any chemotherapy  Will benefit form Palliative and comfort care if no appreciable clinical improvement; some improvement today  MOLST completed and placed in chart; See GOC conversation note; DNR  Continue IV fluids as patient still appears dehydrated due to poor oral intake  Discharge to [facility], Son provided choices; [facility] or [facility]  As per Son eventual plan is to discharge Home with resumption of 24 hr HHA services as before but also has some HHA hrs through insurance 6 hrs 7 days a week; He requested increased HHA hrs;  I AGREE WITH PATIENT NEEDING INCREASED HHA HRS GIVEN ACUTE CVA AND SIGNIFICANT IMPAIRMENT IN FUNCTIONAL STATUS FORM BASELINE  Rest as per PGY1 note above  Discussed with Housestaff and RN; d/w RN Usman; PLEASE FEED WITH ASSISTANCE

## 2022-03-09 NOTE — PROGRESS NOTE ADULT - PROBLEM SELECTOR PLAN 5
CT pelvis showed Indeterminate cystic structure within the left adnexa measuring up to 4.5 cm. Correlation with prior imaging  No previous imaging to compare with  Transvaginal US  Gyn consult CT pelvis showed Indeterminate cystic structure within the left adnexa measuring up to 4.5 cm. Correlation with prior imaging  No previous imaging to compare with  Given age and overall prognosis, family decided to not purse further workup

## 2022-03-09 NOTE — PROGRESS NOTE ADULT - PROBLEM SELECTOR PLAN 6
- h/o afib, only on atenolol at home  - Switch heparin to FD lovenox  - Can resume atenolol  - EKG showed narrow complex sinus tachycardia - h/o afib, only on atenolol at home  - Switch heparin to FD lovenox  - Can resume atenolol  - EKG showed narrow complex sinus tachycardia  -

## 2022-03-10 LAB
ANION GAP SERPL CALC-SCNC: 6 MMOL/L — SIGNIFICANT CHANGE UP (ref 5–17)
BUN SERPL-MCNC: 22 MG/DL — HIGH (ref 7–18)
CALCIUM SERPL-MCNC: 8.2 MG/DL — LOW (ref 8.4–10.5)
CHLORIDE SERPL-SCNC: 109 MMOL/L — HIGH (ref 96–108)
CO2 SERPL-SCNC: 25 MMOL/L — SIGNIFICANT CHANGE UP (ref 22–31)
CREAT SERPL-MCNC: 0.56 MG/DL — SIGNIFICANT CHANGE UP (ref 0.5–1.3)
EGFR: 87 ML/MIN/1.73M2 — SIGNIFICANT CHANGE UP
GLUCOSE SERPL-MCNC: 109 MG/DL — HIGH (ref 70–99)
HCT VFR BLD CALC: 33.4 % — LOW (ref 34.5–45)
HGB BLD-MCNC: 11 G/DL — LOW (ref 11.5–15.5)
INR BLD: 2.12 RATIO — HIGH (ref 0.88–1.16)
MCHC RBC-ENTMCNC: 30.1 PG — SIGNIFICANT CHANGE UP (ref 27–34)
MCHC RBC-ENTMCNC: 32.9 GM/DL — SIGNIFICANT CHANGE UP (ref 32–36)
MCV RBC AUTO: 91.5 FL — SIGNIFICANT CHANGE UP (ref 80–100)
NRBC # BLD: 0 /100 WBCS — SIGNIFICANT CHANGE UP (ref 0–0)
PLATELET # BLD AUTO: 354 K/UL — SIGNIFICANT CHANGE UP (ref 150–400)
POTASSIUM SERPL-MCNC: 3.5 MMOL/L — SIGNIFICANT CHANGE UP (ref 3.5–5.3)
POTASSIUM SERPL-SCNC: 3.5 MMOL/L — SIGNIFICANT CHANGE UP (ref 3.5–5.3)
PROTHROM AB SERPL-ACNC: 25.4 SEC — HIGH (ref 10.5–13.4)
RBC # BLD: 3.65 M/UL — LOW (ref 3.8–5.2)
RBC # FLD: 13.8 % — SIGNIFICANT CHANGE UP (ref 10.3–14.5)
SARS-COV-2 RNA SPEC QL NAA+PROBE: SIGNIFICANT CHANGE UP
SODIUM SERPL-SCNC: 140 MMOL/L — SIGNIFICANT CHANGE UP (ref 135–145)
WBC # BLD: 12 K/UL — HIGH (ref 3.8–10.5)
WBC # FLD AUTO: 12 K/UL — HIGH (ref 3.8–10.5)

## 2022-03-10 PROCEDURE — 99233 SBSQ HOSP IP/OBS HIGH 50: CPT | Mod: GC

## 2022-03-10 RX ORDER — WARFARIN SODIUM 2.5 MG/1
1 TABLET ORAL ONCE
Refills: 0 | Status: COMPLETED | OUTPATIENT
Start: 2022-03-10 | End: 2022-03-10

## 2022-03-10 RX ORDER — APIXABAN 2.5 MG/1
1 TABLET, FILM COATED ORAL
Qty: 60 | Refills: 0
Start: 2022-03-10 | End: 2022-04-08

## 2022-03-10 RX ADMIN — WARFARIN SODIUM 1 MILLIGRAM(S): 2.5 TABLET ORAL at 22:41

## 2022-03-10 RX ADMIN — LISINOPRIL 5 MILLIGRAM(S): 2.5 TABLET ORAL at 05:41

## 2022-03-10 RX ADMIN — ATORVASTATIN CALCIUM 80 MILLIGRAM(S): 80 TABLET, FILM COATED ORAL at 22:41

## 2022-03-10 RX ADMIN — ENOXAPARIN SODIUM 30 MILLIGRAM(S): 100 INJECTION SUBCUTANEOUS at 22:40

## 2022-03-10 RX ADMIN — SODIUM CHLORIDE 80 MILLILITER(S): 9 INJECTION, SOLUTION INTRAVENOUS at 05:41

## 2022-03-10 RX ADMIN — ATENOLOL 50 MILLIGRAM(S): 25 TABLET ORAL at 05:41

## 2022-03-10 RX ADMIN — Medication 1 APPLICATION(S): at 13:00

## 2022-03-10 NOTE — PROGRESS NOTE ADULT - PROBLEM SELECTOR PLAN 6
- h/o afib, only on atenolol at home  - c/w  FD lovenox, continue warfarin bridge  - INR 2.12  - 1mg warfarin tonight  - c/w atenolol  - EKG showed narrow complex sinus tachycardia

## 2022-03-10 NOTE — PROGRESS NOTE ADULT - PROBLEM SELECTOR PLAN 3
- incidental finding of PE, on CTA neck  - FD lovenox, continue to warfarin bridge Goal 2-3  - INR 2.12 today

## 2022-03-10 NOTE — PROGRESS NOTE ADULT - PROBLEM SELECTOR PLAN 1
- p/w mental decompensation   - PE: total left-sided weakness, AAOx0, only response to pain  - CTH neg  - CT perfusion Small volume perfusion mismatch corresponds to the corona radiata bilaterally  - Trop 57>67.4>68.3  - Echo GIDD, mild AS  - MRI shows small right-sided acute/subacute infarcts, nonspecific subcentimeter focal gradient susceptibility effect with associated high T2 FLAIR signal in the right cerebellum. Punctate hemorrhage not excluded. Short-term follow-up exam recommended  - Repeat CT 3/8 shows no bleeding  - FD lovenox, continue to warfarin bridge  - INR 2.12 today  - Wafarin 1mg tonight  - c/w ASA  - neurocheck & telemonitoring  - Patient medically stable for discharge. Patient accepted at [facility], awaiting auth

## 2022-03-10 NOTE — CHART NOTE - NSCHARTNOTEFT_GEN_A_CORE
Assessment:     Factors impacting intake: [ ] none [ ] nausea  [ ] vomiting [ ] diarrhea [ ] constipation  [ ]chewing problems [ ] swallowing issues  [ ] other:     Diet Presciption: Diet, Pureed:   DASH/TLC {Sodium & Cholesterol Restricted}  Supplement Feeding Modality:  Oral  Ensure Enlive Cans or Servings Per Day:  1       Frequency:  Two Times a day (03-09-22 @ 13:55)    Intake:     Daily   % Weight Change    Pertinent Medications: MEDICATIONS  (STANDING):  aspirin Suppository 300 milliGRAM(s) Rectal once  ATENolol  Tablet 50 milliGRAM(s) Oral daily  atorvastatin 80 milliGRAM(s) Oral at bedtime  BACItracin   Ointment 1 Application(s) Topical daily  dextrose 5% + sodium chloride 0.45%. 1000 milliLiter(s) (80 mL/Hr) IV Continuous <Continuous>  dextrose 5% + sodium chloride 0.45%. 1000 milliLiter(s) (80 mL/Hr) IV Continuous <Continuous>  enoxaparin Injectable 30 milliGRAM(s) SubCutaneous every 24 hours  lisinopril 5 milliGRAM(s) Oral daily    MEDICATIONS  (PRN):    Pertinent Labs: 03-10 Na140 mmol/L Glu 109 mg/dL<H> K+ 3.5 mmol/L Cr  0.56 mg/dL BUN 22 mg/dL<H> 03-07 Phos 3.6 mg/dL 03-07 Alb 3.1 g/dL<L> 03-07 Chol 254 mg/dL<H> LDL --    HDL 86 mg/dL Trig 102 mg/dL     CAPILLARY BLOOD GLUCOSE          Skin:     Estimated Needs:   [ ] no change since previous assessment  [ ] recalculated:     Previous Nutrition Diagnosis:   [ ] Inadequate Energy Intake [ ]Inadequate Oral Intake [ ] Excessive Energy Intake   [ ] Underweight [ ] Increased Nutrient Needs [ ] Overweight/Obesity  [ ] Swallowing Difficult   [ ] Altered GI Function [ ] Unintended Weight Loss [ ] Food & Nutrition Related Knowledge Deficit [ ] Malnutrition   [ ] Not Ready for Diet/Life Style Changes     Nutrition Diagnosis is [ ] ongoing  [ ] Improving   [ ] resolved [ ] not applicable     New Nutrition Diagnosis: [ ] not applicable       Interventions:   Recommend  [ ] Change Diet To:  [ ] Nutrition Supplement  [ ] Nutrition Support  [ ] Other:     Monitoring and Evaluation:   [ ] PO intake [ x ] Tolerance to diet prescription [ x ] weights [ x ] labs[ x ] follow up per protocol  [ ] other: Assessment:     Nutrition reassessment for follow-up. Chart reviewed, pt visited, confused; s/p swallow evaluation with puree food,  thin liquid recommended 3/7/22, diet advanced, oral nutritional supplement added 3/9/22 ( diet Rx verified by MD); Pending discharge planning to skilled nursing facility rehab when medically ready     Factors impacting intake: [ X ] other: change in mental status; difficulty swallowing; acute on chronic comorbidities including acte CVA    Diet Prescription: Diet, Pureed:   DASH/TLC {Sodium & Cholesterol Restricted}  Supplement Feeding Modality:  Oral  Ensure Enlive Cans or Servings Per Day:  1       Frequency:  Two Times a day (03-09-22 @ 13:55)    Intake: poor oral intake, observed breakfast, < 25% intake today; no GI distress reported at present per RN     Daily   % Weight Change    Pertinent Medications: MEDICATIONS  (STANDING):  aspirin Suppository 300 milliGRAM(s) Rectal once  ATENolol  Tablet 50 milliGRAM(s) Oral daily  atorvastatin 80 milliGRAM(s) Oral at bedtime  BACItracin   Ointment 1 Application(s) Topical daily  dextrose 5% + sodium chloride 0.45%. 1000 milliLiter(s) (80 mL/Hr) IV Continuous <Continuous>  dextrose 5% + sodium chloride 0.45%. 1000 milliLiter(s) (80 mL/Hr) IV Continuous <Continuous>  enoxaparin Injectable 30 milliGRAM(s) SubCutaneous every 24 hours  lisinopril 5 milliGRAM(s) Oral daily    MEDICATIONS  (PRN):    Pertinent Labs: 03-10 Na140 mmol/L Glu 109 mg/dL<H> K+ 3.5 mmol/L Cr  0.56 mg/dL BUN 22 mg/dL<H> 03-07 Phos 3.6 mg/dL 03-07 Alb 3.1 g/dL<L> 03-07 Chol 254 mg/dL<H> LDL --    HDL 86 mg/dL Trig 102 mg/dL     CAPILLARY BLOOD GLUCOSE    Skin: skin wound; Pressure Injury: stage I, II     Estimated Needs:   [ X ] no change since previous assessment  [ ] recalculated:     Previous Nutrition Diagnosis:   [ X ] Malnutrition ( SEVERE )     Nutrition Diagnosis is [ X ] ongoing  [ ] Improving   [ ] resolved [ ] not applicable     New Nutrition Diagnosis: [ X ] not applicable     Interventions:   Recommend  [ X ] Continue diet Rx as ordered with Ensure Enlive  1can ( 240ml ) x bid ( 700 kcal, 40 g protein) as ordered   [ ] Nutrition Supplement  [ ] Nutrition Support  [ X  ] Other: Discussed with MD/RN                     Nursing to continue feeding assistance and encouragement, aspiration precaution                     Pt to be followed by dietitian at [facility] nursing facility when medically ready to be discharged     Monitoring and Evaluation:   [ X ] PO intake [ x ] Tolerance to diet prescription [ x ] weights [ x ] labs[ x ] follow up per protocol  [ ] other: Assessment:     Nutrition reassessment for follow-up. Chart reviewed, pt visited, confused; s/p swallow evaluation with puree food,  thin liquid recommended 3/7/22, diet advanced, oral nutritional supplement added 3/9/22 ( diet Rx verified by MD); Pending discharge planning to skilled nursing facility rehab when medically ready     Factors impacting intake: [ X ] other: change in mental status; difficulty chewing; acute on chronic comorbidities including acte CVA    Diet Prescription: Diet, Pureed:   DASH/TLC {Sodium & Cholesterol Restricted}  Supplement Feeding Modality:  Oral  Ensure Enlive Cans or Servings Per Day:  1       Frequency:  Two Times a day (03-09-22 @ 13:55)    Intake: poor oral intake, observed breakfast, < 25% intake today; no GI distress reported at present per RN     Daily   % Weight Change    Pertinent Medications: MEDICATIONS  (STANDING):  aspirin Suppository 300 milliGRAM(s) Rectal once  ATENolol  Tablet 50 milliGRAM(s) Oral daily  atorvastatin 80 milliGRAM(s) Oral at bedtime  BACItracin   Ointment 1 Application(s) Topical daily  dextrose 5% + sodium chloride 0.45%. 1000 milliLiter(s) (80 mL/Hr) IV Continuous <Continuous>  dextrose 5% + sodium chloride 0.45%. 1000 milliLiter(s) (80 mL/Hr) IV Continuous <Continuous>  enoxaparin Injectable 30 milliGRAM(s) SubCutaneous every 24 hours  lisinopril 5 milliGRAM(s) Oral daily    MEDICATIONS  (PRN):    Pertinent Labs: 03-10 Na140 mmol/L Glu 109 mg/dL<H> K+ 3.5 mmol/L Cr  0.56 mg/dL BUN 22 mg/dL<H> 03-07 Phos 3.6 mg/dL 03-07 Alb 3.1 g/dL<L> 03-07 Chol 254 mg/dL<H> LDL --    HDL 86 mg/dL Trig 102 mg/dL     CAPILLARY BLOOD GLUCOSE    Skin: skin wound; Pressure Injury: stage I, II     Estimated Needs:   [ X ] no change since previous assessment  [ ] recalculated:     Previous Nutrition Diagnosis:   [ X ] Malnutrition ( SEVERE )     Nutrition Diagnosis is [ X ] ongoing  [ ] Improving   [ ] resolved [ ] not applicable     New Nutrition Diagnosis: [ X ] not applicable     Interventions:   Recommend  [ X ] Continue diet Rx as ordered with Ensure Enlive  1can ( 240ml ) x bid ( 700 kcal, 40 g protein) as ordered   [ ] Nutrition Supplement  [ ] Nutrition Support  [ X  ] Other: Discussed with MD/RN                     Nursing to continue feeding assistance and encouragement, aspiration precaution                     Pt to be followed by dietitian at [facility] nursing facility when medically ready to be discharged     Monitoring and Evaluation:   [ X ] PO intake [ x ] Tolerance to diet prescription [ x ] weights [ x ] labs[ x ] follow up per protocol  [ ] other:

## 2022-03-10 NOTE — PROGRESS NOTE ADULT - PROBLEM SELECTOR PLAN 5
CT pelvis showed Indeterminate cystic structure within the left adnexa measuring up to 4.5 cm. Correlation with prior imaging  No previous imaging to compare with  Given age and overall prognosis, family decided to not purse further workup

## 2022-03-10 NOTE — PROGRESS NOTE ADULT - SUBJECTIVE AND OBJECTIVE BOX
PGY-1 Progress Note discussed with attending    PAGER #: [phone]] TILL 5:00 PM  PLEASE CONTACT ON CALL TEAM:  - On Call Team (Please refer to Dru) FROM 5:00 PM - 8:30PM  - Nightfloat Team FROM 8:30 -7:30 AM    HPI:  Patient is a 89yoF, AAOx2-3 & ambulates w/ assistance at baseline, w/ PMH of Afib (not on AC 2/2 high risk of fall) & HTN, p/w LLE pain. Patient's is a poor historian due to current condition; thus, information obtained from the son. Patient was in a good state of health until recent fall 2 weeks ago. No interval history is available as the son lives in the different state. On 3/3/2022, patient was noted to have a very poor response and answers questions only with 1-2 words, but was able to move all extremities.    In ED, patient noted to have complete left-sided weakness. She is also unable to speak, but remained agitated. (04 Mar 2022 23:54)    OVERNIGHT EVENTS:   - No acute events.    REVIEW OF SYSTEMS:  CONSTITUTIONAL: No fever, weight loss, or fatigue  RESPIRATORY: No cough, wheezing, chills or hemoptysis; No shortness of breath  CARDIOVASCULAR: No chest pain, palpitations, dizziness, or leg swelling  GASTROINTESTINAL: No abdominal pain. No nausea, vomiting, or hematemesis; No diarrhea or constipation. No melena or hematochezia.  GENITOURINARY: No dysuria or hematuria, urinary frequency  NEUROLOGICAL: +left sided weakness  SKIN: No itching, burning, rashes, or lesions     MEDICATIONS  (STANDING):  aspirin Suppository 300 milliGRAM(s) Rectal once  ATENolol  Tablet 50 milliGRAM(s) Oral daily  atorvastatin 80 milliGRAM(s) Oral at bedtime  BACItracin   Ointment 1 Application(s) Topical daily  dextrose 5% + sodium chloride 0.45%. 1000 milliLiter(s) (80 mL/Hr) IV Continuous <Continuous>  dextrose 5% + sodium chloride 0.45%. 1000 milliLiter(s) (80 mL/Hr) IV Continuous <Continuous>  enoxaparin Injectable 30 milliGRAM(s) SubCutaneous every 24 hours  lisinopril 5 milliGRAM(s) Oral daily  warfarin 1 milliGRAM(s) Oral once    MEDICATIONS  (PRN):      Vital Signs Last 24 Hrs  T(C): 36.2 (10 Mar 2022 05:23), Max: 36.7 (09 Mar 2022 13:05)  T(F): 97.1 (10 Mar 2022 05:23), Max: 98 (09 Mar 2022 13:05)  HR: 74 (10 Mar 2022 06:55) (67 - 78)  BP: 126/68 (10 Mar 2022 06:55) (113/56 - 174/87)  BP(mean): --  RR: 18 (10 Mar 2022 05:23) (18 - 18)  SpO2: 100% (10 Mar 2022 05:23) (97% - 100%)    PHYSICAL EXAMINATION:  GENERAL: NAD, AAOx  HEAD: AT/NC  EYES: conjunctiva and sclera clear  NECK: supple, No JVD noted, Normal thyroid  CHEST/LUNG: CTABL; no rales, rhonchi, wheezing, or rubs  HEART: regular rate and rhythm; no murmurs, rubs, or gallops  ABDOMEN: soft, nontender, nondistended; Bowel sounds present  EXTREMITIES:  2+ Peripheral Pulses, No clubbing, cyanosis, or edema  SKIN: warm dry                          11.0   12.00 )-----------( 354      ( 10 Mar 2022 07:08 )             33.4     03-10    140  |  109<H>  |  22<H>  ----------------------------<  109<H>  3.5   |  25  |  0.56    Ca    8.2<L>      10 Mar 2022 07:08            PT/INR - ( 10 Mar 2022 07:08 )   PT: 25.4 sec;   INR: 2.12 ratio           COVID-19 PCR: NotDetec (10 Mar 2022 06:03)  COVID-19 PCR: NotDetec (04 Mar 2022 20:15)      CAPILLARY BLOOD GLUCOSE          RADIOLOGY & ADDITIONAL TESTS:

## 2022-03-11 ENCOUNTER — TRANSCRIPTION ENCOUNTER (OUTPATIENT)
Age: 87
End: 2022-03-11

## 2022-03-11 LAB
ANION GAP SERPL CALC-SCNC: 4 MMOL/L — LOW (ref 5–17)
BUN SERPL-MCNC: 19 MG/DL — HIGH (ref 7–18)
CALCIUM SERPL-MCNC: 8.5 MG/DL — SIGNIFICANT CHANGE UP (ref 8.4–10.5)
CHLORIDE SERPL-SCNC: 107 MMOL/L — SIGNIFICANT CHANGE UP (ref 96–108)
CO2 SERPL-SCNC: 27 MMOL/L — SIGNIFICANT CHANGE UP (ref 22–31)
CREAT SERPL-MCNC: 0.62 MG/DL — SIGNIFICANT CHANGE UP (ref 0.5–1.3)
EGFR: 85 ML/MIN/1.73M2 — SIGNIFICANT CHANGE UP
GLUCOSE SERPL-MCNC: 107 MG/DL — HIGH (ref 70–99)
HCT VFR BLD CALC: 34 % — LOW (ref 34.5–45)
HGB BLD-MCNC: 11.4 G/DL — LOW (ref 11.5–15.5)
INR BLD: 2.35 RATIO — HIGH (ref 0.88–1.16)
MCHC RBC-ENTMCNC: 30.2 PG — SIGNIFICANT CHANGE UP (ref 27–34)
MCHC RBC-ENTMCNC: 33.5 GM/DL — SIGNIFICANT CHANGE UP (ref 32–36)
MCV RBC AUTO: 89.9 FL — SIGNIFICANT CHANGE UP (ref 80–100)
NRBC # BLD: 0 /100 WBCS — SIGNIFICANT CHANGE UP (ref 0–0)
PLATELET # BLD AUTO: 373 K/UL — SIGNIFICANT CHANGE UP (ref 150–400)
POTASSIUM SERPL-MCNC: 3.8 MMOL/L — SIGNIFICANT CHANGE UP (ref 3.5–5.3)
POTASSIUM SERPL-SCNC: 3.8 MMOL/L — SIGNIFICANT CHANGE UP (ref 3.5–5.3)
PROTHROM AB SERPL-ACNC: 28.2 SEC — HIGH (ref 10.5–13.4)
RBC # BLD: 3.78 M/UL — LOW (ref 3.8–5.2)
RBC # FLD: 13.8 % — SIGNIFICANT CHANGE UP (ref 10.3–14.5)
SODIUM SERPL-SCNC: 138 MMOL/L — SIGNIFICANT CHANGE UP (ref 135–145)
WBC # BLD: 10.57 K/UL — HIGH (ref 3.8–10.5)
WBC # FLD AUTO: 10.57 K/UL — HIGH (ref 3.8–10.5)

## 2022-03-11 PROCEDURE — 99233 SBSQ HOSP IP/OBS HIGH 50: CPT | Mod: GC

## 2022-03-11 RX ORDER — LISINOPRIL 2.5 MG/1
1 TABLET ORAL
Qty: 0 | Refills: 0 | DISCHARGE
Start: 2022-03-11

## 2022-03-11 RX ORDER — WARFARIN SODIUM 2.5 MG/1
2 TABLET ORAL ONCE
Refills: 0 | Status: COMPLETED | OUTPATIENT
Start: 2022-03-11 | End: 2022-03-11

## 2022-03-11 RX ORDER — ATORVASTATIN CALCIUM 80 MG/1
1 TABLET, FILM COATED ORAL
Qty: 0 | Refills: 0 | DISCHARGE
Start: 2022-03-11

## 2022-03-11 RX ADMIN — WARFARIN SODIUM 2 MILLIGRAM(S): 2.5 TABLET ORAL at 22:32

## 2022-03-11 RX ADMIN — LISINOPRIL 5 MILLIGRAM(S): 2.5 TABLET ORAL at 05:07

## 2022-03-11 RX ADMIN — ATORVASTATIN CALCIUM 80 MILLIGRAM(S): 80 TABLET, FILM COATED ORAL at 22:33

## 2022-03-11 RX ADMIN — ATENOLOL 50 MILLIGRAM(S): 25 TABLET ORAL at 05:07

## 2022-03-11 RX ADMIN — Medication 1 APPLICATION(S): at 11:16

## 2022-03-11 NOTE — PROGRESS NOTE ADULT - PROBLEM SELECTOR PLAN 6
- h/o afib, only on atenolol at home  - c/w  FD lovenox, continue warfarin bridge  - INR 2.35  - 2mg warfarin tonight  - c/w atenolol  - EKG showed narrow complex sinus tachycardia

## 2022-03-11 NOTE — DISCHARGE NOTE PROVIDER - NSDCQMSTROKERISK_NEU_ALL_CORE
Atrial fibrillation/Carotid stenosis/High blood pressure/High cholesterol/History of a stroke or TIA

## 2022-03-11 NOTE — DISCHARGE NOTE PROVIDER - NSDCMRMEDTOKEN_GEN_ALL_CORE_FT
atenolol 50 mg oral tablet: 1 tab(s) orally once a day  atorvastatin 80 mg oral tablet: 1 tab(s) orally once a day (at bedtime)  lisinopril 5 mg oral tablet: 1 tab(s) orally once a day  warfarin 2 mg oral tablet: 1 tab(s) orally once a day   atenolol 50 mg oral tablet: 1 tab(s) orally once a day  Lipitor 80 mg oral tablet: 1 tab(s) orally once a day  lisinopril 5 mg oral tablet: 1 tab(s) orally once a day  warfarin 3 mg oral tablet: 1 tab(s) orally once a day (at bedtime)

## 2022-03-11 NOTE — PROGRESS NOTE ADULT - ATTENDING COMMENTS
Patient seen and examined with Housestaff in the morning    Patient is more alert and wake today; followed simple commands. left leg pain has improved which also appears to be flaccid. X-Rays and CT Pelvis with no obvious fracture; She has had 24/7 care at home with underlying dementia  As per RN not eating well; I was able to feed patient with continued counseling; no dysphagia noted; RN/ PCA advised to feed slowly and be patient and with constant engagement will take feeds    Vital Signs Last 24 Hrs  T(C): 36.6 (11 Mar 2022 16:40), Max: 37.2 (10 Mar 2022 21:15)  T(F): 97.9 (11 Mar 2022 16:40), Max: 98.9 (10 Mar 2022 21:15)  HR: 83 (11 Mar 2022 16:40) (78 - 87)  BP: 143/84 (11 Mar 2022 16:40) (98/65 - 143/84)  RR: 17 (11 Mar 2022 16:40) (17 - 18)  SpO2: 96% (11 Mar 2022 16:40) (96% - 98%)    P/E: as above; limited exam due to Dementia  elderly, cachectic female comfortably sleeping easily arousable  Psych: AAO x 1-2; Dementia   Neuro: No gross focal deficits; Left sided hemiparesis  CVS: S1S2 present, regular, no edema  Resp: BLAE+, No wheeze or Rhonchi  GI: Soft, BS+, Non tender, non distended  Extr: No  calf tenderness B/L Lower extremities  Skin: Warm and moist without any rashes    Labs:                                  11.4   10.57 )-----------( 373      ( 11 Mar 2022 06:40 )             34.0   03-11    138  |  107  |  19<H>  ----------------------------<  107<H>  3.8   |  27  |  0.62    Ca    8.5      11 Mar 2022 06:40    CT Angio Head w/ IV Cont (03.04.22 @ 22:19) >  The area of calculated perfusion mismatch corresponds to the bilateral corona radiata, and does not correspond to a single vascular territory.    CT Angio Head w/ IV Cont (03.04.22 @ 22:19) >  CTA BRAIN:  1. Severe/critical stenosis involving the supraclinoid segment of the left ICA with reconstitution of the bifurcation.  CTA NECK:  1. Patent cervical vasculature. No flow limiting stenosis or occlusion.  2. Incompletely imaged pulmonary embolus in the right middle lobe    CT Pelvis Bony Only No Cont (03.07.22 @ 12:51)   No evidence of acute fracture or dislocation. If there is persistent concern for occult fracture further evaluation with MRI is recommended.  Indeterminate cystic structure within the left adnexa measuring up to 4.5 cm. Correlation with prior imaging, if available is recommended. In the   absence of prior imaging further evaluation with pelvic ultrasound can be performed.    MR Head No Cont (03.07.22 @ 19:03)   IMPRESSION: Small right-sided acute/subacute infarcts as described above.  Nonspecific subcentimeter focal gradient susceptibility effect with associated high T2 FLAIR signal in the right cerebellum. Punctate hemorrhage not excluded. 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 with Pain  Acute PE likely related to immobility  LLE pain, externally rotated no clear evidence of pelvic fracture    Plan:    Continue low dose Coumadin; INR therapeutic  Given her age and functional status would be more easier to use DOACs like Apixaban as risk of bleed exist with any blood thinner, continue Coumadin for easy reversibility now and if tolerated well, can be transitioned to Apixaban in few weeks on discharge from [facility]; Neuro agrees  Son verbalized understanding and agrees with no real benefit from pursuing the work up of adnexal cyst and even may subject patient to more harm  Son agree not a candidate for Sx intervention nor any chemotherapy  Patient clinically much btter today; was able to participate in PT;   on pressuring eats/ drinks;; drank one glass Ensure with me  MOLST in chart; DNR status;  May discontinue IV fluids AM if oral intake improved    Discharge to [facility], Son provided choices; accepted to [facility];  received Auth as per ; no female beds until Monday  As per Son eventual plan is to discharge Home with resumption of 24 hr HHA services as before but pt also has some HHA hrs through insurance 6 hrs 7 days a week; He requested increased HHA hrs;  I AGREE WITH PATIENT NEEDING INCREASED HHA HRS GIVEN ACUTE CVA AND SIGNIFICANT IMPAIRMENT IN FUNCTIONAL STATUS FROM BASELINE  Rest as per PGY1 note above    Discussed with Housestaff and RN;  PLEASE FEED WITH ASSISTANCE Patient seen and examined with Housestaff in the morning    Patient is much more alert and wake today; followed simple commands. left leg pain has improved which also appears to be flaccid. X-Rays and CT Pelvis with no obvious fracture; She has had 24/7 care at home with underlying dementia  Patient able to do more with PT today; sitting at edge of bed with assistance; gets agitated with movement of her flaccid LUE/LLE  She is able to eat and drink well with constant reinforcement; Drank a glass of Ensure I gave her    Vital Signs Last 24 Hrs  T(C): 36.6 (11 Mar 2022 16:40), Max: 37.2 (10 Mar 2022 21:15)  T(F): 97.9 (11 Mar 2022 16:40), Max: 98.9 (10 Mar 2022 21:15)  HR: 83 (11 Mar 2022 16:40) (78 - 87)  BP: 143/84 (11 Mar 2022 16:40) (98/65 - 143/84)  RR: 17 (11 Mar 2022 16:40) (17 - 18)  SpO2: 96% (11 Mar 2022 16:40) (96% - 98%)    P/E: as above; limited exam due to Dementia  elderly, cachectic female alert and awake, NAD, sitting at edge of bed with PT  Psych: AAO x 1-2; Dementia   Neuro: No gross focal deficits; Left sided hemiparesis  CVS: S1S2 present, regular, no edema  Resp: BLAE+, No wheeze or Rhonchi  GI: Soft, BS+, Non tender, non distended  Extr: No  calf tenderness B/L Lower extremities  Skin: Warm and moist without any rashes    Labs:                                11.4   10.57 )-----------( 373      ( 11 Mar 2022 06:40 )             34.0   03-11    138  |  107  |  19<H>  ----------------------------<  107<H>  3.8   |  27  |  0.62  Ca    8.5      11 Mar 2022 06:40    PT/INR - ( 11 Mar 2022 06:40 )   PT: 28.2 sec;   INR: 2.35 ratio      CT Angio Head w/ IV Cont (03.04.22 @ 22:19) >  The area of calculated perfusion mismatch corresponds to the bilateral corona radiata, and does not correspond to a single vascular territory.    CT Angio Head w/ IV Cont (03.04.22 @ 22:19) >  CTA BRAIN:  1. Severe/critical stenosis involving the supraclinoid segment of the left ICA with reconstitution of the bifurcation.  CTA NECK:  1. Patent cervical vasculature. No flow limiting stenosis or occlusion.  2. Incompletely imaged pulmonary embolus in the right middle lobe    CT Pelvis Bony Only No Cont (03.07.22 @ 12:51)   No evidence of acute fracture or dislocation. If there is persistent concern for occult fracture further evaluation with MRI is recommended.  Indeterminate cystic structure within the left adnexa measuring up to 4.5 cm. Correlation with prior imaging, if available is recommended. In the   absence of prior imaging further evaluation with pelvic ultrasound can be performed.    MR Head No Cont (03.07.22 @ 19:03)   IMPRESSION: Small right-sided acute/subacute infarcts as described above.  Nonspecific subcentimeter focal gradient susceptibility effect with associated high T2 FLAIR signal in the right cerebellum. Punctate hemorrhage not excluded. 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    Plan:    Continue low dose Coumadin; INR therapeutic; off Lovenox  Given her age and functional status would be more easier to use DOACs like Apixaban as risk of bleed exist with any blood thinner, continue Coumadin for easy reversibility now and if tolerated well, can be transitioned to Apixaban in few weeks on discharge from [facility]; Neuro agrees  Son verbalized understanding and agrees with no real benefit from pursuing the work up of adnexal cyst and even may subject patient to more harm  Son agree not a candidate for Sx intervention nor any chemotherapy  Patient clinically much btter today; was able to participate in PT; on pressuring eats/ drinks; drank one glass Ensure with me  MOLST in chart; DNR status;  May discontinue IV fluids AM if oral intake improved  Rest as per PGY1 note above    Discharge to [facility], Son provided choices; accepted to [facility];  received Auth as per ; no female beds until Monday  As per Son eventual plan is to discharge Home with resumption of 24 hr HHA services as before but pt also has some HHA hrs through insurance 6 hrs 7 days a week; He requested increased HHA hrs;  I AGREE WITH PATIENT NEEDING INCREASED HHA HRS GIVEN ACUTE CVA AND SIGNIFICANT IMPAIRMENT IN FUNCTIONAL STATUS FROM BASELINE    Discussed with Housestaff and RN;  PLEASE FEED WITH ASSISTANCE

## 2022-03-11 NOTE — DISCHARGE NOTE PROVIDER - PROVIDER TOKENS
PROVIDER:[TOKEN:[[id number]]] PROVIDER:[TOKEN:[[id number]]],PROVIDER:[TOKEN:[[id number]]]

## 2022-03-11 NOTE — DISCHARGE NOTE PROVIDER - INSTRUCTIONS
Diet, Pureed:   DASH/TLC {Sodium & Cholesterol Restricted}  Supplement Feeding Modality:  Oral  Ensure Enlive Cans or Servings Per Day:  1       Frequency:  Two Times a day (03-09-22 @ 13:55) [Active]

## 2022-03-11 NOTE — DISCHARGE NOTE PROVIDER - CARE PROVIDER_API CALL
JENNIFER OSBORNE  Internal Medicine  Phone: [phone]  Fax: ()-  Follow Up Time:    JENNIFER OSBORNE  Internal Medicine  Phone: [phone]  Fax: ()-  Follow Up Time:     Rc Kumar)  Neurology  Epilepsy  [address] [facility] [address]  [place] [address]  Phone: [phone]  Fax: ()-  Follow Up Time:

## 2022-03-11 NOTE — DISCHARGE NOTE PROVIDER - CARE PROVIDERS DIRECT ADDRESSES
,DirectAddress_Unknown ,DirectAddress_Unknown,[email or web address]

## 2022-03-11 NOTE — DISCHARGE NOTE PROVIDER - HOSPITAL COURSE
Patient is a 89yoF, AAOx2-3 & ambulates w/ assistance at baseline, w/ PMH of Afib (not on AC 2/2 high risk of fall) & HTN, p/w right sided weakness and AMS. Patient's is a poor historian due to current condition; thus, information obtained from the son. Patient was in a good state of health until recent fall 2 weeks ago. No interval history is available as the son lives in the different state. On 3/3/2022, patient was noted to have a very poor response and answers questions only with 1-2 words, but was able to move all extremities.  In ED, patient noted to have complete left-sided weakness. She was also unable to speak, but remained agitated.    Patient was admitted for stroke workup. CT head showed chronic microvascular infarcts, no acute ischemic or hemorraghic stroke. 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 MRI of the brain showed an acute/subacute infarct in the right corona radiata, and acute/subacute punctate infarct in the right frontal lobe and right posterior temporal lobe. MRI also showed possible punctate hemorrhage however follow-up imaging was negative. Neurology was consulted who recommended to start the patient on full dose anticoagulation given multiple strokes and atrial fibrillation. The decision was made to give the patient full dose lovenox and bridge her to warfarin with a goal INR of 2-3. Warfarin was chosen since it's easily reversible. Patient will not be given additional antiplatelet therapy given bleeding risk.  Patient will be discharged on 3mg of warfarin nightly and is advised to have frequent blood tests to monitor INR and adjust dose. Patient will also be given lipitor 80mg. LDL was 148    Patient was continued on her atenolol for her AF. Patient was hypertensive during this admission so we started her on lisinopril 5mg. Patient also complained of left lower extremity and left upper extremity. No fracture was found on imaging, but a small suprapatellar effusion was noted in the left knee. Pain most likely neuropathic in setting of acute stoke.    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 It was decided that given patient's age and overall prognosis that no invasive procedures should be taken. Patient not a candidate for surgical or chemotherapy treatments.    Given patient's improved clinical status and current hemodynamic stability, decision was made to discharge. Please refer to patient's complete medical chart with documents for a full hospital course, for this is only a brief summary. Patient is a 89yoF, AAOx2-3 & ambulates w/ assistance at baseline, w/ PMH of Afib (not on AC 2/2 high risk of fall) & HTN, p/w right sided weakness and AMS. Patient's is a poor historian due to current condition; thus, information obtained from the son. Patient was in a good state of health until recent fall 2 weeks ago. No interval history is available as the son lives in the different state. On 3/3/2022, patient was noted to have a very poor response and answers questions only with 1-2 words, but was able to move all extremities.  In ED, patient noted to have complete left-sided weakness. She was also unable to speak, but remained agitated.    Patient was admitted for stroke workup. CT head showed chronic microvascular infarcts, no acute ischemic or hemorraghic stroke. 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 MRI of the brain showed an acute/subacute infarct in the right corona radiata, and acute/subacute punctate infarct in the right frontal lobe and right posterior temporal lobe. MRI also showed possible punctate hemorrhage however follow-up imaging was negative. Neurology was consulted who recommended to start the patient on full dose anticoagulation given multiple strokes and atrial fibrillation. The decision was made to give the patient full dose lovenox and bridge her to warfarin with a goal INR of 2-3. Warfarin was chosen since it's easily reversible. Patient will not be given additional antiplatelet therapy given bleeding risk.  Patient will be discharged on 3mg of warfarin nightly and is advised to have frequent blood tests to monitor INR and adjust dose. Patient will also be given lipitor 80mg. LDL was 148    Patient was continued on her atenolol for her AF. Patient was hypertensive during this admission so we started her on lisinopril 5mg. Patient also complained of left lower extremity and left upper extremity. No fracture was found on imaging, but a small suprapatellar effusion was noted in the left knee. Pain most likely neuropathic in setting of acute stoke. 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 It was decided that given patient's age and overall prognosis that no invasive procedures should be taken. Patient not a candidate for surgical or chemotherapy treatments.    Given patient's improved clinical status and current hemodynamic stability, decision was made to discharge. Please refer to patient's complete medical chart with documents for a full hospital course, for this is only a brief summary. Patient is a 89yoF, AAOx2-3 & ambulates w/ assistance at baseline, w/ PMH of Afib (not on AC 2/2 high risk of fall) & HTN, p/w right sided weakness and AMS. Patient was in a good state of health until recent fall 2 weeks ago. No interval history is available as the son lives in the different state. On 3/3/2022, patient was noted to have a very poor response and answers questions only with 1-2 words, but was able to move all extremities.    Patient was admitted for stroke workup. CT head showed chronic microvascular infarcts, no acute ischemic or hemorraghic stroke. 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 MRI of the brain showed an acute/subacute infarct in the right corona radiata, and acute/subacute punctate infarct in the right frontal lobe and right posterior temporal lobe. MRI also showed possible punctate hemorrhage however follow-up imaging was negative. Neurology was consulted who recommended to start the patient on full dose anticoagulation given multiple strokes and atrial fibrillation. The decision was made to give the patient full dose lovenox and bridge her to warfarin with a goal INR of 2-3. Warfarin was chosen since it's easily reversible. Patient will not be given additional antiplatelet therapy given bleeding risk.  Patient will be discharged on 3mg of warfarin nightly and is advised to have frequent blood tests to monitor INR and adjust dose. Patient will also be given lipitor 80mg. LDL was 148    Patient was continued on her atenolol for her AF. Patient was hypertensive during this admission so we started her on lisinopril 5mg. Patient also complained of left lower extremity and left upper extremity. No fracture was found on imaging, but a small suprapatellar effusion was noted in the left knee. Pain most likely neuropathic in setting of acute stoke. 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 It was decided that given patient's age and overall prognosis that no invasive procedures should be taken. Patient not a candidate for surgical or chemotherapy treatments.    Given patient's improved clinical status and current hemodynamic stability, decision was made to discharge. Please refer to patient's complete medical chart with documents for a full hospital course, for this is only a brief summary. Patient is a 89yoF, AAOx2-3 & ambulates w/ assistance at baseline, w/ PMH of Afib (not on AC 2/2 high risk of fall) & HTN, p/w right sided weakness and AMS. Patient was in a good state of health until recent fall 2 weeks ago. No interval history is available as the son lives in the different state. On 3/3/2022, patient was noted to have a very poor response and answers questions only with 1-2 words, but was able to move all extremities.    Patient was admitted for stroke workup. CT head showed chronic microvascular infarcts, no acute ischemic or hemorraghic stroke. 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 MRI of the brain showed an acute/subacute infarct in the right corona radiata, and acute/subacute punctate infarct in the right frontal lobe and right posterior temporal lobe. MRI also showed possible punctate hemorrhage however follow-up imaging was negative. Echo showed normal systolic function, GIDD, and mild aortic stenosis. Neurology was consulted who recommended to start the patient on full dose anticoagulation given multiple strokes and atrial fibrillation. The decision was made to give the patient full dose lovenox and bridge her to warfarin with a goal INR of 2-3. Warfarin was chosen since it's easily reversible. Patient will not be given additional antiplatelet therapy given bleeding risk.  Patient will be discharged on 3mg of warfarin nightly and is advised to have frequent blood tests to monitor INR and adjust dose. Patient will also be given lipitor 80mg. LDL was 148    Patient was continued on her atenolol for her AF. Patient was hypertensive during this admission so we started her on lisinopril 5mg. Patient also complained of left lower extremity and left upper extremity. No fracture was found on imaging, but a small suprapatellar effusion was noted in the left knee. Pain most likely neuropathic in setting of acute stoke. 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 It was decided that given patient's age and overall prognosis that no invasive procedures should be taken. Patient not a candidate for surgical or chemotherapy treatments.    Given patient's improved clinical status and current hemodynamic stability, decision was made to discharge. Please refer to patient's complete medical chart with documents for a full hospital course, for this is only a brief summary.

## 2022-03-11 NOTE — DISCHARGE NOTE PROVIDER - NSDCCPCAREPLAN_GEN_ALL_CORE_FT
PRINCIPAL DISCHARGE DIAGNOSIS  Diagnosis: CVA (cerebrovascular accident)  Assessment and Plan of Treatment: You presented with right sided weakness and altered mental status. You were found to have multiple acute/subacutes strokes in the right side of your brain. Ou neurologist here recommends starting anti-coagulation with warfarin to help prevent future strokes. We started you on an antiocoagulation called lovenox while we transition you to an oral medication called Warfarin. With warfarin, you need frequent blood tests to monitor your INR levels to ensure that the medication is at the proper dose and to minimize your bleeding risk. Please continue taking 2mg nightly for now and follow-up with your doctor for INR checks and dose adjustments. IF YOU NOTICE ANY SIGN OF BLEEDING, STOP TAKING WARFARIN IMMEDIATELY AND SEEK MEDICAL ATTENTION. Please speak to your doctor at a later date about starting a different medication called Eliquis.      SECONDARY DISCHARGE DIAGNOSES  Diagnosis: Stenosis of left internal carotid artery  Assessment and Plan of Treatment: You were found to have stenosis of your left carotid artery. You are not an ideal candidate for surgical intervention. You are being treated with anticoagulation. Please continue taking 2mg of Warfarin nightly for now and follow-up with your doctor for INR checks and dose adjustments.    Diagnosis: Pulmonary embolism  Assessment and Plan of Treatment: Imaging showed that you have a pulonary embolism (a clot in your lunc), but you never required oxygen and your vitals remained stable. You are being treated with anticoagulation. Please continue taking 2mg of Warfarin nightly for now and follow-up with your doctor for INR checks and dose adjustments.    Diagnosis: HTN (hypertension)  Assessment and Plan of Treatment: You have high blood pressure but were not taking medication. We started you on a medication called lisinopril. Please continue to take 5mg daily as prescribed. It is important to continue to take your medications on time,  monitor your blood pressure at home, keep a log of your home readings and follow up with your Primary Care Physician. As per AHA/ACC guidelines, it is important to adhere to a DASH Diet of fresh fruits, vegetables, lean meats such as poultry and fish, and whole wheat carbs. 30 minutes of aerobic exercise per day 3-4 times a week, reducing salt intake <1.5g/day, and cutting down on highly processed foods are also shown to reduce BP. Uncontrolled BP may result in organ damage to your eyes, brain, heart, and kidneys by causing strokes, heart attacks, kidney failure, and bleeds in your eye.      Diagnosis: Afib  Assessment and Plan of Treatment: You have a history of atrial fibrillation. We continued your atenolol, and we also started you on an antiocoagulation to reduce your risk of another stroke.  Please continue taking 2mg of Warfarin nightly for now and follow-up with your doctor for INR checks and dose adjustments.    Diagnosis: Adnexal cyst  Assessment and Plan of Treatment: You were found to have an adnexal cyst. However, after discussion with your family we made the decision to not pursue further workup.    Diagnosis: Lower extremity pain, left  Assessment and Plan of Treatment: You were found to have pain in your left extremities. Our workup was negative for any fracture or dislocation. We did find a small joint effusion in your left knee. We believe the pain is likely a complication of your stroke.     PRINCIPAL DISCHARGE DIAGNOSIS  Diagnosis: CVA (cerebrovascular accident)  Assessment and Plan of Treatment: You presented with right sided weakness and altered mental status. You were found to have multiple acute/subacutes strokes in the right side of your brain. Ou neurologist here recommends starting anti-coagulation with warfarin to help prevent future strokes. We started you on an antiocoagulation called lovenox while we transition you to an oral medication called Warfarin. With warfarin, you need frequent blood tests to monitor your INR levels to ensure that the medication is at the proper dose and to minimize your bleeding risk. Please continue taking 2mg nightly for now and follow-up with your doctor for INR checks and dose adjustments. IF YOU NOTICE ANY SIGN OF BLEEDING, STOP TAKING WARFARIN IMMEDIATELY AND SEEK MEDICAL ATTENTION. Please speak to your doctor at a later date about starting a different medication called Eliquis. You should also follow-up with our neurologist, Dr. Kumar, in 4-6 weeks. Please his contact information below.      SECONDARY DISCHARGE DIAGNOSES  Diagnosis: Stenosis of left internal carotid artery  Assessment and Plan of Treatment: You were found to have stenosis of your left carotid artery. You are not an ideal candidate for surgical intervention. You are being treated with anticoagulation. Please continue taking 2mg of Warfarin nightly for now and follow-up with your doctor for INR checks and dose adjustments.    Diagnosis: Pulmonary embolism  Assessment and Plan of Treatment: Imaging showed that you have a pulonary embolism (a clot in your lunc), but you never required oxygen and your vitals remained stable. You are being treated with anticoagulation. Please continue taking 2mg of Warfarin nightly for now and follow-up with your doctor for INR checks and dose adjustments.    Diagnosis: HTN (hypertension)  Assessment and Plan of Treatment: You have high blood pressure but were not taking medication. We started you on a medication called lisinopril. Please continue to take 5mg daily as prescribed. It is important to continue to take your medications on time,  monitor your blood pressure at home, keep a log of your home readings and follow up with your Primary Care Physician. As per AHA/ACC guidelines, it is important to adhere to a DASH Diet of fresh fruits, vegetables, lean meats such as poultry and fish, and whole wheat carbs. 30 minutes of aerobic exercise per day 3-4 times a week, reducing salt intake <1.5g/day, and cutting down on highly processed foods are also shown to reduce BP. Uncontrolled BP may result in organ damage to your eyes, brain, heart, and kidneys by causing strokes, heart attacks, kidney failure, and bleeds in your eye.      Diagnosis: Afib  Assessment and Plan of Treatment: You have a history of atrial fibrillation. We continued your atenolol, and we also started you on an antiocoagulation to reduce your risk of another stroke.  Please continue taking 2mg of Warfarin nightly for now and follow-up with your doctor for INR checks and dose adjustments.    Diagnosis: Adnexal cyst  Assessment and Plan of Treatment: You were found to have an adnexal cyst. However, after discussion with your family we made the decision to not pursue further workup.    Diagnosis: Lower extremity pain, left  Assessment and Plan of Treatment: You were found to have pain in your left extremities. Our workup was negative for any fracture or dislocation. We did find a small joint effusion in your left knee. We believe the pain is likely a complication of your stroke.     PRINCIPAL DISCHARGE DIAGNOSIS  Diagnosis: CVA (cerebrovascular accident)  Assessment and Plan of Treatment: You presented with right sided weakness and altered mental status. You were found to have multiple acute/subacutes strokes in the right side of your brain. Our neurologist here recommends starting anti-coagulation with warfarin to help prevent future strokes. We started you on an antiocoagulation called lovenox while we transition you to an oral medication called Warfarin. With warfarin, you need frequent blood tests to monitor your INR levels to ensure that the medication is at the proper dose and to minimize your bleeding risk. Please continue taking 2mg nightly for now and follow-up with your doctor for INR checks and dose adjustments. IF YOU NOTICE ANY SIGN OF BLEEDING, STOP TAKING WARFARIN IMMEDIATELY AND SEEK MEDICAL ATTENTION. Please speak to your doctor at a later date about starting a different medication called Eliquis. You should also follow-up with our neurologist, Dr. Kumar, in 4-6 weeks. Please his contact information below.      SECONDARY DISCHARGE DIAGNOSES  Diagnosis: Stenosis of left internal carotid artery  Assessment and Plan of Treatment: You were found to have stenosis of your left carotid artery. You are not an ideal candidate for surgical intervention. You are being treated with anticoagulation. Please continue taking 2mg of Warfarin nightly for now and follow-up with your doctor for INR checks and dose adjustments.    Diagnosis: Pulmonary embolism  Assessment and Plan of Treatment: Imaging showed that you have a pulonary embolism (a clot in your lunc), but you never required oxygen and your vitals remained stable. You are being treated with anticoagulation. Please continue taking 2mg of Warfarin nightly for now and follow-up with your doctor for INR checks and dose adjustments.    Diagnosis: HTN (hypertension)  Assessment and Plan of Treatment: You have high blood pressure but were not taking medication. We started you on a medication called lisinopril. Please continue to take 5mg daily as prescribed. It is important to continue to take your medications on time,  monitor your blood pressure at home, keep a log of your home readings and follow up with your Primary Care Physician. As per AHA/ACC guidelines, it is important to adhere to a DASH Diet of fresh fruits, vegetables, lean meats such as poultry and fish, and whole wheat carbs. 30 minutes of aerobic exercise per day 3-4 times a week, reducing salt intake <1.5g/day, and cutting down on highly processed foods are also shown to reduce BP. Uncontrolled BP may result in organ damage to your eyes, brain, heart, and kidneys by causing strokes, heart attacks, kidney failure, and bleeds in your eye.      Diagnosis: Afib  Assessment and Plan of Treatment: You have a history of atrial fibrillation. We continued your atenolol, and we also started you on an antiocoagulation to reduce your risk of another stroke.  Please continue taking 2mg of Warfarin nightly for now and follow-up with your doctor for INR checks and dose adjustments.    Diagnosis: Adnexal cyst  Assessment and Plan of Treatment: You were found to have an adnexal cyst. However, after discussion with your family we made the decision to not pursue further workup.    Diagnosis: Lower extremity pain, left  Assessment and Plan of Treatment: You were found to have pain in your left extremities. Our workup was negative for any fracture or dislocation. We did find a small joint effusion in your left knee. We believe the pain is likely a complication of your stroke.     PRINCIPAL DISCHARGE DIAGNOSIS  Diagnosis: CVA (cerebrovascular accident)  Assessment and Plan of Treatment: You presented with right sided weakness and altered mental status. You were found to have multiple acute/subacutes strokes in the right side of your brain. Our neurologist here recommends starting anti-coagulation with warfarin to help prevent future strokes. We started you on an antiocoagulation called lovenox while we transition you to an oral medication called Warfarin. With warfarin, you need frequent blood tests to monitor your INR levels to ensure that the medication is at the proper dose and to minimize your bleeding risk. Please continue taking 2mg nightly for now and follow-up with your doctor for INR checks and dose adjustments. IF YOU NOTICE ANY SIGN OF BLEEDING, STOP TAKING WARFARIN IMMEDIATELY AND SEEK MEDICAL ATTENTION. Please speak to your doctor at a later date about starting a different medication called Eliquis. You should also follow-up with our neurologist, Dr. Kumar, in 4-6 weeks. Please his contact information below.      SECONDARY DISCHARGE DIAGNOSES  Diagnosis: Debility  Assessment and Plan of Treatment: Due to your stroke, you have significant weakness and debility. You were seen by our physical therapists who recommended that youa re discharged to subacute rehab. Additionally, we believe that you will likely benefit from increased HHA hours (24 hours. 7 days a week)    Diagnosis: Stenosis of left internal carotid artery  Assessment and Plan of Treatment: You were found to have stenosis of your left carotid artery. You are not an ideal candidate for surgical intervention. You are being treated with anticoagulation. Please continue taking 2mg of Warfarin nightly for now and follow-up with your doctor for INR checks and dose adjustments.    Diagnosis: Pulmonary embolism  Assessment and Plan of Treatment: Imaging showed that you have a pulonary embolism (a clot in your lunc), but you never required oxygen and your vitals remained stable. You are being treated with anticoagulation. Please continue taking 2mg of Warfarin nightly for now and follow-up with your doctor for INR checks and dose adjustments.    Diagnosis: HTN (hypertension)  Assessment and Plan of Treatment: You have high blood pressure but were not taking medication. We started you on a medication called lisinopril. Please continue to take 5mg daily as prescribed. It is important to continue to take your medications on time,  monitor your blood pressure at home, keep a log of your home readings and follow up with your Primary Care Physician. As per AHA/ACC guidelines, it is important to adhere to a DASH Diet of fresh fruits, vegetables, lean meats such as poultry and fish, and whole wheat carbs. 30 minutes of aerobic exercise per day 3-4 times a week, reducing salt intake <1.5g/day, and cutting down on highly processed foods are also shown to reduce BP. Uncontrolled BP may result in organ damage to your eyes, brain, heart, and kidneys by causing strokes, heart attacks, kidney failure, and bleeds in your eye.      Diagnosis: Afib  Assessment and Plan of Treatment: You have a history of atrial fibrillation. We continued your atenolol, and we also started you on an antiocoagulation to reduce your risk of another stroke.  Please continue taking 2mg of Warfarin nightly for now and follow-up with your doctor for INR checks and dose adjustments.    Diagnosis: Adnexal cyst  Assessment and Plan of Treatment: You were found to have an adnexal cyst. However, after discussion with your family we made the decision to not pursue further workup.    Diagnosis: Lower extremity pain, left  Assessment and Plan of Treatment: You were found to have pain in your left extremities. Our workup was negative for any fracture or dislocation. We did find a small joint effusion in your left knee. We believe the pain is likely a complication of your stroke.     PRINCIPAL DISCHARGE DIAGNOSIS  Diagnosis: CVA (cerebrovascular accident)  Assessment and Plan of Treatment: You presented with right sided weakness and altered mental status. You were found to have multiple acute/subacutes strokes in the right side of your brain. Our neurologist here recommends starting anti-coagulation with warfarin to help prevent future strokes. We started you on an antiocoagulation called lovenox while we transition you to an oral medication called Warfarin. With warfarin, you need frequent blood tests to monitor your INR levels to  ensure that the medication is at the proper dose (INR level 2-3) and to minimize your bleeding risk. Please take 4mg tonight and continue taking 3mg nightly for now and follow-up with your doctor for INR checks and dose adjustments. IF YOU NOTICE ANY SIGN OF BLEEDING, STOP TAKING WARFARIN IMMEDIATELY AND SEEK MEDICAL ATTENTION. Please speak to your doctor at a later date about starting a different medication called Eliquis when you go home from rehab. You should also follow-up with our neurologist, Dr. Kumar, in 4-6 weeks. Please his contact information below.      SECONDARY DISCHARGE DIAGNOSES  Diagnosis: Stenosis of left internal carotid artery  Assessment and Plan of Treatment: You were found to have stenosis of your left carotid artery. You are not an ideal candidate for surgical intervention. You are being treated with anticoagulation. Please take warfarin 4mg tonight and continue taking 3mg of Warfarin nightly for now and follow-up with your doctor for INR checks and dose adjustments. Please speak to your doctor at a later date about starting a different medication called Eliquis when you go home from rehab.    Diagnosis: Pulmonary embolism  Assessment and Plan of Treatment: Imaging showed that you have a pulmonary embolism (a clot in your lungs), but you never required oxygen and your vitals remained stable. You are being treated with anticoagulation. Please take warfarin 4mg tonight and continue taking 3mg of Warfarin nightly for now and follow-up with your doctor for INR checks and dose adjustments. Please speak to your doctor at a later date about starting a different medication called Eliquis when you go home from rehab.    Diagnosis: Adnexal cyst  Assessment and Plan of Treatment: You were found to have an adnexal cyst. However, after discussion with your family we made the decision to not pursue further workup.    Diagnosis: HTN (hypertension)  Assessment and Plan of Treatment: You have high blood pressure but were not taking medication. We started you on a medication called lisinopril. Please continue to take 5mg daily as prescribed. It is important to continue to take your medications on time,  monitor your blood pressure at home, keep a log of your home readings and follow up with your Primary Care Physician. As per AHA/ACC guidelines, it is important to adhere to a DASH Diet of fresh fruits, vegetables, lean meats such as poultry and fish, and whole wheat carbs. 30 minutes of aerobic exercise per day 3-4 times a week, reducing salt intake <1.5g/day, and cutting down on highly processed foods are also shown to reduce BP. Uncontrolled BP may result in organ damage to your eyes, brain, heart, and kidneys by causing strokes, heart attacks, kidney failure, and bleeds in your eye.      Diagnosis: Afib  Assessment and Plan of Treatment: You have a history of atrial fibrillation. We continued your atenolol, and we also started you on an antiocoagulation to reduce your risk of another stroke. Please take warfarin 4mg tonight and continue taking 3mg of Warfarin nightly for now and follow-up with your doctor for INR checks and dose adjustments. Please speak to your doctor at a later date about starting a different medication called Eliquis when you go home from rehab.    Diagnosis: Lower extremity pain, left  Assessment and Plan of Treatment: You were found to have pain in your left extremities. Our workup was negative for any fracture or dislocation. We did find a small joint effusion in your left knee. We believe the pain is likely a complication of your stroke.    Diagnosis: Debility  Assessment and Plan of Treatment: Due to your stroke, you have significant weakness and debility. You were seen by our physical therapists who recommended that youa re discharged to subacute rehab. Additionally, we believe that you will likely benefit from increased HHA hours (24 hours. 7 days a week)     PRINCIPAL DISCHARGE DIAGNOSIS  Diagnosis: CVA (cerebrovascular accident)  Assessment and Plan of Treatment: You presented with right sided weakness and altered mental status. You were found to have multiple acute/subacutes strokes in the right side of your brain. Our neurologist here recommends starting anti-coagulation with warfarin to help prevent future strokes. We started you on an antiocoagulation called lovenox while we transition you to an oral medication called Warfarin. With warfarin, you need frequent blood tests to monitor your INR levels to  ensure that the medication is at the proper dose (INR level 2-3) and to minimize your bleeding risk. Please take 4mg tonight and continue taking 3mg nightly for now and follow-up with your doctor for INR checks and dose adjustments. IF YOU NOTICE ANY SIGN OF BLEEDING, STOP TAKING WARFARIN IMMEDIATELY AND SEEK MEDICAL ATTENTION. Please speak to your doctor at a later date about starting a different medication called Eliquis when you go home from rehab. You should also follow-up with our neurologist, Dr. Kumar, in 4-6 weeks. Please his contact information below.  FEED WITH HEAD OF BED 45 DEGREE  ASPIRATION PRECAUTIONS      SECONDARY DISCHARGE DIAGNOSES  Diagnosis: Stenosis of left internal carotid artery  Assessment and Plan of Treatment: You were found to have stenosis of your left carotid artery. You are not an ideal candidate for surgical intervention. You are being treated with anticoagulation. Please take warfarin 4mg tonight and continue taking 3mg of Warfarin nightly for now and follow-up with your doctor for INR checks and dose adjustments. Please speak to your doctor at a later date about starting a different medication called Eliquis when you go home from rehab.    Diagnosis: Pulmonary embolism  Assessment and Plan of Treatment: Imaging showed that you have a pulmonary embolism (a clot in your lungs), but you never required oxygen and your vitals remained stable. You are being treated with anticoagulation. Please take warfarin 4mg tonight and continue taking 3mg of Warfarin nightly for now and follow-up with your doctor for INR checks and dose adjustments. Please speak to your doctor at a later date about starting a different medication called Eliquis when you go home from rehab.    Diagnosis: Adnexal cyst  Assessment and Plan of Treatment: You were found to have an adnexal cyst. However, after discussion with your family we made the decision to not pursue further workup. You would not benefit from any further work up as harm exceeds any benefits.    Diagnosis: HTN (hypertension)  Assessment and Plan of Treatment: You have high blood pressure but were not taking medication. We started you on a medication called lisinopril. Please continue to take 5mg daily as prescribed. It is important to continue to take your medications on time,  monitor your blood pressure at home, keep a log of your home readings and follow up with your Primary Care Physician. As per AHA/ACC guidelines, it is important to adhere to a DASH Diet of fresh fruits, vegetables, lean meats such as poultry and fish, and whole wheat carbs. 30 minutes of aerobic exercise per day 3-4 times a week, reducing salt intake <1.5g/day, and cutting down on highly processed foods are also shown to reduce BP. Uncontrolled BP may result in organ damage to your eyes, brain, heart, and kidneys by causing strokes, heart attacks, kidney failure, and bleeds in your eye.      Diagnosis: Afib  Assessment and Plan of Treatment: You have a history of atrial fibrillation. We continued your atenolol, and we also started you on an antiocoagulation to reduce your risk of another stroke. Please take warfarin 4mg tonight and continue taking 3mg of Warfarin nightly for now and follow-up with your doctor for INR checks and dose adjustments. Please speak to your doctor at a later date about starting a different medication called Eliquis when you go home from rehab.    Diagnosis: Lower extremity pain, left  Assessment and Plan of Treatment: You were found to have pain in your left extremities. Our workup was negative for any fracture or dislocation. We did find a small joint effusion in your left knee. We believe the pain is likely a complication of your stroke.    Diagnosis: Debility  Assessment and Plan of Treatment: Due to your stroke, you have significant weakness and debility. You were seen by our physical therapists who recommended that youa re discharged to subacute rehab. Additionally, we believe that you will likely benefit from increased HHA hours (24 hours. 7 days a week)    Diagnosis: Goals of care, counseling/discussion  Assessment and Plan of Treatment: We discussed with your family your Son Ryder about your diagnosis, prognosis and risks and benefits of aggressive intervention. Your family wishes you to be DNR and DNI; We also discussed feeding options if oral intake is poor in the future.    Diagnosis: Acute encephalopathy  Assessment and Plan of Treatment: You had acute encephalopathy due to a combination of metabolic encephalopathy from dehydration and acute stroke. Supportive care     PRINCIPAL DISCHARGE DIAGNOSIS  Diagnosis: CVA (cerebrovascular accident)  Assessment and Plan of Treatment: You presented with right sided weakness and altered mental status. You were found to have multiple acute/subacutes strokes in the right side of your brain. Our neurologist here recommends starting anti-coagulation with warfarin to help prevent future strokes. We started you on an antiocoagulation called lovenox while we transition you to an oral medication called Warfarin. With warfarin, you need frequent blood tests to monitor your INR levels to  ensure that the medication is at the proper dose (INR level 2-3) and to minimize your bleeding risk. Continue Coumadin 3mg nightly for now and follow-up with your doctor for INR checks and dose adjustments. IF YOU NOTICE ANY SIGN OF BLEEDING, STOP TAKING WARFARIN IMMEDIATELY AND SEEK MEDICAL ATTENTION. Please speak to your doctor at a later date about starting a different medication called Eliquis when you go home from rehab. for ease of administration and avoid blood work routine. You should also follow-up with our neurologist, Dr. Kumar, in 4-6 weeks. Please his contact information below.  We have discussed with MD in Rehab Dr. Judge your hospital course and follow up.   FEED WITH HEAD OF BED 45 DEGREE  ASPIRATION PRECAUTIONS      SECONDARY DISCHARGE DIAGNOSES  Diagnosis: Stenosis of left internal carotid artery  Assessment and Plan of Treatment: You were found to have stenosis of your left carotid artery. You are not an ideal candidate for surgical intervention. You are being treated with anticoagulation. Continue taking 3mg of Warfarin nightly for now and follow-up with your doctor for INR checks and dose adjustments. Please speak to your doctor at a later date about starting a different medication called Eliquis when you go home from rehab. for ease of administration and avoid blood work routine    Diagnosis: Pulmonary embolism  Assessment and Plan of Treatment: Imaging showed that you have a pulmonary embolism (a clot in your lungs), but you never required oxygen and your vitals remained stable. You are being treated with anticoagulation. Continue taking 3 mg of Warfarin nightly for now and follow-up with your doctor for INR checks and dose adjustments. Please speak to your doctor at a later date about starting a different medication called Eliquis when you go home from rehab. for ease of administration and avoid blood work routine    Diagnosis: Adnexal cyst  Assessment and Plan of Treatment: You were found to have an adnexal cyst. However, after discussion with your family we made the decision to not pursue further workup. You would not benefit from any further work up as harm exceeds any benefits.    Diagnosis: Acute encephalopathy  Assessment and Plan of Treatment: You had acute encephalopathy due to a combination of metabolic encephalopathy from dehydration and acute stroke. Supportive care,  nutritional support to prevent dehydration and reorientation to surroundings help to prevent further decline.    Diagnosis: HTN (hypertension)  Assessment and Plan of Treatment: You have high blood pressure but were not taking medication. We started you on a medication called lisinopril. Please continue to take 5mg daily as prescribed. It is important to continue to take your medications on time,  monitor your blood pressure at home, keep a log of your home readings and follow up with your Primary Care Physician.   MONITOR KIDNEY FUNCTIONS WHILE ON LISINOPRIL  As per AHA/ACC guidelines, it is important to adhere to a DASH Diet of fresh fruits, vegetables, lean meats such as poultry and fish, and whole wheat carbs. 30 minutes of aerobic exercise per day 3-4 times a week, reducing salt intake <1.5g/day, and cutting down on highly processed foods are also shown to reduce BP. Uncontrolled BP may result in organ damage to your eyes, brain, heart, and kidneys by causing strokes, heart attacks, kidney failure, and bleeds in your eye.      Diagnosis: Afib  Assessment and Plan of Treatment: You have a history of atrial fibrillation. We continued your atenolol, and we also started you on an antiocoagulation to reduce your risk of another stroke. Please take warfarin 4mg tonight and continue taking 3mg of Warfarin nightly for now and follow-up with your doctor for INR checks and dose adjustments. Please speak to your doctor at a later date about starting a different medication called Eliquis when you go home from rehab. for ease of administration and avoid blood work routine  PLEASE NOTE THAT INTERMITTENTLY YOUR HEART RATE MAY RISE; YOUR DOSE OF ATENNOLOL MAY BE ADJUSTED ONLY IF PERSISTENT RISE IN HEART RATE ONLY.    Diagnosis: Lower extremity pain, left  Assessment and Plan of Treatment: You were found to have pain in your left extremities. Our workup was negative for any fracture or dislocation. We did find a small joint effusion in your left knee. We believe the pain is likely a complication of your stroke.    Diagnosis: Debility  Assessment and Plan of Treatment: Due to your stroke, you have significant weakness and debility. You were seen by our physical therapists who recommended that youa re discharged to subacute rehab. Additionally, we believe that you will likely benefit from increased HHA hours (24 hours. 7 days a week)    Diagnosis: Goals of care, counseling/discussion  Assessment and Plan of Treatment: We discussed with your family your Son Ryder about your diagnosis, prognosis and risks and benefits of aggressive intervention. Your family wishes you to be DNR and DNI as per your wishes in the past; We also discussed feeding options if oral intake is poor in the future.  Your family may want to pursue with a feeding tube if warranted. At present your oral intake has improved after being very poor for few days after stroke. At present, you are eating better especially you are able to take Ensure well. You may be given Ensure two to three cans daily. PLEASE NOTE THAT YOU ARE ABLE TO EAT WITH CONSTANT REINFORCEMENT AND MOTIVATION; PLEASE FEED WITH ASSISATNCE; KEEP HEAD OF BED ELEVATED 30 TO 45 DEGREE WHILE FEEDING.

## 2022-03-11 NOTE — PROGRESS NOTE ADULT - PROBLEM SELECTOR PLAN 3
- incidental finding of PE, on CTA neck  - FD lovenox, continue to warfarin bridge Goal 2-3  - INR 2.35 today

## 2022-03-11 NOTE — DISCHARGE NOTE PROVIDER - ATTENDING DISCHARGE PHYSICAL EXAMINATION:
Elderly female, sleeping, easily arousable,  NAD at rest  Psych: Orientation 1-2  Neuro: Left sided hemiparesis;   CVS: S1S2 present, irregular, no edema  Resp: BLAE+, No wheeze or Rhonchi  GI: Soft, BS+, Non tender, non distended  Extr: No  calf tenderness B/L Lower extremities  Skin: Warm and moist without any rashes; appears dry

## 2022-03-11 NOTE — PROGRESS NOTE ADULT - PROBLEM SELECTOR PLAN 1
- p/w mental decompensation   - PE: total left-sided weakness, AAOx0, only response to pain  - CTH neg  - CT perfusion Small volume perfusion mismatch corresponds to the corona radiata bilaterally  - Trop 57>67.4>68.3  - Echo GIDD, mild AS  - MRI shows small right-sided acute/subacute infarcts, nonspecific subcentimeter focal gradient susceptibility effect with associated high T2 FLAIR signal in the right cerebellum. Punctate hemorrhage not excluded. Short-term follow-up exam recommended  - Repeat CT 3/8 shows no bleeding  - FD lovenox, continue to warfarin bridge  - INR 2.35 today  - Wafarin 2mg tonight  - c/w ASA  - neurocheck & telemonitoring  - Patient medically stable for discharge. Patient accepted at [facility], awaiting auth

## 2022-03-11 NOTE — PROGRESS NOTE ADULT - SUBJECTIVE AND OBJECTIVE BOX
PGY-1 Progress Note discussed with attending    PAGER #: [[phone]] TILL 5:00 PM  PLEASE CONTACT ON CALL TEAM:  - On Call Team (Please refer to Dru) FROM 5:00 PM - 8:30PM  - Nightfloat Team FROM 8:30 -7:30 AM    HPI:  Patient is a 89yoF, AAOx2-3 & ambulates w/ assistance at baseline, w/ PMH of Afib (not on AC 2/2 high risk of fall) & HTN, p/w LLE pain. Patient's is a poor historian due to current condition; thus, information obtained from the son. Patient was in a good state of health until recent fall 2 weeks ago. No interval history is available as the son lives in the different state. On 3/3/2022, patient was noted to have a very poor response and answers questions only with 1-2 words, but was able to move all extremities.    In ED, patient noted to have complete left-sided weakness. She is also unable to speak, but remained agitated. (04 Mar 2022 23:54)      OVERNIGHT EVENTS:   -   MEDICATIONS  (STANDING):  ATENolol  Tablet 50 milliGRAM(s) Oral daily  atorvastatin 80 milliGRAM(s) Oral at bedtime  BACItracin   Ointment 1 Application(s) Topical daily  dextrose 5% + sodium chloride 0.45%. 1000 milliLiter(s) (80 mL/Hr) IV Continuous <Continuous>  dextrose 5% + sodium chloride 0.45%. 1000 milliLiter(s) (80 mL/Hr) IV Continuous <Continuous>  lisinopril 5 milliGRAM(s) Oral daily    MEDICATIONS  (PRN):      Vital Signs Last 24 Hrs  T(C): 36.2 (11 Mar 2022 14:48), Max: 37.2 (10 Mar 2022 21:15)  T(F): 97.2 (11 Mar 2022 14:48), Max: 98.9 (10 Mar 2022 21:15)  HR: 82 (11 Mar 2022 15:04) (78 - 87)  BP: 102/54 (11 Mar 2022 15:04) (98/65 - 140/78)  BP(mean): --  RR: 18 (11 Mar 2022 14:48) (18 - 18)  SpO2: 97% (11 Mar 2022 14:48) (97% - 98%)    PHYSICAL EXAMINATION:  GENERAL: NAD, AAOx1  HEAD: AT/NC  EYES: conjunctiva and sclera clear  NECK: supple, No JVD noted, Normal thyroid  CHEST/LUNG: CTABL; no rales, rhonchi, wheezing, or rubs  HEART: regular rate and rhythm; no murmurs, rubs, or gallops  ABDOMEN: soft, nontender, nondistended; Bowel sounds present  EXTREMITIES:  2+ Peripheral Pulses, No clubbing, cyanosis, or edema  NEURO: Complete left side hemipalegia  SKIN: warm dry                          11.4   10.57 )-----------( 373      ( 11 Mar 2022 06:40 )             34.0     03-11    138  |  107  |  19<H>  ----------------------------<  107<H>  3.8   |  27  |  0.62    Ca    8.5      11 Mar 2022 06:40            PT/INR - ( 11 Mar 2022 06:40 )   PT: 28.2 sec;   INR: 2.35 ratio           COVID-19 PCR: NotDetec (10 Mar 2022 06:03)  COVID-19 PCR: NotDetec (04 Mar 2022 20:15)      CAPILLARY BLOOD GLUCOSE          RADIOLOGY & ADDITIONAL TESTS:

## 2022-03-11 NOTE — DISCHARGE NOTE PROVIDER - DETAILS OF MALNUTRITION DIAGNOSIS/DIAGNOSES
This patient has been assessed with a concern for Malnutrition and was treated during this hospitalization for the following Nutrition diagnosis/diagnoses:     -  03/07/2022: Severe protein-calorie malnutrition   -  03/07/2022: Underweight (BMI < 19)

## 2022-03-12 LAB
ANION GAP SERPL CALC-SCNC: 6 MMOL/L — SIGNIFICANT CHANGE UP (ref 5–17)
BUN SERPL-MCNC: 23 MG/DL — HIGH (ref 7–18)
CALCIUM SERPL-MCNC: 8.6 MG/DL — SIGNIFICANT CHANGE UP (ref 8.4–10.5)
CHLORIDE SERPL-SCNC: 110 MMOL/L — HIGH (ref 96–108)
CO2 SERPL-SCNC: 26 MMOL/L — SIGNIFICANT CHANGE UP (ref 22–31)
CREAT SERPL-MCNC: 0.62 MG/DL — SIGNIFICANT CHANGE UP (ref 0.5–1.3)
EGFR: 85 ML/MIN/1.73M2 — SIGNIFICANT CHANGE UP
GLUCOSE SERPL-MCNC: 91 MG/DL — SIGNIFICANT CHANGE UP (ref 70–99)
HCT VFR BLD CALC: 33.1 % — LOW (ref 34.5–45)
HGB BLD-MCNC: 10.9 G/DL — LOW (ref 11.5–15.5)
INR BLD: 2 RATIO — HIGH (ref 0.88–1.16)
MCHC RBC-ENTMCNC: 30 PG — SIGNIFICANT CHANGE UP (ref 27–34)
MCHC RBC-ENTMCNC: 32.9 GM/DL — SIGNIFICANT CHANGE UP (ref 32–36)
MCV RBC AUTO: 91.2 FL — SIGNIFICANT CHANGE UP (ref 80–100)
NRBC # BLD: 0 /100 WBCS — SIGNIFICANT CHANGE UP (ref 0–0)
PLATELET # BLD AUTO: 335 K/UL — SIGNIFICANT CHANGE UP (ref 150–400)
POTASSIUM SERPL-MCNC: 3.8 MMOL/L — SIGNIFICANT CHANGE UP (ref 3.5–5.3)
POTASSIUM SERPL-SCNC: 3.8 MMOL/L — SIGNIFICANT CHANGE UP (ref 3.5–5.3)
PROTHROM AB SERPL-ACNC: 24 SEC — HIGH (ref 10.5–13.4)
RBC # BLD: 3.63 M/UL — LOW (ref 3.8–5.2)
RBC # FLD: 14.2 % — SIGNIFICANT CHANGE UP (ref 10.3–14.5)
SODIUM SERPL-SCNC: 142 MMOL/L — SIGNIFICANT CHANGE UP (ref 135–145)
WBC # BLD: 10.17 K/UL — SIGNIFICANT CHANGE UP (ref 3.8–10.5)
WBC # FLD AUTO: 10.17 K/UL — SIGNIFICANT CHANGE UP (ref 3.8–10.5)

## 2022-03-12 PROCEDURE — 99232 SBSQ HOSP IP/OBS MODERATE 35: CPT

## 2022-03-12 RX ORDER — WARFARIN SODIUM 2.5 MG/1
2 TABLET ORAL AT BEDTIME
Refills: 0 | Status: DISCONTINUED | OUTPATIENT
Start: 2022-03-12 | End: 2022-03-13

## 2022-03-12 RX ADMIN — ATENOLOL 50 MILLIGRAM(S): 25 TABLET ORAL at 06:19

## 2022-03-12 RX ADMIN — ATORVASTATIN CALCIUM 80 MILLIGRAM(S): 80 TABLET, FILM COATED ORAL at 21:46

## 2022-03-12 RX ADMIN — WARFARIN SODIUM 2 MILLIGRAM(S): 2.5 TABLET ORAL at 21:46

## 2022-03-12 RX ADMIN — Medication 1 APPLICATION(S): at 13:09

## 2022-03-12 RX ADMIN — LISINOPRIL 5 MILLIGRAM(S): 2.5 TABLET ORAL at 06:20

## 2022-03-12 NOTE — PROGRESS NOTE ADULT - SUBJECTIVE AND OBJECTIVE BOX
Patient seen and examined with Housestaff in the morning    Patient is much more alert and wake today; followed simple commands. left leg pain has improved which also appears to be flaccid. X-Rays and CT Pelvis with no obvious fracture; She has had 24/7 care at home with underlying dementia  Patient able to do more with PT today; sitting at edge of bed with assistance; gets agitated with movement of her flaccid LUE/LLE  She is able to eat and drink well with constant reinforcement; Drank a glass of Ensure I gave her    Vital Signs Last 24 Hrs  T(C): 36.6 (11 Mar 2022 16:40), Max: 37.2 (10 Mar 2022 21:15)  T(F): 97.9 (11 Mar 2022 16:40), Max: 98.9 (10 Mar 2022 21:15)  HR: 83 (11 Mar 2022 16:40) (78 - 87)  BP: 143/84 (11 Mar 2022 16:40) (98/65 - 143/84)  RR: 17 (11 Mar 2022 16:40) (17 - 18)  SpO2: 96% (11 Mar 2022 16:40) (96% - 98%)    P/E: as above; limited exam due to Dementia  elderly, cachectic female alert and awake, NAD, sitting at edge of bed with PT  Psych: AAO x 1-2; Dementia   Neuro: No gross focal deficits; Left sided hemiparesis  CVS: S1S2 present, regular, no edema  Resp: BLAE+, No wheeze or Rhonchi  GI: Soft, BS+, Non tender, non distended  Extr: No  calf tenderness B/L Lower extremities  Skin: Warm and moist without any rashes    Labs:                                           10.9   10.17 )-----------( 335      ( 12 Mar 2022 06:00 )             33.1   03-12    142  |  110<H>  |  23<H>  ----------------------------<  91  3.8   |  26  |  0.62    Ca    8.6      12 Mar 2022 06:00    PT/INR - ( 12 Mar 2022 06:00 )   PT: 24.0 sec;   INR: 2.00 ratio       CT Angio Head w/ IV Cont (03.04.22 @ 22:19) >  The area of calculated perfusion mismatch corresponds to the bilateral corona radiata, and does not correspond to a single vascular territory.    CT Angio Head w/ IV Cont (03.04.22 @ 22:19) >  CTA BRAIN:  1. Severe/critical stenosis involving the supraclinoid segment of the left ICA with reconstitution of the bifurcation.  CTA NECK:  1. Patent cervical vasculature. No flow limiting stenosis or occlusion.  2. Incompletely imaged pulmonary embolus in the right middle lobe    CT Pelvis Bony Only No Cont (03.07.22 @ 12:51)   No evidence of acute fracture or dislocation. If there is persistent concern for occult fracture further evaluation with MRI is recommended.  Indeterminate cystic structure within the left adnexa measuring up to 4.5 cm. Correlation with prior imaging, if available is recommended. In the   absence of prior imaging further evaluation with pelvic ultrasound can be performed.    MR Head No Cont (03.07.22 @ 19:03)   IMPRESSION: Small right-sided acute/subacute infarcts as described above.  Nonspecific subcentimeter focal gradient susceptibility effect with associated high T2 FLAIR signal in the right cerebellum. Punctate hemorrhage not excluded. Short-term follow-up exam recommended   Patient seen and examined this morning    Patient is  alert and wake; followed simple commands. left leg pain has improved which also appears to be flaccid. X-Rays and CT Pelvis with no obvious fracture; She has had 24/7 care at home with underlying dementia  She is able to eat and drink well with constant reinforcement; Not in acute distress    MEDICATIONS  (STANDING):  ATENolol  Tablet 50 milliGRAM(s) Oral daily  atorvastatin 80 milliGRAM(s) Oral at bedtime  BACItracin   Ointment 1 Application(s) Topical daily  dextrose 5% + sodium chloride 0.45%. 1000 milliLiter(s) (80 mL/Hr) IV Continuous <Continuous>  dextrose 5% + sodium chloride 0.45%. 1000 milliLiter(s) (80 mL/Hr) IV Continuous <Continuous>  lisinopril 5 milliGRAM(s) Oral daily  warfarin 2 milliGRAM(s) Oral at bedtime    MEDICATIONS  (PRN):    Vital Signs Last 24 Hrs  T(C): 36.7 (12 Mar 2022 17:10), Max: 36.7 (12 Mar 2022 14:04)  T(F): 98.1 (12 Mar 2022 17:10), Max: 98.1 (12 Mar 2022 14:04)  HR: 77 (12 Mar 2022 17:10) (66 - 77)  BP: 145/72 (12 Mar 2022 17:10) (134/68 - 147/76)  RR: 17 (12 Mar 2022 17:10) (17 - 18)  SpO2: 98% (12 Mar 2022 17:10) (98% - 99%)    P/E: as above; limited exam due to Dementia  elderly, cachectic female alert and awake, NAD, sitting at edge of bed with PT  Psych: AAO x 1-2; Dementia   Neuro: No gross focal deficits; Left sided hemiparesis  CVS: S1S2 present, regular, no edema  Resp: BLAE+, No wheeze or Rhonchi  GI: Soft, BS+, Non tender, non distended  Extr: No  calf tenderness B/L Lower extremities  Skin: Warm and moist without any rashes    Labs:                                           10.9   10.17 )-----------( 335      ( 12 Mar 2022 06:00 )             33.1   03-12    142  |  110<H>  |  23<H>  ----------------------------<  91  3.8   |  26  |  0.62    Ca    8.6      12 Mar 2022 06:00    PT/INR - ( 12 Mar 2022 06:00 )   PT: 24.0 sec;   INR: 2.00 ratio       CT Angio Head w/ IV Cont (03.04.22 @ 22:19) >  The area of calculated perfusion mismatch corresponds to the bilateral corona radiata, and does not correspond to a single vascular territory.    CT Angio Head w/ IV Cont (03.04.22 @ 22:19) >  CTA BRAIN:  1. Severe/critical stenosis involving the supraclinoid segment of the left ICA with reconstitution of the bifurcation.  CTA NECK:  1. Patent cervical vasculature. No flow limiting stenosis or occlusion.  2. Incompletely imaged pulmonary embolus in the right middle lobe    CT Pelvis Bony Only No Cont (03.07.22 @ 12:51)   No evidence of acute fracture or dislocation. If there is persistent concern for occult fracture further evaluation with MRI is recommended.  Indeterminate cystic structure within the left adnexa measuring up to 4.5 cm. Correlation with prior imaging, if available is recommended. In the   absence of prior imaging further evaluation with pelvic ultrasound can be performed.    MR Head No Cont (03.07.22 @ 19:03)   IMPRESSION: Small right-sided acute/subacute infarcts as described above.  Nonspecific subcentimeter focal gradient susceptibility effect with associated high T2 FLAIR signal in the right cerebellum. Punctate hemorrhage not excluded. Short-term follow-up exam recommended

## 2022-03-12 NOTE — PROGRESS NOTE ADULT - ASSESSMENT
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  Severe protein calorie malnutrition    Plan:    Continue low dose Coumadin; INR therapeutic; off Lovenox  Patient eating somewhat better; D/C Fluids and monitor  Ensure BID with meals  Given her age and functional status would be more easier to use DOACs like Apixaban as risk of bleed exist with any blood thinner, continue Coumadin for easy reversibility now and if tolerated well, can be transitioned to Apixaban in few weeks on discharge from [facility]; Neuro agrees  Son verbalized understanding and agrees with no real benefit from pursuing the work up of adnexal cyst and even may subject patient to more harm  Son agree not a candidate for Sx intervention nor any chemotherapy  Patient clinically much btter today; was able to participate in PT; on pressuring eats/ drinks; drank one glass Ensure with me  NUNU in chart; DNR status;  Spoke with Son Ryder and updated    Discharge to [facility], accepted to [facility];  received Auth as per ; no female beds until Monday  As per Son eventual plan is to discharge Home with resumption of 24 hr HHA services as before but pt also has some HHA hrs through insurance 6 hrs 7 days a week; He requested increased HHA hrs;  I AGREE WITH PATIENT NEEDING INCREASED HHA HRS GIVEN ACUTE CVA AND SIGNIFICANT IMPAIRMENT IN FUNCTIONAL STATUS FROM BASELINE    DISCUSSED WITH RN/PCA;   PLEASE FEED WITH ASSISTANCE.    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  Severe protein calorie malnutrition    Plan:    Continue low dose Coumadin; INR therapeutic; off Lovenox  Patient eating somewhat better; D/C Fluids and monitor  Ensure BID with meals  Given her age and functional status would be more easier to use DOACs like Apixaban as risk of bleed exist with any blood thinner, continue Coumadin for easy reversibility now and if tolerated well, can be transitioned to Apixaban in few weeks on discharge from [facility]; Neuro agrees  Son verbalized understanding and agrees with no real benefit from pursuing the work up of adnexal cyst and even may subject patient to more harm  Son agree not a candidate for Sx intervention nor any chemotherapy  Patient clinically much btter today; was able to participate in PT; on pressuring eats/ drinks; drank one glass Ensure with me  NUNU in chart; DNR status;  Spoke with Son Ryder and updated this evening    Discharge to [facility], accepted to [facility];  received Auth as per ; no female beds until Monday  As per Son eventual plan is to discharge Home with resumption of 24 hr HHA services as before but pt also has some HHA hrs through insurance 6 hrs 7 days a week; He requested increased HHA hrs;  I AGREE WITH PATIENT NEEDING INCREASED HHA HRS GIVEN ACUTE CVA AND SIGNIFICANT IMPAIRMENT IN FUNCTIONAL STATUS FROM BASELINE    DISCUSSED WITH RN/PCA;   PLEASE FEED WITH ASSISTANCE.

## 2022-03-13 LAB
ANION GAP SERPL CALC-SCNC: 6 MMOL/L — SIGNIFICANT CHANGE UP (ref 5–17)
BUN SERPL-MCNC: 27 MG/DL — HIGH (ref 7–18)
CALCIUM SERPL-MCNC: 8.9 MG/DL — SIGNIFICANT CHANGE UP (ref 8.4–10.5)
CHLORIDE SERPL-SCNC: 109 MMOL/L — HIGH (ref 96–108)
CO2 SERPL-SCNC: 25 MMOL/L — SIGNIFICANT CHANGE UP (ref 22–31)
CREAT SERPL-MCNC: 0.55 MG/DL — SIGNIFICANT CHANGE UP (ref 0.5–1.3)
EGFR: 87 ML/MIN/1.73M2 — SIGNIFICANT CHANGE UP
GLUCOSE SERPL-MCNC: 110 MG/DL — HIGH (ref 70–99)
HCT VFR BLD CALC: 33.7 % — LOW (ref 34.5–45)
HGB BLD-MCNC: 11.3 G/DL — LOW (ref 11.5–15.5)
INR BLD: 1.82 RATIO — HIGH (ref 0.88–1.16)
MCHC RBC-ENTMCNC: 30.3 PG — SIGNIFICANT CHANGE UP (ref 27–34)
MCHC RBC-ENTMCNC: 33.5 GM/DL — SIGNIFICANT CHANGE UP (ref 32–36)
MCV RBC AUTO: 90.3 FL — SIGNIFICANT CHANGE UP (ref 80–100)
NRBC # BLD: 0 /100 WBCS — SIGNIFICANT CHANGE UP (ref 0–0)
PLATELET # BLD AUTO: 337 K/UL — SIGNIFICANT CHANGE UP (ref 150–400)
POTASSIUM SERPL-MCNC: 3.6 MMOL/L — SIGNIFICANT CHANGE UP (ref 3.5–5.3)
POTASSIUM SERPL-SCNC: 3.6 MMOL/L — SIGNIFICANT CHANGE UP (ref 3.5–5.3)
PROTHROM AB SERPL-ACNC: 21.8 SEC — HIGH (ref 10.5–13.4)
RBC # BLD: 3.73 M/UL — LOW (ref 3.8–5.2)
RBC # FLD: 14.1 % — SIGNIFICANT CHANGE UP (ref 10.3–14.5)
SODIUM SERPL-SCNC: 140 MMOL/L — SIGNIFICANT CHANGE UP (ref 135–145)
WBC # BLD: 13.55 K/UL — HIGH (ref 3.8–10.5)
WBC # FLD AUTO: 13.55 K/UL — HIGH (ref 3.8–10.5)

## 2022-03-13 PROCEDURE — 99233 SBSQ HOSP IP/OBS HIGH 50: CPT | Mod: GC

## 2022-03-13 RX ORDER — WARFARIN SODIUM 2.5 MG/1
3 TABLET ORAL ONCE
Refills: 0 | Status: COMPLETED | OUTPATIENT
Start: 2022-03-13 | End: 2022-03-13

## 2022-03-13 RX ORDER — SODIUM CHLORIDE 9 MG/ML
1000 INJECTION INTRAMUSCULAR; INTRAVENOUS; SUBCUTANEOUS
Refills: 0 | Status: DISCONTINUED | OUTPATIENT
Start: 2022-03-13 | End: 2022-03-14

## 2022-03-13 RX ADMIN — WARFARIN SODIUM 3 MILLIGRAM(S): 2.5 TABLET ORAL at 22:44

## 2022-03-13 RX ADMIN — LISINOPRIL 5 MILLIGRAM(S): 2.5 TABLET ORAL at 05:56

## 2022-03-13 RX ADMIN — SODIUM CHLORIDE 75 MILLILITER(S): 9 INJECTION INTRAMUSCULAR; INTRAVENOUS; SUBCUTANEOUS at 11:39

## 2022-03-13 RX ADMIN — ATENOLOL 50 MILLIGRAM(S): 25 TABLET ORAL at 05:56

## 2022-03-13 RX ADMIN — Medication 1 APPLICATION(S): at 11:41

## 2022-03-13 RX ADMIN — ATORVASTATIN CALCIUM 80 MILLIGRAM(S): 80 TABLET, FILM COATED ORAL at 22:44

## 2022-03-13 NOTE — PROGRESS NOTE ADULT - ATTENDING COMMENTS
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  Severe protein calorie malnutrition    Plan:    Continue low dose Coumadin; INR therapeutic; off Lovenox  Patient eating somewhat better; D/C Fluids and monitor  Ensure BID with meals  Given her age and functional status would be more easier to use DOACs like Apixaban as risk of bleed exist with any blood thinner, continue Coumadin for easy reversibility now and if tolerated well, can be transitioned to Apixaban in few weeks on discharge from [facility] Neuro agrees  Son verbalized understanding and agrees with no real benefit from pursuing the work up of adnexal cyst and even may subject patient to more harm  Son agree not a candidate for Sx intervention nor any chemotherapy  Patient clinically much btter today; was able to participate in PT; on pressuring eats/ drinks; drank one glass Ensure with me  NUNU in chart; DNR status;  Spoke with Son Ryder and updated this evening    Discharge to [facility] accepted to [facility];  received Auth; no female beds until Monday; d/w ; needs a unvaccinated bed  As per Son eventual plan is to discharge Home with resumption of 24 hr HHA services as before but pt also has some HHA hrs through insurance 6 hrs 7 days a week; He requested increased HHA hrs;  I AGREE WITH PATIENT NEEDING INCREASED HHA HRS GIVEN ACUTE CVA AND SIGNIFICANT IMPAIRMENT IN FUNCTIONAL STATUS FROM BASELINE    DISCUSSED WITH RN/PCA;   PLEASE FEED WITH ASSISTANCE.

## 2022-03-13 NOTE — PROGRESS NOTE ADULT - PROBLEM SELECTOR PLAN 6
- h/o afib, only on atenolol at home  - c/w  FD lovenox, continue warfarin bridge  - INR 1.82  - 2mg warfarin tonight  - c/w atenolol  - EKG showed narrow complex sinus tachycardia

## 2022-03-13 NOTE — PROGRESS NOTE ADULT - SUBJECTIVE AND OBJECTIVE BOX
PGY-1 Progress Note discussed with attending    PAGER #: [phone]] TILL 5:00 PM  PLEASE CONTACT ON CALL TEAM:  - On Call Team (Please refer to Dru) FROM 5:00 PM - 8:30PM  - Nightfloat Team FROM 8:30 -7:30 AM    HPI:  Patient is a 89yoF, AAOx2-3 & ambulates w/ assistance at baseline, w/ PMH of Afib (not on AC 2/2 high risk of fall) & HTN, p/w LLE pain. Patient's is a poor historian due to current condition; thus, information obtained from the son. Patient was in a good state of health until recent fall 2 weeks ago. No interval history is available as the son lives in the different state. On 3/3/2022, patient was noted to have a very poor response and answers questions only with 1-2 words, but was able to move all extremities.    In ED, patient noted to have complete left-sided weakness. She is also unable to speak, but remained agitated. (04 Mar 2022 23:54)      OVERNIGHT EVENTS:   - No acute events. Patient seen at bedside. Appears clinically improved, patient sitting in bed, eating with assistance    REVIEW OF SYSTEMS:  CONSTITUTIONAL: No fever, weight loss, or fatigue  RESPIRATORY: No cough, wheezing, chills or hemoptysis; No shortness of breath  CARDIOVASCULAR: No chest pain, palpitations, dizziness, or leg swelling  GASTROINTESTINAL: No abdominal pain. No nausea, vomiting, or hematemesis; No diarrhea or constipation. No melena or hematochezia.  GENITOURINARY: No dysuria or hematuria, urinary frequency  NEUROLOGICAL: No headaches, memory loss, loss of strength, numbness, or tremors  SKIN: No itching, burning, rashes, or lesions     MEDICATIONS  (STANDING):  ATENolol  Tablet 50 milliGRAM(s) Oral daily  atorvastatin 80 milliGRAM(s) Oral at bedtime  BACItracin   Ointment 1 Application(s) Topical daily  dextrose 5% + sodium chloride 0.45%. 1000 milliLiter(s) (80 mL/Hr) IV Continuous <Continuous>  dextrose 5% + sodium chloride 0.45%. 1000 milliLiter(s) (80 mL/Hr) IV Continuous <Continuous>  lisinopril 5 milliGRAM(s) Oral daily  sodium chloride 0.9%. 1000 milliLiter(s) (75 mL/Hr) IV Continuous <Continuous>  warfarin 3 milliGRAM(s) Oral once    MEDICATIONS  (PRN):      Vital Signs Last 24 Hrs  T(C): 36.8 (13 Mar 2022 04:50), Max: 37.1 (12 Mar 2022 21:03)  T(F): 98.2 (13 Mar 2022 04:50), Max: 98.8 (12 Mar 2022 21:03)  HR: 89 (13 Mar 2022 04:50) (66 - 89)  BP: 140/74 (13 Mar 2022 04:50) (140/74 - 148/62)  BP(mean): --  RR: 18 (13 Mar 2022 04:50) (17 - 18)  SpO2: 95% (13 Mar 2022 04:50) (95% - 98%)    PHYSICAL EXAMINATION:  GENERAL: NAD, AAOx1  HEAD: AT/NC  EYES: conjunctiva and sclera clear  NECK: supple, No JVD noted, Normal thyroid  CHEST/LUNG: CTABL; no rales, rhonchi, wheezing, or rubs  HEART: regular rate and rhythm; no murmurs, rubs, or gallops  ABDOMEN: soft, nontender, nondistended; Bowel sounds present  EXTREMITIES:  2+ Peripheral Pulses, No clubbing, cyanosis, or edema  NEUROLOGY: Complete left sided hemiplegia   SKIN: warm dry                          11.3   13.55 )-----------( 337      ( 13 Mar 2022 06:49 )             33.7     03-13    140  |  109<H>  |  27<H>  ----------------------------<  110<H>  3.6   |  25  |  0.55    Ca    8.9      13 Mar 2022 06:49            PT/INR - ( 13 Mar 2022 06:49 )   PT: 21.8 sec;   INR: 1.82 ratio           COVID-19 PCR: NotDetec (10 Mar 2022 06:03)  COVID-19 PCR: NotDetec (04 Mar 2022 20:15)      CAPILLARY BLOOD GLUCOSE          RADIOLOGY & ADDITIONAL TESTS:

## 2022-03-13 NOTE — PROGRESS NOTE ADULT - PROBLEM SELECTOR PLAN 1
- p/w mental decompensation   - PE: total left-sided weakness, AAOx0, only response to pain  - CTH neg  - CT perfusion Small volume perfusion mismatch corresponds to the corona radiata bilaterally  - Trop 57>67.4>68.3  - Echo GIDD, mild AS  - MRI shows small right-sided acute/subacute infarcts, nonspecific subcentimeter focal gradient susceptibility effect with associated high T2 FLAIR signal in the right cerebellum. Punctate hemorrhage not excluded. Short-term follow-up exam recommended  - Repeat CT 3/8 shows no bleeding  - FD lovenox, continue to warfarin bridge  - INR 1.82 today  - Last 2 doses were 2mg  - Wafarin 3mg tonight  - neurocheck & telemonitoring  - Patient medically stable for discharge. Patient accepted at [facility], awaiting auth

## 2022-03-13 NOTE — PROGRESS NOTE ADULT - PROBLEM SELECTOR PLAN 3
- incidental finding of PE, on CTA neck  - FD lovenox, continue to warfarin bridge Goal 2-3  - INR 1.82 today

## 2022-03-14 ENCOUNTER — TRANSCRIPTION ENCOUNTER (OUTPATIENT)
Age: 87
End: 2022-03-14

## 2022-03-14 LAB
ANION GAP SERPL CALC-SCNC: 4 MMOL/L — LOW (ref 5–17)
BUN SERPL-MCNC: 26 MG/DL — HIGH (ref 7–18)
CALCIUM SERPL-MCNC: 8.5 MG/DL — SIGNIFICANT CHANGE UP (ref 8.4–10.5)
CHLORIDE SERPL-SCNC: 109 MMOL/L — HIGH (ref 96–108)
CO2 SERPL-SCNC: 28 MMOL/L — SIGNIFICANT CHANGE UP (ref 22–31)
CREAT SERPL-MCNC: 0.52 MG/DL — SIGNIFICANT CHANGE UP (ref 0.5–1.3)
EGFR: 88 ML/MIN/1.73M2 — SIGNIFICANT CHANGE UP
GLUCOSE SERPL-MCNC: 101 MG/DL — HIGH (ref 70–99)
HCT VFR BLD CALC: 33.2 % — LOW (ref 34.5–45)
HGB BLD-MCNC: 10.6 G/DL — LOW (ref 11.5–15.5)
INR BLD: 1.27 RATIO — HIGH (ref 0.88–1.16)
MCHC RBC-ENTMCNC: 29.4 PG — SIGNIFICANT CHANGE UP (ref 27–34)
MCHC RBC-ENTMCNC: 31.9 GM/DL — LOW (ref 32–36)
MCV RBC AUTO: 92.2 FL — SIGNIFICANT CHANGE UP (ref 80–100)
NRBC # BLD: 0 /100 WBCS — SIGNIFICANT CHANGE UP (ref 0–0)
PLATELET # BLD AUTO: 348 K/UL — SIGNIFICANT CHANGE UP (ref 150–400)
POTASSIUM SERPL-MCNC: 4.1 MMOL/L — SIGNIFICANT CHANGE UP (ref 3.5–5.3)
POTASSIUM SERPL-SCNC: 4.1 MMOL/L — SIGNIFICANT CHANGE UP (ref 3.5–5.3)
PROTHROM AB SERPL-ACNC: 15.1 SEC — HIGH (ref 10.5–13.4)
RBC # BLD: 3.6 M/UL — LOW (ref 3.8–5.2)
RBC # FLD: 14.1 % — SIGNIFICANT CHANGE UP (ref 10.3–14.5)
SARS-COV-2 RNA SPEC QL NAA+PROBE: SIGNIFICANT CHANGE UP
SODIUM SERPL-SCNC: 141 MMOL/L — SIGNIFICANT CHANGE UP (ref 135–145)
WBC # BLD: 14.74 K/UL — HIGH (ref 3.8–10.5)
WBC # FLD AUTO: 14.74 K/UL — HIGH (ref 3.8–10.5)

## 2022-03-14 PROCEDURE — 99232 SBSQ HOSP IP/OBS MODERATE 35: CPT | Mod: GC

## 2022-03-14 PROCEDURE — 71045 X-RAY EXAM CHEST 1 VIEW: CPT | Mod: 26

## 2022-03-14 RX ORDER — ATORVASTATIN CALCIUM 80 MG/1
1 TABLET, FILM COATED ORAL
Qty: 0 | Refills: 0 | DISCHARGE
Start: 2022-03-14

## 2022-03-14 RX ORDER — ATENOLOL 25 MG/1
1 TABLET ORAL
Qty: 0 | Refills: 0 | DISCHARGE

## 2022-03-14 RX ORDER — SODIUM CHLORIDE 9 MG/ML
1000 INJECTION INTRAMUSCULAR; INTRAVENOUS; SUBCUTANEOUS
Refills: 0 | Status: DISCONTINUED | OUTPATIENT
Start: 2022-03-14 | End: 2022-03-15

## 2022-03-14 RX ORDER — WARFARIN SODIUM 2.5 MG/1
4 TABLET ORAL AT BEDTIME
Refills: 0 | Status: COMPLETED | OUTPATIENT
Start: 2022-03-14 | End: 2022-03-14

## 2022-03-14 RX ORDER — WARFARIN SODIUM 2.5 MG/1
1 TABLET ORAL
Qty: 0 | Refills: 0 | DISCHARGE

## 2022-03-14 RX ORDER — ATENOLOL 25 MG/1
1 TABLET ORAL
Qty: 0 | Refills: 0 | DISCHARGE
Start: 2022-03-14

## 2022-03-14 RX ADMIN — LISINOPRIL 5 MILLIGRAM(S): 2.5 TABLET ORAL at 05:40

## 2022-03-14 RX ADMIN — SODIUM CHLORIDE 75 MILLILITER(S): 9 INJECTION INTRAMUSCULAR; INTRAVENOUS; SUBCUTANEOUS at 05:33

## 2022-03-14 RX ADMIN — Medication 1 APPLICATION(S): at 12:58

## 2022-03-14 RX ADMIN — ATORVASTATIN CALCIUM 80 MILLIGRAM(S): 80 TABLET, FILM COATED ORAL at 21:24

## 2022-03-14 RX ADMIN — SODIUM CHLORIDE 75 MILLILITER(S): 9 INJECTION INTRAMUSCULAR; INTRAVENOUS; SUBCUTANEOUS at 12:10

## 2022-03-14 RX ADMIN — WARFARIN SODIUM 4 MILLIGRAM(S): 2.5 TABLET ORAL at 21:24

## 2022-03-14 RX ADMIN — ATENOLOL 50 MILLIGRAM(S): 25 TABLET ORAL at 05:34

## 2022-03-14 NOTE — PROGRESS NOTE ADULT - NUTRITIONAL ASSESSMENT
This patient has been assessed with a concern for Malnutrition and has been determined to have a diagnosis/diagnoses of Severe protein-calorie malnutrition and Underweight (BMI < 19).    This patient is being managed with:   Diet Pureed-  DASH/TLC {Sodium & Cholesterol Restricted}  Supplement Feeding Modality:  Oral  Ensure Enlive Cans or Servings Per Day:  1       Frequency:  Two Times a day  Entered: Mar  9 2022  1:54PM    
This patient has been assessed with a concern for Malnutrition and has been determined to have a diagnosis/diagnoses of Severe protein-calorie malnutrition and Underweight (BMI < 19).    This patient is being managed with:   Diet NPO-  Except Medications  With Ice Chips/Sips of Water  Entered: Mar  5 2022 12:41AM    
This patient has been assessed with a concern for Malnutrition and has been determined to have a diagnosis/diagnoses of Severe protein-calorie malnutrition and Underweight (BMI < 19).    This patient is being managed with:   Diet Pureed-  DASH/TLC {Sodium & Cholesterol Restricted}  Entered: Mar  7 2022  8:15PM    
This patient has been assessed with a concern for Malnutrition and has been determined to have a diagnosis/diagnoses of Severe protein-calorie malnutrition and Underweight (BMI < 19).    This patient is being managed with:   Diet Pureed-  DASH/TLC {Sodium & Cholesterol Restricted}  Supplement Feeding Modality:  Oral  Ensure Enlive Cans or Servings Per Day:  1       Frequency:  Two Times a day  Entered: Mar  9 2022  1:54PM    
This patient has been assessed with a concern for Malnutrition and has been determined to have a diagnosis/diagnoses of Severe protein-calorie malnutrition and Underweight (BMI < 19).    This patient is being managed with:   Diet Pureed-  DASH/TLC {Sodium & Cholesterol Restricted}  Supplement Feeding Modality:  Oral  Ensure Enlive Cans or Servings Per Day:  1       Frequency:  Two Times a day  Entered: Mar  9 2022  1:54PM    
This patient has been assessed with a concern for Malnutrition and has been determined to have a diagnosis/diagnoses of Severe protein-calorie malnutrition and Underweight (BMI < 19).    This patient is being managed with:   Diet Pureed-  DASH/TLC {Sodium & Cholesterol Restricted}  Entered: Mar  7 2022  8:15PM    
This patient has been assessed with a concern for Malnutrition and has been determined to have a diagnosis/diagnoses of Severe protein-calorie malnutrition and Underweight (BMI < 19).    This patient is being managed with:   Diet Pureed-  DASH/TLC {Sodium & Cholesterol Restricted}  Supplement Feeding Modality:  Oral  Ensure Enlive Cans or Servings Per Day:  1       Frequency:  Two Times a day  Entered: Mar  9 2022  1:54PM    
This patient has been assessed with a concern for Malnutrition and has been determined to have a diagnosis/diagnoses of Severe protein-calorie malnutrition and Underweight (BMI < 19).    This patient is being managed with:   Diet Pureed-  DASH/TLC {Sodium & Cholesterol Restricted}  Supplement Feeding Modality:  Oral  Ensure Enlive Cans or Servings Per Day:  1       Frequency:  Two Times a day  Entered: Mar  9 2022  1:54PM

## 2022-03-14 NOTE — PROGRESS NOTE ADULT - PROBLEM SELECTOR PLAN 8
DVT:FD lovenox

## 2022-03-14 NOTE — PROGRESS NOTE ADULT - ATTENDING COMMENTS
Patient seen and examined    Vital Signs Last 24 Hrs  T(C): 36.2 (14 Mar 2022 17:20), Max: 36.9 (13 Mar 2022 21:09)  T(F): 97.2 (14 Mar 2022 17:20), Max: 98.4 (13 Mar 2022 21:09)  HR: 80 (14 Mar 2022 17:20) (79 - 86)  BP: 155/60 (14 Mar 2022 17:20) (134/65 - 155/60)  RR: 16 (14 Mar 2022 17:20) (16 - 18)  SpO2: 99% (14 Mar 2022 17:20) (94% - 99%)    P/E:    Labs:                        10.6   14.74 )-----------( 348      ( 14 Mar 2022 05:48 )             33.2     03-14    141  |  109<H>  |  26<H>  ----------------------------<  101<H>  4.1   |  28  |  0.52    Ca    8.5      14 Mar 2022 05:48      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  Severe protein calorie malnutrition    Plan:    Continue low dose Coumadin; INR therapeutic; off Lovenox  Patient eating somewhat better; D/C Fluids and monitor  Ensure BID with meals  Given her age and functional status would be more easier to use DOACs like Apixaban as risk of bleed exist with any blood thinner, continue Coumadin for easy reversibility now and if tolerated well, can be transitioned to Apixaban in few weeks on discharge from [facility]; Neuro agrees  Son verbalized understanding and agrees with no real benefit from pursuing the work up of adnexal cyst and even may subject patient to more harm  Son agree not a candidate for Sx intervention nor any chemotherapy  Patient clinically much btter today; was able to participate in PT; on pressuring eats/ drinks; drank one glass Ensure with me  MOLST in chart; DNR status;  Spoke with Son Ryder and updated this evening    Discharge to [facility], accepted to [facility];  received Auth; no female beds until Monday; d/w ; needs a unvaccinated bed  As per Son eventual plan is to discharge Home with resumption of 24 hr HHA services as before but pt also has some HHA hrs through insurance 6 hrs 7 days a week; He requested increased HHA hrs;  I AGREE WITH PATIENT NEEDING INCREASED HHA HRS GIVEN ACUTE CVA AND SIGNIFICANT IMPAIRMENT IN FUNCTIONAL STATUS FROM BASELINE    DISCUSSED WITH RN/PCA;   PLEASE FEED WITH ASSISTANCE. Patient seen and examined with housestaff    Patient doing better; more alert and awake; eating better with staff; drinks Ensure well; not in distress; HR controlled    Vital Signs Last 24 Hrs  T(C): 36.2 (14 Mar 2022 17:20), Max: 36.9 (13 Mar 2022 21:09)  T(F): 97.2 (14 Mar 2022 17:20), Max: 98.4 (13 Mar 2022 21:09)  HR: 80 (14 Mar 2022 17:20) (79 - 86)  BP: 155/60 (14 Mar 2022 17:20) (134/65 - 155/60)  RR: 16 (14 Mar 2022 17:20) (16 - 18)  SpO2: 99% (14 Mar 2022 17:20) (94% - 99%)    P/E: as above;   Left hemiparesis/ flaccid limb    Labs:                      10.6   14.74 )-----------( 348      ( 14 Mar 2022 05:48 )             33.2   03-14    141  |  109<H>  |  26<H>  ----------------------------<  101<H>  4.1   |  28  |  0.52  Ca    8.5      14 Mar 2022 05:48    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 mercado who will follow pt in Rehab  Ensure BID with meals  Given her age and functional status would be more easier to use DOACs like Apixaban as risk of bleed exist with any blood thinner, continue Coumadin for easy reversibility now and if tolerated well, can be transitioned to Apixaban in few weeks on discharge from [facility]; Neuro agrees  Son verbalized understanding and agrees with no real benefit from pursuing the work up of adnexal cyst and even may subject patient to more harm  Son agree not a candidate for Sx intervention nor any chemotherapy  MOLST in chart; DNR status;  DISCUSSED WITH RN/PCA;   PLEASE FEED WITH ASSISTANCE. Aspiration precautions; HOB 45 degree while feeding    Discharge to [facility], accepted to [facility];  received Auth;  Patient was discharged in stable condition; reported by EMS after 30 mins of transport brought back stating Rapid HR and low oxygen  Patient on floor with /80, saturating 98% and HR 85; Patient is stable for transfer to Rehab; but given late evening will discharge tomorrow  Informed Case  Aura  Spoke with Venkata Soler and updated this evening    As per Son eventual plan is to discharge Home with resumption of 24 hr HHA services as before but pt also has some HHA hrs through insurance 6 hrs 7 days a week; He requested increased HHA hrs;  I AGREE WITH PATIENT NEEDING INCREASED HHA HRS GIVEN ACUTE CVA AND SIGNIFICANT IMPAIRMENT IN FUNCTIONAL STATUS FROM BASELINE  Discussed with PGY1/ PGY3

## 2022-03-14 NOTE — PROGRESS NOTE ADULT - PROBLEM SELECTOR PLAN 1
- p/w mental decompensation   - PE: total left-sided weakness, AAOx0, only response to pain  - CTH neg  - CT perfusion Small volume perfusion mismatch corresponds to the corona radiata bilaterally  - Trop 57>67.4>68.3  - Echo GIDD, mild AS  - MRI shows small right-sided acute/subacute infarcts, nonspecific subcentimeter focal gradient susceptibility effect with associated high T2 FLAIR signal in the right cerebellum. Punctate hemorrhage not excluded. Short-term follow-up exam recommended  - Repeat CT 3/8 shows no bleeding  - FD lovenox, continue to warfarin bridge  - INR 1.27 today  - Warfarin 4mg  - neurocheck & telemonitoring  - Patient medically stable for discharge. Patient accepted at [facility], awaiting auth

## 2022-03-14 NOTE — CHART NOTE - NSCHARTNOTEFT_GEN_A_CORE
Assessment:     Factors impacting intake: [ ] none [ ] nausea  [ ] vomiting [ ] diarrhea [ ] constipation  [ ]chewing problems [ ] swallowing issues  [ ] other:     Diet Presciption: Diet, Pureed:   DASH/TLC {Sodium & Cholesterol Restricted}  Supplement Feeding Modality:  Oral  Ensure Enlive Cans or Servings Per Day:  1       Frequency:  Two Times a day (03-09-22 @ 13:55)    Intake:     Daily   % Weight Change    Pertinent Medications: MEDICATIONS  (STANDING):  ATENolol  Tablet 50 milliGRAM(s) Oral daily  atorvastatin 80 milliGRAM(s) Oral at bedtime  BACItracin   Ointment 1 Application(s) Topical daily  dextrose 5% + sodium chloride 0.45%. 1000 milliLiter(s) (80 mL/Hr) IV Continuous <Continuous>  dextrose 5% + sodium chloride 0.45%. 1000 milliLiter(s) (80 mL/Hr) IV Continuous <Continuous>  lisinopril 5 milliGRAM(s) Oral daily  sodium chloride 0.9%. 1000 milliLiter(s) (75 mL/Hr) IV Continuous <Continuous>    MEDICATIONS  (PRN):    Pertinent Labs: 03-14 Na141 mmol/L Glu 101 mg/dL<H> K+ 4.1 mmol/L Cr  0.52 mg/dL BUN 26 mg/dL<H> 03-07 Chol 254 mg/dL<H> LDL --    HDL 86 mg/dL Trig 102 mg/dL     CAPILLARY BLOOD GLUCOSE          Skin:     Estimated Needs:   [ ] no change since previous assessment  [ ] recalculated:     Previous Nutrition Diagnosis:   [ ] Inadequate Energy Intake [ ]Inadequate Oral Intake [ ] Excessive Energy Intake   [ ] Underweight [ ] Increased Nutrient Needs [ ] Overweight/Obesity  [ ] Swallowing Difficult   [ ] Altered GI Function [ ] Unintended Weight Loss [ ] Food & Nutrition Related Knowledge Deficit [ ] Malnutrition   [ ] Not Ready for Diet/Life Style Changes     Nutrition Diagnosis is [ ] ongoing  [ ] Improving   [ ] resolved [ ] not applicable     New Nutrition Diagnosis: [ ] not applicable       Interventions:   Recommend  [ ] Change Diet To:  [ ] Nutrition Supplement  [ ] Nutrition Support  [ ] Other:     Monitoring and Evaluation:   [ ] PO intake [ x ] Tolerance to diet prescription [ x ] weights [ x ] labs[ x ] follow up per protocol  [ ] other: Assessment:     Nutrition reassessment for follow-up. Chart reviewed, pt visited, confused; s/p swallow evaluation with puree food, thin liquid recommended 3/7/22, diet advanced with oral nutritional supplement added; Pending discharge planning to skilled nursing facility rehab when medically ready     Factors impacting intake: [ X ] other: change in mental status; difficulty chewing; acute on chronic comorbidities including acte CVA    Diet Prescription: Diet, Pureed:   DASH/TLC {Sodium & Cholesterol Restricted}  Supplement Feeding Modality:  Oral  Ensure Enlive Cans or Servings Per Day:  1       Frequency:  Two Times a day (03-09-22 @ 13:55)    Intake: poor oral intake, observed breakfast, < 25% intake with food served, but able to tolerating 80% of Ensure Enlive today; no GI distress reported at present per RN     Daily   % Weight Change    Pertinent Medications: MEDICATIONS  (STANDING):  ATENolol  Tablet 50 milliGRAM(s) Oral daily  atorvastatin 80 milliGRAM(s) Oral at bedtime  BACItracin   Ointment 1 Application(s) Topical daily  dextrose 5% + sodium chloride 0.45%. 1000 milliLiter(s) (80 mL/Hr) IV Continuous <Continuous>  dextrose 5% + sodium chloride 0.45%. 1000 milliLiter(s) (80 mL/Hr) IV Continuous <Continuous>  lisinopril 5 milliGRAM(s) Oral daily  sodium chloride 0.9%. 1000 milliLiter(s) (75 mL/Hr) IV Continuous <Continuous>    MEDICATIONS  (PRN):    Pertinent Labs: 03-14 Na141 mmol/L Glu 101 mg/dL<H> K+ 4.1 mmol/L Cr  0.52 mg/dL BUN 26 mg/dL<H> 03-07 Chol 254 mg/dL<H> LDL --    HDL 86 mg/dL Trig 102 mg/dL     CAPILLARY BLOOD GLUCOSE    Skin: skin wound; Pressure Injury: stage I, II     Estimated Needs:   [ X ] no change since previous assessment  [ ] recalculated:     Previous Nutrition Diagnosis:   [ X ] Malnutrition ( SEVERE )     Nutrition Diagnosis is [ X ] ongoing  [ ] Improving   [ ] resolved [ ] not applicable     New Nutrition Diagnosis: [ X ] not applicable     Interventions:   Recommend  [ X ] Continue diet Rx as ordered with Ensure Enlive  1can ( 240ml ) x bid ( 700 kcal, 40 g protein) as ordered   [ ] Nutrition Supplement  [ ] Nutrition Support  [ X  ] Other: Discussed with MD/RN                     Nursing to continue feeding assistance and encouragement, aspiration precaution                     Pt to be followed by dietitian at skilled nursing facility when medically ready to be discharged     Monitoring and Evaluation:   [ X ] PO intake [ x ] Tolerance to diet prescription [ x ] weights [ x ] labs[ x ] follow up per protocol  [ ] other: Assessment:     Nutrition reassessment for follow-up. Chart reviewed, pt visited, confused; s/p swallow evaluation with puree food, thin liquid recommended 3/7/22, diet advanced with oral nutritional supplement added; Pending discharge planning to skilled nursing facility when medically ready per team    Factors impacting intake: [ X ] other: change in mental status; difficulty chewing; acute on chronic comorbidities including acte CVA    Diet Prescription: Diet, Pureed:   DASH/TLC {Sodium & Cholesterol Restricted}  Supplement Feeding Modality:  Oral  Ensure Enlive Cans or Servings Per Day:  1       Frequency:  Two Times a day (03-09-22 @ 13:55)    Intake: poor oral intake, observed < 25% intake with food served, but able to tolerating 80% of Ensure Enlive at breakfast today; no GI distress reported at present per RN     Daily   % Weight Change    Pertinent Medications: MEDICATIONS  (STANDING):  ATENolol  Tablet 50 milliGRAM(s) Oral daily  atorvastatin 80 milliGRAM(s) Oral at bedtime  BACItracin   Ointment 1 Application(s) Topical daily  dextrose 5% + sodium chloride 0.45%. 1000 milliLiter(s) (80 mL/Hr) IV Continuous <Continuous>  dextrose 5% + sodium chloride 0.45%. 1000 milliLiter(s) (80 mL/Hr) IV Continuous <Continuous>  lisinopril 5 milliGRAM(s) Oral daily  sodium chloride 0.9%. 1000 milliLiter(s) (75 mL/Hr) IV Continuous <Continuous>    MEDICATIONS  (PRN):    Pertinent Labs: 03-14 Na141 mmol/L Glu 101 mg/dL<H> K+ 4.1 mmol/L Cr  0.52 mg/dL BUN 26 mg/dL<H> 03-07 Chol 254 mg/dL<H> LDL --    HDL 86 mg/dL Trig 102 mg/dL     CAPILLARY BLOOD GLUCOSE    Skin: skin wound; Pressure Injury: stage I, II     Estimated Needs:   [ X ] no change since previous assessment  [ ] recalculated:     Previous Nutrition Diagnosis:   [ X ] Malnutrition ( SEVERE )     Nutrition Diagnosis is [ X ] ongoing  [ ] Improving   [ ] resolved [ ] not applicable     New Nutrition Diagnosis: [ X ] not applicable     Interventions:   Recommend  [ X ] Continue diet Rx as ordered with Ensure Enlive  1can ( 240ml ) x bid ( 700 kcal, 40 g protein) as ordered   [ ] Nutrition Supplement  [ ] Nutrition Support  [ X  ] Other: Discussed with Nursing                      Nursing to continue feeding assistance and encouragement, aspiration precaution                     Pt to be followed by dietitian at skilled nursing facility when medically ready to be discharged     Monitoring and Evaluation:   [ X ] PO intake [ x ] Tolerance to diet prescription [ x ] weights [ x ] labs[ x ] follow up per protocol  [ ] other:

## 2022-03-14 NOTE — DISCHARGE NOTE NURSING/CASE MANAGEMENT/SOCIAL WORK - NSDPFAC_GEN_ALL_CORE
[facility] Island Pedicle Flap With Canthal Suspension Text: The defect edges were debeveled with a #15 scalpel blade.  Given the location of the defect, shape of the defect and the proximity to free margins an island pedicle advancement flap was deemed most appropriate.  Using a sterile surgical marker, an appropriate advancement flap was drawn incorporating the defect, outlining the appropriate donor tissue and placing the expected incisions within the relaxed skin tension lines where possible. The area thus outlined was incised deep to adipose tissue with a #15 scalpel blade.  The skin margins were undermined to an appropriate distance in all directions around the primary defect and laterally outward around the island pedicle utilizing iris scissors.  There was minimal undermining beneath the pedicle flap. A suspension suture was placed in the canthal tendon to prevent tension and prevent ectropion.

## 2022-03-14 NOTE — DISCHARGE NOTE NURSING/CASE MANAGEMENT/SOCIAL WORK - NSDCPEFALRISK_GEN_ALL_CORE
For information on Fall & Injury Prevention, visit: [email or web address] OR  [email or web address] OR  https://www.cdc.gov/steadi/patient.html

## 2022-03-14 NOTE — PROGRESS NOTE ADULT - PROBLEM SELECTOR PROBLEM 7
HTN (hypertension)
Prophylactic measure
HTN (hypertension)
Prophylactic measure

## 2022-03-14 NOTE — DISCHARGE NOTE NURSING/CASE MANAGEMENT/SOCIAL WORK - PATIENT PORTAL LINK FT
You can access the FollowMyHealth Patient Portal offered by [facility] by registering at the following website: [email or web address]. By joining [facility]’s FollowMyHealth portal, you will also be able to view your health information using other applications (apps) compatible with our system.

## 2022-03-14 NOTE — PROGRESS NOTE ADULT - SUBJECTIVE AND OBJECTIVE BOX
PGY-1 Progress Note discussed with attending    PAGER #: [[phone]] TILL 5:00 PM  PLEASE CONTACT ON CALL TEAM:  - On Call Team (Please refer to Dru) FROM 5:00 PM - 8:30PM  - Nightfloat Team FROM 8:30 -7:30 AM    HPI:  Patient is a 89yoF, AAOx2-3 & ambulates w/ assistance at baseline, w/ PMH of Afib (not on AC 2/2 high risk of fall) & HTN, p/w LLE pain. Patient's is a poor historian due to current condition; thus, information obtained from the son. Patient was in a good state of health until recent fall 2 weeks ago. No interval history is available as the son lives in the different state. On 3/3/2022, patient was noted to have a very poor response and answers questions only with 1-2 words, but was able to move all extremities.    In ED, patient noted to have complete left-sided weakness. She is also unable to speak, but remained agitated. (04 Mar 2022 23:54)      OVERNIGHT EVENTS:   - No acute events. Patient seen at bedside. Patient sitting up in bed eating breakfast.    REVIEW OF SYSTEMS:  Unable to properly assess, but patient has no acute complaints    MEDICATIONS  (STANDING):  ATENolol  Tablet 50 milliGRAM(s) Oral daily  atorvastatin 80 milliGRAM(s) Oral at bedtime  BACItracin   Ointment 1 Application(s) Topical daily  lisinopril 5 milliGRAM(s) Oral daily  sodium chloride 0.9%. 1000 milliLiter(s) (75 mL/Hr) IV Continuous <Continuous>    MEDICATIONS  (PRN):      Vital Signs Last 24 Hrs  T(C): 36.4 (14 Mar 2022 13:23), Max: 36.9 (13 Mar 2022 21:09)  T(F): 97.6 (14 Mar 2022 13:23), Max: 98.4 (13 Mar 2022 21:09)  HR: 84 (14 Mar 2022 13:23) (79 - 86)  BP: 134/65 (14 Mar 2022 13:23) (134/65 - 151/69)  BP(mean): --  RR: 18 (14 Mar 2022 13:23) (18 - 18)  SpO2: 96% (14 Mar 2022 13:23) (94% - 99%)    PHYSICAL EXAMINATION:  GENERAL: NAD, AAOx  HEAD: AT/NC  EYES: conjunctiva and sclera clear  NECK: supple, No JVD noted, Normal thyroid  CHEST/LUNG: CTABL; no rales, rhonchi, wheezing, or rubs  HEART: regular rate and rhythm; no murmurs, rubs, or gallops  ABDOMEN: soft, nontender, nondistended; Bowel sounds present  EXTREMITIES:  2+ Peripheral Pulses, left sided hemiplegia  SKIN: warm dry                          10.6   14.74 )-----------( 348      ( 14 Mar 2022 05:48 )             33.2     03-14    141  |  109<H>  |  26<H>  ----------------------------<  101<H>  4.1   |  28  |  0.52    Ca    8.5      14 Mar 2022 05:48            PT/INR - ( 14 Mar 2022 05:48 )   PT: 15.1 sec;   INR: 1.27 ratio           COVID-19 PCR: NotDetec (14 Mar 2022 06:46)  COVID-19 PCR: NotDetec (10 Mar 2022 06:03)  COVID-19 PCR: NotDetec (04 Mar 2022 20:15)      CAPILLARY BLOOD GLUCOSE          RADIOLOGY & ADDITIONAL TESTS:

## 2022-03-15 VITALS
HEART RATE: 87 BPM | DIASTOLIC BLOOD PRESSURE: 71 MMHG | TEMPERATURE: 98 F | RESPIRATION RATE: 18 BRPM | SYSTOLIC BLOOD PRESSURE: 147 MMHG | OXYGEN SATURATION: 97 %

## 2022-03-15 PROCEDURE — 70551 MRI BRAIN STEM W/O DYE: CPT

## 2022-03-15 PROCEDURE — 72170 X-RAY EXAM OF PELVIS: CPT

## 2022-03-15 PROCEDURE — 80048 BASIC METABOLIC PNL TOTAL CA: CPT

## 2022-03-15 PROCEDURE — 82553 CREATINE MB FRACTION: CPT

## 2022-03-15 PROCEDURE — 0042T: CPT

## 2022-03-15 PROCEDURE — 83735 ASSAY OF MAGNESIUM: CPT

## 2022-03-15 PROCEDURE — 73562 X-RAY EXAM OF KNEE 3: CPT

## 2022-03-15 PROCEDURE — 92610 EVALUATE SWALLOWING FUNCTION: CPT

## 2022-03-15 PROCEDURE — 36415 COLL VENOUS BLD VENIPUNCTURE: CPT

## 2022-03-15 PROCEDURE — 87635 SARS-COV-2 COVID-19 AMP PRB: CPT

## 2022-03-15 PROCEDURE — 84484 ASSAY OF TROPONIN QUANT: CPT

## 2022-03-15 PROCEDURE — 85025 COMPLETE CBC W/AUTO DIFF WBC: CPT

## 2022-03-15 PROCEDURE — 99285 EMERGENCY DEPT VISIT HI MDM: CPT | Mod: 25

## 2022-03-15 PROCEDURE — 93306 TTE W/DOPPLER COMPLETE: CPT

## 2022-03-15 PROCEDURE — 85027 COMPLETE CBC AUTOMATED: CPT

## 2022-03-15 PROCEDURE — 84100 ASSAY OF PHOSPHORUS: CPT

## 2022-03-15 PROCEDURE — 96374 THER/PROPH/DIAG INJ IV PUSH: CPT

## 2022-03-15 PROCEDURE — 83036 HEMOGLOBIN GLYCOSYLATED A1C: CPT

## 2022-03-15 PROCEDURE — 70498 CT ANGIOGRAPHY NECK: CPT | Mod: MA

## 2022-03-15 PROCEDURE — 71045 X-RAY EXAM CHEST 1 VIEW: CPT

## 2022-03-15 PROCEDURE — 82550 ASSAY OF CK (CPK): CPT

## 2022-03-15 PROCEDURE — 72192 CT PELVIS W/O DYE: CPT

## 2022-03-15 PROCEDURE — 85610 PROTHROMBIN TIME: CPT

## 2022-03-15 PROCEDURE — 70496 CT ANGIOGRAPHY HEAD: CPT | Mod: MA

## 2022-03-15 PROCEDURE — 80061 LIPID PANEL: CPT

## 2022-03-15 PROCEDURE — 93005 ELECTROCARDIOGRAM TRACING: CPT

## 2022-03-15 PROCEDURE — 99239 HOSP IP/OBS DSCHRG MGMT >30: CPT | Mod: GC

## 2022-03-15 PROCEDURE — 70450 CT HEAD/BRAIN W/O DYE: CPT

## 2022-03-15 PROCEDURE — 80053 COMPREHEN METABOLIC PANEL: CPT

## 2022-03-15 PROCEDURE — 85730 THROMBOPLASTIN TIME PARTIAL: CPT

## 2022-03-15 RX ORDER — TRAMADOL HYDROCHLORIDE 50 MG/1
25 TABLET ORAL ONCE
Refills: 0 | Status: DISCONTINUED | OUTPATIENT
Start: 2022-03-15 | End: 2022-03-15

## 2022-03-15 RX ADMIN — LISINOPRIL 5 MILLIGRAM(S): 2.5 TABLET ORAL at 05:26

## 2022-03-15 RX ADMIN — MORPHINE SULFATE 2 MILLIGRAM(S): 50 CAPSULE, EXTENDED RELEASE ORAL at 04:44

## 2022-03-15 RX ADMIN — ATENOLOL 50 MILLIGRAM(S): 25 TABLET ORAL at 05:27

## 2022-03-15 RX ADMIN — TRAMADOL HYDROCHLORIDE 25 MILLIGRAM(S): 50 TABLET ORAL at 13:27

## 2022-04-04 ENCOUNTER — APPOINTMENT (OUTPATIENT)
Dept: NEUROLOGY | Facility: CLINIC | Age: 87
End: 2022-04-04

## 2022-04-18 ENCOUNTER — INPATIENT (INPATIENT)
Facility: HOSPITAL | Age: 87
LOS: 2 days | Discharge: NURSING, REHAB OR LONG-TERM CARE | DRG: 189 | End: 2022-04-21
Attending: INTERNAL MEDICINE | Admitting: INTERNAL MEDICINE
Payer: MEDICARE

## 2022-04-18 VITALS
DIASTOLIC BLOOD PRESSURE: 86 MMHG | OXYGEN SATURATION: 97 % | RESPIRATION RATE: 16 BRPM | SYSTOLIC BLOOD PRESSURE: 161 MMHG | WEIGHT: 91.93 LBS | TEMPERATURE: 98 F | HEIGHT: 63 IN | HEART RATE: 85 BPM

## 2022-04-18 DIAGNOSIS — R62.7 ADULT FAILURE TO THRIVE: ICD-10-CM

## 2022-04-18 DIAGNOSIS — J96.01 ACUTE RESPIRATORY FAILURE WITH HYPOXIA: ICD-10-CM

## 2022-04-18 DIAGNOSIS — I48.91 UNSPECIFIED ATRIAL FIBRILLATION: ICD-10-CM

## 2022-04-18 DIAGNOSIS — R79.1 ABNORMAL COAGULATION PROFILE: ICD-10-CM

## 2022-04-18 DIAGNOSIS — R09.02 HYPOXEMIA: ICD-10-CM

## 2022-04-18 DIAGNOSIS — Z29.9 ENCOUNTER FOR PROPHYLACTIC MEASURES, UNSPECIFIED: ICD-10-CM

## 2022-04-18 DIAGNOSIS — E87.0 HYPEROSMOLALITY AND HYPERNATREMIA: ICD-10-CM

## 2022-04-18 DIAGNOSIS — E16.2 HYPOGLYCEMIA, UNSPECIFIED: ICD-10-CM

## 2022-04-18 DIAGNOSIS — I26.99 OTHER PULMONARY EMBOLISM WITHOUT ACUTE COR PULMONALE: ICD-10-CM

## 2022-04-18 DIAGNOSIS — I10 ESSENTIAL (PRIMARY) HYPERTENSION: ICD-10-CM

## 2022-04-18 LAB
ALBUMIN SERPL ELPH-MCNC: 2.8 G/DL — LOW (ref 3.5–5)
ALP SERPL-CCNC: 87 U/L — SIGNIFICANT CHANGE UP (ref 40–120)
ALT FLD-CCNC: 16 U/L DA — SIGNIFICANT CHANGE UP (ref 10–60)
ANION GAP SERPL CALC-SCNC: 8 MMOL/L — SIGNIFICANT CHANGE UP (ref 5–17)
APTT BLD: 46.7 SEC — HIGH (ref 27.5–35.5)
AST SERPL-CCNC: 15 U/L — SIGNIFICANT CHANGE UP (ref 10–40)
BASOPHILS # BLD AUTO: 0.03 K/UL — SIGNIFICANT CHANGE UP (ref 0–0.2)
BASOPHILS NFR BLD AUTO: 0.3 % — SIGNIFICANT CHANGE UP (ref 0–2)
BILIRUB SERPL-MCNC: 0.3 MG/DL — SIGNIFICANT CHANGE UP (ref 0.2–1.2)
BUN SERPL-MCNC: 24 MG/DL — HIGH (ref 7–18)
CALCIUM SERPL-MCNC: 9 MG/DL — SIGNIFICANT CHANGE UP (ref 8.4–10.5)
CHLORIDE SERPL-SCNC: 113 MMOL/L — HIGH (ref 96–108)
CO2 SERPL-SCNC: 27 MMOL/L — SIGNIFICANT CHANGE UP (ref 22–31)
CREAT SERPL-MCNC: 0.7 MG/DL — SIGNIFICANT CHANGE UP (ref 0.5–1.3)
EGFR: 82 ML/MIN/1.73M2 — SIGNIFICANT CHANGE UP
EOSINOPHIL # BLD AUTO: 0.13 K/UL — SIGNIFICANT CHANGE UP (ref 0–0.5)
EOSINOPHIL NFR BLD AUTO: 1.2 % — SIGNIFICANT CHANGE UP (ref 0–6)
GLUCOSE BLDC GLUCOMTR-MCNC: 212 MG/DL — HIGH (ref 70–99)
GLUCOSE BLDC GLUCOMTR-MCNC: 47 MG/DL — CRITICAL LOW (ref 70–99)
GLUCOSE SERPL-MCNC: 129 MG/DL — HIGH (ref 70–99)
HCT VFR BLD CALC: 36.7 % — SIGNIFICANT CHANGE UP (ref 34.5–45)
HGB BLD-MCNC: 11.6 G/DL — SIGNIFICANT CHANGE UP (ref 11.5–15.5)
IMM GRANULOCYTES NFR BLD AUTO: 0.5 % — SIGNIFICANT CHANGE UP (ref 0–1.5)
INR BLD: 5.29 RATIO — CRITICAL HIGH (ref 0.88–1.16)
LACTATE SERPL-SCNC: 1.8 MMOL/L — SIGNIFICANT CHANGE UP (ref 0.7–2)
LACTATE SERPL-SCNC: 2.1 MMOL/L — HIGH (ref 0.7–2)
LACTATE SERPL-SCNC: 2.2 MMOL/L — HIGH (ref 0.7–2)
LYMPHOCYTES # BLD AUTO: 1.74 K/UL — SIGNIFICANT CHANGE UP (ref 1–3.3)
LYMPHOCYTES # BLD AUTO: 16.3 % — SIGNIFICANT CHANGE UP (ref 13–44)
MCHC RBC-ENTMCNC: 29.5 PG — SIGNIFICANT CHANGE UP (ref 27–34)
MCHC RBC-ENTMCNC: 31.6 GM/DL — LOW (ref 32–36)
MCV RBC AUTO: 93.4 FL — SIGNIFICANT CHANGE UP (ref 80–100)
MONOCYTES # BLD AUTO: 0.75 K/UL — SIGNIFICANT CHANGE UP (ref 0–0.9)
MONOCYTES NFR BLD AUTO: 7 % — SIGNIFICANT CHANGE UP (ref 2–14)
NEUTROPHILS # BLD AUTO: 7.97 K/UL — HIGH (ref 1.8–7.4)
NEUTROPHILS NFR BLD AUTO: 74.7 % — SIGNIFICANT CHANGE UP (ref 43–77)
NRBC # BLD: 0 /100 WBCS — SIGNIFICANT CHANGE UP (ref 0–0)
NT-PROBNP SERPL-SCNC: 2042 PG/ML — HIGH (ref 0–450)
PLATELET # BLD AUTO: 308 K/UL — SIGNIFICANT CHANGE UP (ref 150–400)
POTASSIUM SERPL-MCNC: 3.6 MMOL/L — SIGNIFICANT CHANGE UP (ref 3.5–5.3)
POTASSIUM SERPL-SCNC: 3.6 MMOL/L — SIGNIFICANT CHANGE UP (ref 3.5–5.3)
PROT SERPL-MCNC: 6.6 G/DL — SIGNIFICANT CHANGE UP (ref 6–8.3)
PROTHROM AB SERPL-ACNC: 64 SEC — HIGH (ref 10.5–13.4)
RBC # BLD: 3.93 M/UL — SIGNIFICANT CHANGE UP (ref 3.8–5.2)
RBC # FLD: 15.6 % — HIGH (ref 10.3–14.5)
SARS-COV-2 RNA SPEC QL NAA+PROBE: SIGNIFICANT CHANGE UP
SODIUM SERPL-SCNC: 148 MMOL/L — HIGH (ref 135–145)
TROPONIN I, HIGH SENSITIVITY RESULT: 41.4 NG/L — SIGNIFICANT CHANGE UP
WBC # BLD: 10.67 K/UL — HIGH (ref 3.8–10.5)
WBC # FLD AUTO: 10.67 K/UL — HIGH (ref 3.8–10.5)

## 2022-04-18 PROCEDURE — 71045 X-RAY EXAM CHEST 1 VIEW: CPT | Mod: 26

## 2022-04-18 PROCEDURE — 99285 EMERGENCY DEPT VISIT HI MDM: CPT

## 2022-04-18 PROCEDURE — 70450 CT HEAD/BRAIN W/O DYE: CPT | Mod: 26

## 2022-04-18 RX ORDER — ACETAMINOPHEN 500 MG
2 TABLET ORAL
Qty: 0 | Refills: 0 | DISCHARGE

## 2022-04-18 RX ORDER — FUROSEMIDE 40 MG
1 TABLET ORAL
Qty: 0 | Refills: 0 | DISCHARGE

## 2022-04-18 RX ORDER — LISINOPRIL 2.5 MG/1
5 TABLET ORAL DAILY
Refills: 0 | Status: DISCONTINUED | OUTPATIENT
Start: 2022-04-18 | End: 2022-04-21

## 2022-04-18 RX ORDER — ATENOLOL 25 MG/1
50 TABLET ORAL DAILY
Refills: 0 | Status: DISCONTINUED | OUTPATIENT
Start: 2022-04-18 | End: 2022-04-21

## 2022-04-18 RX ORDER — ESCITALOPRAM OXALATE 10 MG/1
1 TABLET, FILM COATED ORAL
Qty: 0 | Refills: 0 | DISCHARGE

## 2022-04-18 RX ORDER — ESCITALOPRAM OXALATE 10 MG/1
5 TABLET, FILM COATED ORAL DAILY
Refills: 0 | Status: DISCONTINUED | OUTPATIENT
Start: 2022-04-18 | End: 2022-04-21

## 2022-04-18 RX ORDER — SODIUM CHLORIDE 9 MG/ML
1000 INJECTION, SOLUTION INTRAVENOUS
Refills: 0 | Status: DISCONTINUED | OUTPATIENT
Start: 2022-04-18 | End: 2022-04-21

## 2022-04-18 RX ORDER — DEXTROSE 50 % IN WATER 50 %
50 SYRINGE (ML) INTRAVENOUS ONCE
Refills: 0 | Status: COMPLETED | OUTPATIENT
Start: 2022-04-18 | End: 2022-04-19

## 2022-04-18 RX ORDER — SODIUM CHLORIDE 9 MG/ML
1000 INJECTION, SOLUTION INTRAVENOUS
Refills: 0 | Status: DISCONTINUED | OUTPATIENT
Start: 2022-04-18 | End: 2022-04-18

## 2022-04-18 RX ORDER — ENOXAPARIN SODIUM 100 MG/ML
40 INJECTION SUBCUTANEOUS EVERY 12 HOURS
Refills: 0 | Status: DISCONTINUED | OUTPATIENT
Start: 2022-04-18 | End: 2022-04-18

## 2022-04-18 RX ORDER — SODIUM CHLORIDE 9 MG/ML
1000 INJECTION INTRAMUSCULAR; INTRAVENOUS; SUBCUTANEOUS ONCE
Refills: 0 | Status: COMPLETED | OUTPATIENT
Start: 2022-04-18 | End: 2022-04-18

## 2022-04-18 RX ORDER — DEXTROSE 50 % IN WATER 50 %
50 SYRINGE (ML) INTRAVENOUS ONCE
Refills: 0 | Status: COMPLETED | OUTPATIENT
Start: 2022-04-18 | End: 2022-04-18

## 2022-04-18 RX ORDER — SODIUM CHLORIDE 9 MG/ML
1000 INJECTION, SOLUTION INTRAVENOUS ONCE
Refills: 0 | Status: COMPLETED | OUTPATIENT
Start: 2022-04-18 | End: 2022-04-18

## 2022-04-18 RX ORDER — WARFARIN SODIUM 2.5 MG/1
1 TABLET ORAL
Qty: 0 | Refills: 0 | DISCHARGE

## 2022-04-18 RX ORDER — LISINOPRIL 2.5 MG/1
1 TABLET ORAL
Qty: 0 | Refills: 0 | DISCHARGE

## 2022-04-18 RX ADMIN — ENOXAPARIN SODIUM 40 MILLIGRAM(S): 100 INJECTION SUBCUTANEOUS at 20:56

## 2022-04-18 RX ADMIN — SODIUM CHLORIDE 75 MILLILITER(S): 9 INJECTION, SOLUTION INTRAVENOUS at 16:56

## 2022-04-18 RX ADMIN — SODIUM CHLORIDE 1000 MILLILITER(S): 9 INJECTION INTRAMUSCULAR; INTRAVENOUS; SUBCUTANEOUS at 12:00

## 2022-04-18 RX ADMIN — Medication 50 MILLILITER(S): at 13:34

## 2022-04-18 RX ADMIN — SODIUM CHLORIDE 1000 MILLILITER(S): 9 INJECTION, SOLUTION INTRAVENOUS at 21:02

## 2022-04-18 RX ADMIN — Medication 50 MILLILITER(S): at 16:00

## 2022-04-18 NOTE — H&P ADULT - NSHPPHYSICALEXAM_GEN_ALL_CORE
Vital Signs Last 24 Hrs  T(C): 36.6 (18 Apr 2022 15:30), Max: 36.9 (18 Apr 2022 11:37)  T(F): 97.9 (18 Apr 2022 15:30), Max: 98.4 (18 Apr 2022 11:37)  HR: 97 (18 Apr 2022 15:30) (85 - 97)  BP: 172/99 (18 Apr 2022 15:30) (161/86 - 172/99)  BP(mean): --  RR: 18 (18 Apr 2022 15:30) (16 - 18)  SpO2: 97% (18 Apr 2022 11:37) (97% - 97%)    GENERAL: NAD  EYES: EOMI, PERRLA,   NECK: Supple, No JVD  CHEST/LUNG: Clear to auscultation b/l  No rales, rhonchi, wheezing   HEART: Regular rate and rhythm; No murmurs, +ve S1 S2   ABDOMEN: Soft, Nontender, Nondistended; Bowel sounds present  NERVOUS SYSTEM:  Alert & Oriented X1, no focal neurologic deficit   EXTREMITIES:   No clubbing, or edema, cyanotic fingers noted on right hand

## 2022-04-18 NOTE — ED ADULT NURSE NOTE - OBJECTIVE STATEMENT
BIB EMS from [facility] BIB EMS from [facility] for Hypoxia and failure to thrive. Patient disoriented unable to provide history.

## 2022-04-18 NOTE — H&P ADULT - PROBLEM SELECTOR PLAN 6
On atenolol and coumadin  Held coumadin for supra therapeutic INR   Full dose Lovenox and resume atenolol   CHADSVASC score 6  HAS-BLED score 4

## 2022-04-18 NOTE — H&P ADULT - PROBLEM SELECTOR PLAN 5
Glucose 57> d50>47>d50 with D5W  Likely in the setting of poor po intake   FS q4hr   HgbA1C 5.1 in March 2022

## 2022-04-18 NOTE — ED PROVIDER NOTE - OBJECTIVE STATEMENT
89 yo F h/o CVA, HTN, PE on coumadin p/w SOB today. Saturation 85% on RA. Pt also noted to have FTT despite encouragement of PO for past week. Currently on FM. History limited as pt AOx1. Info from NH records.

## 2022-04-18 NOTE — H&P ADULT - PROBLEM SELECTOR PLAN 1
-Pt sent from NH for hypoxia   -Chest x-ray without any abnormality   -EKG NSR , trop neg   -MAR assessed pt's O2 saturation on left hand finger(non cyanotic finger) and also confirmed her O2 sat with pulse ox on forehead and both time pt was 100% on RA  -Pt doesn't need oxygen at this point , afebrile , Chest X-ray normal  -Starting her home diet pureed with thick liquid   -Aspiration precaution   -Monitor O2 saturation -Pt sent from NH for hypoxia   -Chest x-ray without any abnormality   -EKG NSR , trop neg   -MAR assessed pt's O2 saturation on left hand finger(non cyanotic finger) and also confirmed her O2 sat with pulse ox on forehead and both time pt was 100% on RA  -Pt doesn't need oxygen at this point , afebrile , Chest X-ray normal  -Starting her home diet pureed with thick liquid   -Aspiration precaution   -Monitor O2 saturation  -Will send BCx , hold off abx -Pt sent from NH for hypoxia   -Chest x-ray without any abnormality   -EKG NSR , trop neg   -MAR assessed pt's O2 saturation on left hand finger(non cyanotic finger) and also confirmed her O2 sat with pulse ox on forehead and both time pt was 100% on RA  -Pt doesn't need oxygen at this point , afebrile , Chest X-ray normal  -Starting her home diet pureed with thick liquid   -Aspiration precaution   -Monitor O2 saturation  -Will send BCx , hold off abx  -Pulm Dr. Bond consulted

## 2022-04-18 NOTE — H&P ADULT - PROBLEM SELECTOR PLAN 2
-Na 147  -900ml free water deficit   -D5 75cc/hr for hypoglycemia and hypernatremia  -Will encourage for PO intake   -Monitor BMP daily

## 2022-04-18 NOTE — H&P ADULT - ASSESSMENT
89 yo F, from home, AAOx2-3 & ambulates w/ assistance at baseline (per prior records), PMHx of HTN, CVA, PE, Afib was sent for hypoxia and FTT

## 2022-04-18 NOTE — H&P ADULT - HISTORY OF PRESENT ILLNESS
89 yo F, from home, AAOx2-3 & ambulates w/ assistance at baseline (per prior records), PMHx of HTN, CVA, PE, Afib was sent for hypoxia. Pt assessed at bedside AxO1, knows her , and aware to self. Pt doesn't know why she is in the hospital but denied any chest pain, sob, N/V/D. Per NH papers pt was hypoxic to 84% on RA and refusing to eat and drink for 5 days and on IV fluids.     Of note pt recently admitted to [facility] for AMS and was diagnosed with acute /subacute infarct on MRI brain. Pt was started on full dose AC Coumadin given multiple strokes and hx of Afib (she was not on AC previously due to falls)     ED course: Chest x-ray without any abnormality . Na 147, Lactate 2.1, FS 53, ProBNP 2042, EKG NSR  MAR assessed pt's O2 saturation on left hand finger(non cyanotic finger) and also confirmed her O2 sat with pulse ox on forehead and both time pt was 100% on RA 89 yo F, from home, AAOx2-3 & ambulates w/ assistance at baseline (per prior records), PMHx of HTN, CVA, PE, Afib was sent for hypoxia. Pt assessed at bedside AxO1, knows her , and aware to self. Pt doesn't know why she is in the hospital but denied any chest pain, sob, N/V/D. Per NH papers pt was hypoxic to 84% on RA and refusing to eat and drink for 5 days and on IV fluids.     Of note pt recently admitted to [facility] for AMS and was diagnosed with acute /subacute infarct on MRI brain. Pt was started on full dose AC Coumadin given multiple strokes and hx of Afib (she was not on AC previously due to falls)     ED course: Chest x-ray without any abnormality . Na 147, Lactate 2.1, FS 53, ProBNP 2042, EKG NSR.     MAR assessed pt's O2 saturation on left hand finger(non cyanotic finger) and also confirmed her O2 sat with pulse ox on forehead and both time pt was 100% on RA

## 2022-04-18 NOTE — H&P ADULT - PROBLEM SELECTOR PLAN 4
INR 5.29   No s/s of bleeding   Will hold coumadin and will start on full dose Lovenox for now   Fall precaution

## 2022-04-18 NOTE — H&P ADULT - PROBLEM SELECTOR PLAN 3
-Pt poor PO intake for 6 days   -On IVF at NH   -No respiratory tract infection , COVID neg   -Will send UA and UCx to r/o UTI   -If pt continue to refuse PO intake and infectious etiology rule out , can consider tube feeding (per MOLST form trial of feeding tube) -Pt poor PO intake for 6 days   -On IVF at NH   -No respiratory tract infection , COVID neg   -Will send UA and UCx to r/o UTI   -If pt continue to refuse PO intake and infectious etiology rule out , can consider tube feeding (per MOLST form trial of feeding tube)  -Pt recently started on escitalopram at NH after her recent discharged on 3/14/22 from [facility] and no hx of depression per prior records  -Resume escitalopram -Pt poor PO intake for 6 days   -On IVF at NH   -No respiratory tract infection , COVID neg   -Will send UA and UCx to r/o UTI as well as blood culture   -If pt continue to refuse PO intake and infectious etiology rule out , can consider tube feeding (per MOLST form trial of feeding tube)  -Pt recently started on escitalopram at NH after her recent discharged on 3/14/22 from [facility] and no hx of depression per prior records  -Resume escitalopram

## 2022-04-18 NOTE — ED PROVIDER NOTE - NS ED MD DISPO DIVISION
verbalizes understanding/demonstrates understanding of teaching/good return demonstration/needs met
[facility]

## 2022-04-18 NOTE — H&P ADULT - ATTENDING COMMENTS
discussed management plan with house stafff  pt with dec po intake, iv fluids  f/u lactate level   pulm f/u

## 2022-04-18 NOTE — ED PROVIDER NOTE - CLINICAL SUMMARY MEDICAL DECISION MAKING FREE TEXT BOX
89 yo F BIBA from NH for SOB, hypoxia, FTT. Suspect metabolic derangement, possible PNA, pulmonary edema, ACS. Will perform CXR, labs, EKG, and reassess. Pt DNR, DNI with MOLST in chart.

## 2022-04-19 DIAGNOSIS — J96.01 ACUTE RESPIRATORY FAILURE WITH HYPOXIA: ICD-10-CM

## 2022-04-19 DIAGNOSIS — N39.0 URINARY TRACT INFECTION, SITE NOT SPECIFIED: ICD-10-CM

## 2022-04-19 DIAGNOSIS — Z02.9 ENCOUNTER FOR ADMINISTRATIVE EXAMINATIONS, UNSPECIFIED: ICD-10-CM

## 2022-04-19 LAB
ALBUMIN SERPL ELPH-MCNC: 2.2 G/DL — LOW (ref 3.5–5)
ALP SERPL-CCNC: 81 U/L — SIGNIFICANT CHANGE UP (ref 40–120)
ALT FLD-CCNC: 14 U/L DA — SIGNIFICANT CHANGE UP (ref 10–60)
ANION GAP SERPL CALC-SCNC: 10 MMOL/L — SIGNIFICANT CHANGE UP (ref 5–17)
APPEARANCE UR: CLEAR — SIGNIFICANT CHANGE UP
APTT BLD: 56.2 SEC — HIGH (ref 27.5–35.5)
AST SERPL-CCNC: 15 U/L — SIGNIFICANT CHANGE UP (ref 10–40)
BACTERIA # UR AUTO: ABNORMAL /HPF
BILIRUB SERPL-MCNC: 0.5 MG/DL — SIGNIFICANT CHANGE UP (ref 0.2–1.2)
BILIRUB UR-MCNC: NEGATIVE — SIGNIFICANT CHANGE UP
BUN SERPL-MCNC: 14 MG/DL — SIGNIFICANT CHANGE UP (ref 7–18)
CALCIUM SERPL-MCNC: 8.4 MG/DL — SIGNIFICANT CHANGE UP (ref 8.4–10.5)
CHLORIDE SERPL-SCNC: 105 MMOL/L — SIGNIFICANT CHANGE UP (ref 96–108)
CO2 SERPL-SCNC: 25 MMOL/L — SIGNIFICANT CHANGE UP (ref 22–31)
COLOR SPEC: YELLOW — SIGNIFICANT CHANGE UP
COMMENT - URINE: SIGNIFICANT CHANGE UP
COMMENT - URINE: SIGNIFICANT CHANGE UP
CREAT SERPL-MCNC: 0.52 MG/DL — SIGNIFICANT CHANGE UP (ref 0.5–1.3)
CRP SERPL-MCNC: 10 MG/L — HIGH
DIFF PNL FLD: NEGATIVE — SIGNIFICANT CHANGE UP
EGFR: 88 ML/MIN/1.73M2 — SIGNIFICANT CHANGE UP
EPI CELLS # UR: SIGNIFICANT CHANGE UP /HPF
ERYTHROCYTE [SEDIMENTATION RATE] IN BLOOD: 46 MM/HR — HIGH (ref 0–20)
GLUCOSE BLDC GLUCOMTR-MCNC: 105 MG/DL — HIGH (ref 70–99)
GLUCOSE BLDC GLUCOMTR-MCNC: 105 MG/DL — HIGH (ref 70–99)
GLUCOSE BLDC GLUCOMTR-MCNC: 107 MG/DL — HIGH (ref 70–99)
GLUCOSE BLDC GLUCOMTR-MCNC: 125 MG/DL — HIGH (ref 70–99)
GLUCOSE BLDC GLUCOMTR-MCNC: 152 MG/DL — HIGH (ref 70–99)
GLUCOSE BLDC GLUCOMTR-MCNC: 195 MG/DL — HIGH (ref 70–99)
GLUCOSE BLDC GLUCOMTR-MCNC: 35 MG/DL — CRITICAL LOW (ref 70–99)
GLUCOSE BLDC GLUCOMTR-MCNC: 94 MG/DL — SIGNIFICANT CHANGE UP (ref 70–99)
GLUCOSE SERPL-MCNC: 96 MG/DL — SIGNIFICANT CHANGE UP (ref 70–99)
GLUCOSE UR QL: 250
HCT VFR BLD CALC: 31.4 % — LOW (ref 34.5–45)
HGB BLD-MCNC: 10.3 G/DL — LOW (ref 11.5–15.5)
INR BLD: 5.7 RATIO — CRITICAL HIGH (ref 0.88–1.16)
KETONES UR-MCNC: NEGATIVE — SIGNIFICANT CHANGE UP
LEUKOCYTE ESTERASE UR-ACNC: NEGATIVE — SIGNIFICANT CHANGE UP
MAGNESIUM SERPL-MCNC: 1.7 MG/DL — SIGNIFICANT CHANGE UP (ref 1.6–2.6)
MCHC RBC-ENTMCNC: 30 PG — SIGNIFICANT CHANGE UP (ref 27–34)
MCHC RBC-ENTMCNC: 32.8 GM/DL — SIGNIFICANT CHANGE UP (ref 32–36)
MCV RBC AUTO: 91.5 FL — SIGNIFICANT CHANGE UP (ref 80–100)
MRSA PCR RESULT.: SIGNIFICANT CHANGE UP
NITRITE UR-MCNC: POSITIVE
NRBC # BLD: 0 /100 WBCS — SIGNIFICANT CHANGE UP (ref 0–0)
PH UR: 6.5 — SIGNIFICANT CHANGE UP (ref 5–8)
PHOSPHATE SERPL-MCNC: 1.8 MG/DL — LOW (ref 2.5–4.5)
PLATELET # BLD AUTO: 317 K/UL — SIGNIFICANT CHANGE UP (ref 150–400)
POTASSIUM SERPL-MCNC: 3 MMOL/L — LOW (ref 3.5–5.3)
POTASSIUM SERPL-SCNC: 3 MMOL/L — LOW (ref 3.5–5.3)
PROCALCITONIN SERPL-MCNC: 0.1 NG/ML — SIGNIFICANT CHANGE UP (ref 0.02–0.1)
PROT SERPL-MCNC: 5.8 G/DL — LOW (ref 6–8.3)
PROT UR-MCNC: NEGATIVE — SIGNIFICANT CHANGE UP
PROTHROM AB SERPL-ACNC: 69 SEC — HIGH (ref 10.5–13.4)
RBC # BLD: 3.43 M/UL — LOW (ref 3.8–5.2)
RBC # FLD: 15.5 % — HIGH (ref 10.3–14.5)
RBC CASTS # UR COMP ASSIST: SIGNIFICANT CHANGE UP /HPF (ref 0–2)
S AUREUS DNA NOSE QL NAA+PROBE: SIGNIFICANT CHANGE UP
SODIUM SERPL-SCNC: 140 MMOL/L — SIGNIFICANT CHANGE UP (ref 135–145)
SP GR SPEC: 1.01 — SIGNIFICANT CHANGE UP (ref 1.01–1.02)
UROBILINOGEN FLD QL: NEGATIVE — SIGNIFICANT CHANGE UP
WBC # BLD: 12.16 K/UL — HIGH (ref 3.8–10.5)
WBC # FLD AUTO: 12.16 K/UL — HIGH (ref 3.8–10.5)
WBC UR QL: SIGNIFICANT CHANGE UP /HPF (ref 0–5)

## 2022-04-19 RX ORDER — COLLAGENASE CLOSTRIDIUM HIST. 250 UNIT/G
1 OINTMENT (GRAM) TOPICAL DAILY
Refills: 0 | Status: DISCONTINUED | OUTPATIENT
Start: 2022-04-19 | End: 2022-04-21

## 2022-04-19 RX ORDER — CEFTRIAXONE 500 MG/1
1000 INJECTION, POWDER, FOR SOLUTION INTRAMUSCULAR; INTRAVENOUS EVERY 24 HOURS
Refills: 0 | Status: COMPLETED | OUTPATIENT
Start: 2022-04-19 | End: 2022-04-21

## 2022-04-19 RX ORDER — POTASSIUM CHLORIDE 20 MEQ
40 PACKET (EA) ORAL ONCE
Refills: 0 | Status: COMPLETED | OUTPATIENT
Start: 2022-04-19 | End: 2022-04-19

## 2022-04-19 RX ORDER — POTASSIUM PHOSPHATE, MONOBASIC POTASSIUM PHOSPHATE, DIBASIC 236; 224 MG/ML; MG/ML
15 INJECTION, SOLUTION INTRAVENOUS ONCE
Refills: 0 | Status: COMPLETED | OUTPATIENT
Start: 2022-04-19 | End: 2022-04-19

## 2022-04-19 RX ORDER — POTASSIUM CHLORIDE 20 MEQ
10 PACKET (EA) ORAL
Refills: 0 | Status: COMPLETED | OUTPATIENT
Start: 2022-04-19 | End: 2022-04-19

## 2022-04-19 RX ORDER — SODIUM CHLORIDE 9 MG/ML
1000 INJECTION, SOLUTION INTRAVENOUS
Refills: 0 | Status: DISCONTINUED | OUTPATIENT
Start: 2022-04-19 | End: 2022-04-21

## 2022-04-19 RX ORDER — TAMSULOSIN HYDROCHLORIDE 0.4 MG/1
0.4 CAPSULE ORAL AT BEDTIME
Refills: 0 | Status: DISCONTINUED | OUTPATIENT
Start: 2022-04-19 | End: 2022-04-21

## 2022-04-19 RX ORDER — TAMSULOSIN HYDROCHLORIDE 0.4 MG/1
0.4 CAPSULE ORAL ONCE
Refills: 0 | Status: COMPLETED | OUTPATIENT
Start: 2022-04-19 | End: 2022-04-19

## 2022-04-19 RX ADMIN — TAMSULOSIN HYDROCHLORIDE 0.4 MILLIGRAM(S): 0.4 CAPSULE ORAL at 22:18

## 2022-04-19 RX ADMIN — Medication 100 MILLIEQUIVALENT(S): at 10:54

## 2022-04-19 RX ADMIN — POTASSIUM PHOSPHATE, MONOBASIC POTASSIUM PHOSPHATE, DIBASIC 62.5 MILLIMOLE(S): 236; 224 INJECTION, SOLUTION INTRAVENOUS at 13:16

## 2022-04-19 RX ADMIN — Medication 100 MILLIEQUIVALENT(S): at 12:08

## 2022-04-19 RX ADMIN — CEFTRIAXONE 100 MILLIGRAM(S): 500 INJECTION, POWDER, FOR SOLUTION INTRAMUSCULAR; INTRAVENOUS at 10:53

## 2022-04-19 RX ADMIN — TAMSULOSIN HYDROCHLORIDE 0.4 MILLIGRAM(S): 0.4 CAPSULE ORAL at 01:35

## 2022-04-19 RX ADMIN — Medication 50 MILLILITER(S): at 00:07

## 2022-04-19 RX ADMIN — SODIUM CHLORIDE 75 MILLILITER(S): 9 INJECTION, SOLUTION INTRAVENOUS at 00:08

## 2022-04-19 RX ADMIN — SODIUM CHLORIDE 75 MILLILITER(S): 9 INJECTION, SOLUTION INTRAVENOUS at 10:54

## 2022-04-19 NOTE — PATIENT PROFILE ADULT - NSPRONUTRITIONRISK_GEN_A_NUR
Pressure injury stage 2 or greater/BMI less than 19/Significant decrease of oral intake greater than 3 days prior to admission

## 2022-04-19 NOTE — SWALLOW BEDSIDE ASSESSMENT ADULT - ORAL PREPARATORY PHASE
Reduced oral grading Reduced orientation to spoon & syringe presentation requiring tactile cueing/Reduced oral grading Reduced oral grading/Decreased mastication ability

## 2022-04-19 NOTE — ADVANCED PRACTICE NURSE CONSULT - ASSESSMENT
This is a 89yr old female patient admitted for Hypernatremia, presenting with the following:  -There is a Stage 1 Pressure Injury to the Bilateral Heels, as evident by non-blanchable erythema  -There is an Unstageable Pressure Injury to the Coccyx with slough at the center, pink tissue, and drainage

## 2022-04-19 NOTE — CONSULT NOTE ADULT - ASSESSMENT
S/P hypoxemia/ error   H/O PE with Coagulopathy   Htn with MR with H/O AF   CVA with Lt Hemiplegia   TIA       Plan-- frequent suctioning   O2 n/c 2lpm  Hod coumadin   Repeat PT /PTT  in am   CT Head repeat to r/o bleed/ new cva   Discussed with ER team   Thanks for consult
89 yo F, from nursing home, PMHx of HTN, CVA, PE, Afib was sent for hypoxia at the nursing home and  appetite for 5 days. CT head and chest x-ray with no acute findings. Pulm on board. Found to have UA + nitrite with leucocytosis and lactate. Started on ceftriaxone. Will follow up Urine and blood culture results. Pt was also found to have supratherepeutic INR, Coumadin held for now and was placed on Lovenox . Pt failed dysphagia screening, speech and swallow eval requested with palliative consult for functional deconditioning

## 2022-04-19 NOTE — SWALLOW BEDSIDE ASSESSMENT ADULT - ADDITIONAL RECOMMENDATIONS
+ Consider alternative means of nutrition/hydration if needs cannot be met by PO, if in alignment with pt's/HCP's goals of care.

## 2022-04-19 NOTE — SWALLOW BEDSIDE ASSESSMENT ADULT - SWALLOW EVAL: DIAGNOSIS
Pt p/w s&s mild oropharyngeal dysphagia w/ behavioral component c/b impaired reception, poor labial seal 2/2 frequent talking, decreased bolus formation, prolonged oral bolus holding, oral stasis, delayed swallow trigger, & decreased hyolaryngeal elevation. No overt s/s aspiration on trials. Pt tended to initiate talking while food still present in mouth.

## 2022-04-19 NOTE — SWALLOW BEDSIDE ASSESSMENT ADULT - COMMENTS
Pt AA+Ox0, spontaneous non-sequitur responses, inconsistently responds to cues. HOB elevated to 90°. Restless, Talking to self.

## 2022-04-19 NOTE — SWALLOW BEDSIDE ASSESSMENT ADULT - ORAL PHASE
prolonged oral bolus holding/Decreased anterior-posterior movement of the bolus prolonged oral bolus holding/Decreased anterior-posterior movement of the bolus/Delayed oral transit time Decreased anterior-posterior movement of the bolus

## 2022-04-19 NOTE — CHART NOTE - NSCHARTNOTEFT_GEN_A_CORE
EVENT: RN reporting POCT Blood Glucose of 35 mg/dl  POCT Blood Glucose.: 195 mg/dL (04-19-22 @ 00:26)  POCT Blood Glucose.: 212 mg/dL (04-18-22 @ 16:48)  POCT Blood Glucose.: 47 mg/dL (04-18-22 @ 15:46)  POCT Blood Glucose.: 53 mg/dL (04-18-22 @ 11:46)    BRIEF HPI: 89 yo F, from home, AAOx2-3 & ambulates w/ assistance at baseline (per prior records), PMHx of HTN, CVA, PE, Afib was sent for hypoxia.  Now nurse  reporting FS of 35 mg/dl.    OBJECTIVE:  Vital Signs Last 24 Hrs  T(C): 36.3 (19 Apr 2022 00:41), Max: 36.9 (18 Apr 2022 11:37)  T(F): 97.3 (19 Apr 2022 00:41), Max: 98.4 (18 Apr 2022 11:37)  HR: 94 (19 Apr 2022 00:41) (85 - 100)  BP: 184/79 (19 Apr 2022 00:41) (110/62 - 184/79)  BP(mean): --  RR: 18 (19 Apr 2022 00:41) (16 - 18)  SpO2: 100% (19 Apr 2022 00:41) (96% - 100%)    FOCUSED PHYSICAL EXAM:  ABD: Rounded, soft, non-tender  RESP: Even, unlabored  CV: S1 S2, irregular  LABS:                        11.6   10.67 )-----------( 308      ( 18 Apr 2022 12:28 )             36.7     04-18    148<H>  |  113<H>  |  24<H>  ----------------------------<  129<H>  3.6   |  27  |  0.70    Ca    9.0      18 Apr 2022 12:28    TPro  6.6  /  Alb  2.8<L>  /  TBili  0.3  /  DBili  x   /  AST  15  /  ALT  16  /  AlkPhos  87  04-18        IMAGING:  ACC: [id number] EXAM:  XR CHEST PORTABLE IMMED 1V                        PROCEDURE DATE:  04/18/2022    INTERPRETATION:  AP chest on April 18, 2022 at 3:11 PM. Patient has   sepsis.  Heart magnified by technique.  Lungs remain clear.  Chest is similar to March 14 of this year.  IMPRESSION: Negative chest.    PROBLEM: Bladder retention probably due to  PLAN:   1. Cont dextrose 5%. 1000 milliliter(s) (75 mL/Hr) IV Continuous     FOLLOW UP / RESULT: EVENT: RN reporting POCT Blood Glucose of 35 mg/dl  POCT Blood Glucose.: 195 mg/dL (04-19-22 @ 00:26) s/p PRN D50.  POCT Blood Glucose.: 212 mg/dL (04-18-22 @ 16:48)  POCT Blood Glucose.: 47 mg/dL (04-18-22 @ 15:46)  POCT Blood Glucose.: 53 mg/dL (04-18-22 @ 11:46)    BRIEF HPI: 89 yo F, from home, AAOx2-3 & ambulates w/ assistance at baseline (per prior records), PMHx of HTN, CVA, PE, Afib was sent for hypoxia.  Now nurse  reporting FS of 35 mg/dl.    OBJECTIVE:  Vital Signs Last 24 Hrs  T(C): 36.3 (19 Apr 2022 00:41), Max: 36.9 (18 Apr 2022 11:37)  T(F): 97.3 (19 Apr 2022 00:41), Max: 98.4 (18 Apr 2022 11:37)  HR: 94 (19 Apr 2022 00:41) (85 - 100)  BP: 184/79 (19 Apr 2022 00:41) (110/62 - 184/79)  BP(mean): --  RR: 18 (19 Apr 2022 00:41) (16 - 18)  SpO2: 100% (19 Apr 2022 00:41) (96% - 100%)    FOCUSED PHYSICAL EXAM:  ABD: Rounded, soft, non-tender  RESP: Even, unlabored  CV: S1 S2, irregular  LABS:                        11.6   10.67 )-----------( 308      ( 18 Apr 2022 12:28 )             36.7     04-18    148<H>  |  113<H>  |  24<H>  ----------------------------<  129<H>  3.6   |  27  |  0.70    Ca    9.0      18 Apr 2022 12:28    TPro  6.6  /  Alb  2.8<L>  /  TBili  0.3  /  DBili  x   /  AST  15  /  ALT  16  /  AlkPhos  87  04-18    IMAGING:  ACC: [id number] EXAM:  XR CHEST PORTABLE IMMED 1V                        PROCEDURE DATE:  04/18/2022    INTERPRETATION:  AP chest on April 18, 2022 at 3:11 PM. Patient has   sepsis.  Heart magnified by technique.  Lungs remain clear.  Chest is similar to March 14 of this year.  IMPRESSION: Negative chest.    PROBLEM: Bladder retention probably due to  PLAN:   1. Cont dextrose 5%. 1000 milliliter(s) (75 mL/Hr) IV Continuous   2. Tamsulosin 0.4 dewayne GRAM(s) Oral at bedtime and now  3. Land cath, TOV when appropriate     FOLLOW UP / RESULT: effectiveness of med EVENT: RN reporting POCT Blood Glucose of 35 mg/dl  POCT Blood Glucose.: 195 mg/dL (04-19-22 @ 00:26) s/p PRN D50.  POCT Blood Glucose.: 212 mg/dL (04-18-22 @ 16:48)  POCT Blood Glucose.: 47 mg/dL (04-18-22 @ 15:46)  POCT Blood Glucose.: 53 mg/dL (04-18-22 @ 11:46)    BRIEF HPI: 89 yo F, from home, AAOx2-3 & ambulates w/ assistance at baseline (per prior records), PMHx of HTN, CVA, PE, Afib was sent for hypoxia.  Now nurse  reporting FS of 35 mg/dl, bladder scan 475 ml.    OBJECTIVE:  Vital Signs Last 24 Hrs  T(C): 36.3 (19 Apr 2022 00:41), Max: 36.9 (18 Apr 2022 11:37)  T(F): 97.3 (19 Apr 2022 00:41), Max: 98.4 (18 Apr 2022 11:37)  HR: 94 (19 Apr 2022 00:41) (85 - 100)  BP: 184/79 (19 Apr 2022 00:41) (110/62 - 184/79)  BP(mean): --  RR: 18 (19 Apr 2022 00:41) (16 - 18)  SpO2: 100% (19 Apr 2022 00:41) (96% - 100%)    FOCUSED PHYSICAL EXAM:  ABD: Rounded, soft, non-tender  RESP: Even, unlabored  CV: S1 S2, irregular  LABS:                        11.6   10.67 )-----------( 308      ( 18 Apr 2022 12:28 )             36.7     04-18    148<H>  |  113<H>  |  24<H>  ----------------------------<  129<H>  3.6   |  27  |  0.70    Ca    9.0      18 Apr 2022 12:28    TPro  6.6  /  Alb  2.8<L>  /  TBili  0.3  /  DBili  x   /  AST  15  /  ALT  16  /  AlkPhos  87  04-18    IMAGING:  ACC: [id number] EXAM:  XR CHEST PORTABLE IMMED 1V                        PROCEDURE DATE:  04/18/2022    INTERPRETATION:  AP chest on April 18, 2022 at 3:11 PM. Patient has   sepsis.  Heart magnified by technique.  Lungs remain clear.  Chest is similar to March 14 of this year.  IMPRESSION: Negative chest.    PROBLEM: Bladder retention probably due to  PLAN:   1. Cont dextrose 5%. 1000 milliliter(s) (75 mL/Hr) IV Continuous   2. Tamsulosin 0.4 dewayne GRAM(s) Oral at bedtime and now  3. Land cath, TOV when appropriate     FOLLOW UP / RESULT: effectiveness of med EVENT: RN reporting POCT Blood Glucose of 35 mg/dl  POCT Blood Glucose.: 195 mg/dL (04-19-22 @ 00:26) s/p PRN D50.  POCT Blood Glucose.: 212 mg/dL (04-18-22 @ 16:48)  POCT Blood Glucose.: 47 mg/dL (04-18-22 @ 15:46)  POCT Blood Glucose.: 53 mg/dL (04-18-22 @ 11:46)    BRIEF HPI: 89 yo F, from home, AAOx2-3 & ambulates w/ assistance at baseline (per prior records), PMHx of HTN, CVA, PE, Afib was sent for hypoxia. Now nurse  reporting FS of 35 mg/dl, bladder scan 475 ml.    OBJECTIVE:  Vital Signs Last 24 Hrs  T(C): 36.3 (19 Apr 2022 00:41), Max: 36.9 (18 Apr 2022 11:37)  T(F): 97.3 (19 Apr 2022 00:41), Max: 98.4 (18 Apr 2022 11:37)  HR: 94 (19 Apr 2022 00:41) (85 - 100)  BP: 184/79 (19 Apr 2022 00:41) (110/62 - 184/79)  BP(mean): --  RR: 18 (19 Apr 2022 00:41) (16 - 18)  SpO2: 100% (19 Apr 2022 00:41) (96% - 100%)    FOCUSED PHYSICAL EXAM:  ABD: Flat, soft, non-tender  RESP: Even, unlabored  CV: S1 S2, irregular  LABS:                        11.6   10.67 )-----------( 308      ( 18 Apr 2022 12:28 )             36.7     04-18    148<H>  |  113<H>  |  24<H>  ----------------------------<  129<H>  3.6   |  27  |  0.70    Ca    9.0      18 Apr 2022 12:28    TPro  6.6  /  Alb  2.8<L>  /  TBili  0.3  /  DBili  x   /  AST  15  /  ALT  16  /  AlkPhos  87  04-18    IMAGING:  ACC: [id number] EXAM:  XR CHEST PORTABLE IMMED 1V                        PROCEDURE DATE:  04/18/2022    INTERPRETATION:  AP chest on April 18, 2022 at 3:11 PM. Patient has   sepsis.  Heart magnified by technique.  Lungs remain clear.  Chest is similar to March 14 of this year.  IMPRESSION: Negative chest.    PROBLEM: Bladder retention probably due to  PLAN:   1. Cont dextrose 5%. 1000 milliliter(s) (75 mL/Hr) IV Continuous   2. Tamsulosin 0.4 dewayne GRAM(s) Oral at bedtime and now  3. Land cath, TOV when appropriate     FOLLOW UP / RESULT: effectiveness of med

## 2022-04-19 NOTE — PATIENT PROFILE ADULT - FALL HARM RISK - HARM RISK INTERVENTIONS
Assistance with ambulation/Assistance OOB with selected safe patient handling equipment/Communicate Risk of Fall with Harm to all staff/Monitor for mental status changes/Monitor gait and stability/Reinforce activity limits and safety measures with patient and family/Reorient to person, place and time as needed/Tailored Fall Risk Interventions/Use of alarms - bed, chair and/or voice tab/Visual Cue: Yellow wristband and red socks/Bed in lowest position, wheels locked, appropriate side rails in place/Call bell, personal items and telephone in reach/Instruct patient to call for assistance before getting out of bed or chair/Non-slip footwear when patient is out of bed/[place] to call system/Physically safe environment - no spills, clutter or unnecessary equipment/Purposeful Proactive Rounding/Room/bathroom lighting operational, light cord in reach

## 2022-04-19 NOTE — SWALLOW BEDSIDE ASSESSMENT ADULT - PHARYNGEAL PHASE
cued for repeat swallow/Delayed pharyngeal swallow/Decreased laryngeal elevation cued for repeat swallow/Delayed pharyngeal swallow/Decreased laryngeal elevation/Multiple swallows

## 2022-04-19 NOTE — ADVANCED PRACTICE NURSE CONSULT - RECOMMEDATIONS
-Clean all wounds with normal saline and apply skin prep to the surrounding skin  -Apply Collagenase ointment to the slough areas of the Coccyx wound, apply saline moistened gauze to the wound bed, and cover with a Foam dressing Daily PRN  -Elevate/float the patients heels using heel protectors and reposition the patient Q 2hrs using wedges or pillows

## 2022-04-19 NOTE — CHART NOTE - NSCHARTNOTESELECT_GEN_ALL_CORE
Nursw reporting POCT 35 mg/dl; bladder scan/Event Note Nurse reporting POCT 35 mg/dl; bladder scan  475 ml/Event Note

## 2022-04-19 NOTE — SWALLOW BEDSIDE ASSESSMENT ADULT - SLP PERTINENT HISTORY OF CURRENT PROBLEM
89 yo F, from home, AAOx2-3 & ambulates w/ assistance at baseline (per prior records), PMHx of HTN, CVA, PE, Afib was sent for hypoxia. In ED, Pt was AxO1, knew her , and aware to self. Pt did not know why she was in the hospital but denied any chest pain, sob, N/V/D. Per NH papers pt was hypoxic to 84% on RA and refusing to eat and drink for 5 days and on IV fluids. Admitted for FTT.

## 2022-04-20 DIAGNOSIS — F03.90 UNSPECIFIED DEMENTIA WITHOUT BEHAVIORAL DISTURBANCE: ICD-10-CM

## 2022-04-20 DIAGNOSIS — R53.2 FUNCTIONAL QUADRIPLEGIA: ICD-10-CM

## 2022-04-20 DIAGNOSIS — E43 UNSPECIFIED SEVERE PROTEIN-CALORIE MALNUTRITION: ICD-10-CM

## 2022-04-20 DIAGNOSIS — Z51.5 ENCOUNTER FOR PALLIATIVE CARE: ICD-10-CM

## 2022-04-20 LAB
APTT BLD: 50.2 SEC — HIGH (ref 27.5–35.5)
BUN SERPL-MCNC: 9 MG/DL — SIGNIFICANT CHANGE UP (ref 7–18)
CALCIUM SERPL-MCNC: 8.4 MG/DL — SIGNIFICANT CHANGE UP (ref 8.4–10.5)
CHLORIDE SERPL-SCNC: 108 MMOL/L — SIGNIFICANT CHANGE UP (ref 96–108)
CO2 SERPL-SCNC: 23 MMOL/L — SIGNIFICANT CHANGE UP (ref 22–31)
CREAT SERPL-MCNC: 0.51 MG/DL — SIGNIFICANT CHANGE UP (ref 0.5–1.3)
EGFR: 89 ML/MIN/1.73M2 — SIGNIFICANT CHANGE UP
GLUCOSE BLDC GLUCOMTR-MCNC: 113 MG/DL — HIGH (ref 70–99)
GLUCOSE BLDC GLUCOMTR-MCNC: 121 MG/DL — HIGH (ref 70–99)
GLUCOSE BLDC GLUCOMTR-MCNC: 134 MG/DL — HIGH (ref 70–99)
GLUCOSE SERPL-MCNC: 99 MG/DL — SIGNIFICANT CHANGE UP (ref 70–99)
HCT VFR BLD CALC: 27.3 % — LOW (ref 34.5–45)
HGB BLD-MCNC: 9.1 G/DL — LOW (ref 11.5–15.5)
INR BLD: 6.33 RATIO — CRITICAL HIGH (ref 0.88–1.16)
MCHC RBC-ENTMCNC: 29.9 PG — SIGNIFICANT CHANGE UP (ref 27–34)
MCHC RBC-ENTMCNC: 33.3 GM/DL — SIGNIFICANT CHANGE UP (ref 32–36)
MCV RBC AUTO: 89.8 FL — SIGNIFICANT CHANGE UP (ref 80–100)
NRBC # BLD: 0 /100 WBCS — SIGNIFICANT CHANGE UP (ref 0–0)
PLATELET # BLD AUTO: 272 K/UL — SIGNIFICANT CHANGE UP (ref 150–400)
POTASSIUM SERPL-MCNC: 3.7 MMOL/L — SIGNIFICANT CHANGE UP (ref 3.5–5.3)
POTASSIUM SERPL-SCNC: 3.7 MMOL/L — SIGNIFICANT CHANGE UP (ref 3.5–5.3)
PROTHROM AB SERPL-ACNC: 76.7 SEC — HIGH (ref 10.5–13.4)
RBC # BLD: 3.04 M/UL — LOW (ref 3.8–5.2)
RBC # FLD: 15.5 % — HIGH (ref 10.3–14.5)
SODIUM SERPL-SCNC: 140 MMOL/L — SIGNIFICANT CHANGE UP (ref 135–145)
WBC # BLD: 10.33 K/UL — SIGNIFICANT CHANGE UP (ref 3.8–10.5)
WBC # FLD AUTO: 10.33 K/UL — SIGNIFICANT CHANGE UP (ref 3.8–10.5)

## 2022-04-20 PROCEDURE — 99223 1ST HOSP IP/OBS HIGH 75: CPT

## 2022-04-20 RX ORDER — SODIUM CHLORIDE 9 MG/ML
1000 INJECTION, SOLUTION INTRAVENOUS
Refills: 0 | Status: DISCONTINUED | OUTPATIENT
Start: 2022-04-20 | End: 2022-04-21

## 2022-04-20 RX ADMIN — TAMSULOSIN HYDROCHLORIDE 0.4 MILLIGRAM(S): 0.4 CAPSULE ORAL at 21:24

## 2022-04-20 RX ADMIN — CEFTRIAXONE 100 MILLIGRAM(S): 500 INJECTION, POWDER, FOR SOLUTION INTRAMUSCULAR; INTRAVENOUS at 10:42

## 2022-04-20 RX ADMIN — Medication 1 APPLICATION(S): at 12:38

## 2022-04-20 NOTE — CONSULT NOTE ADULT - NS ATTEND AMEND GEN_ALL_CORE FT
89 y F  with advancing dementia, recent adm for acute CVA, min verbal, nonambulatory, adm from [facility] for FTT, poor po intake. Hospice eligible. CT head no acute changes. SLP elizabeth noted, started on dysphagia diet. A&Ox1,  cachectic, NAD. DNR/DNI, son does not want feeding tubes, agreeable for LTC w hospice.

## 2022-04-20 NOTE — CONSULT NOTE ADULT - TIME BILLING
I have examined pt personally Hx chart lab and xrays reviewed and pt discussed with Er Staff and resident
- Review of records, telemetry, vital signs and daily labs.   - General and cardiovascular physical examination.  - Generation of cardiovascular treatment plan.  - Coordination of care.      Patient was seen and examined by me on 4/19/2022interim events noted,labs and radiology studies reviewed.  Stephon Garcia MD,FACC.  [address] [place], [place] [id number].  [phone]
discussed w primary team

## 2022-04-20 NOTE — PROGRESS NOTE ADULT - PROBLEM SELECTOR PLAN 4
-Na 147, likely due to dehydration, resolved now  - C/w IVF  - repleated K+ and phosphorus  - trend BMP

## 2022-04-20 NOTE — CONSULT NOTE ADULT - PROBLEM SELECTOR RECOMMENDATION 2
Clinical evidence indicates that the patient has Severe protein calorie malnutrition/ 3rd degree. Albumin 2.2. Noted with pocketing food .  Poor po intake prior to admission  In context of Chronic Illness (>1 month)    Energy/Food intake <50% of estimated energy requirement >5 days  Weight loss: Moderate   Body Fat loss: Severe   Cachexia, temporal wasting, muscle atrophy  Muscle mass loss: Severe  Skin failure   Strength: weakened severe bedbound    Recommend:   pleasure feeds as tolerated - aspiration precautions, careful hand-feeding, teaching to caregivers  nutritional supplements as tolerated, nutrition consult    No feeding tube

## 2022-04-20 NOTE — CONSULT NOTE ADULT - CONVERSATION DETAILS
Spoke with the pt's son via phone discussed her clinical status, poor po intake and further goals of care.  MOLST on file DNR/DNI/PEG.  Discussed the risks of tube feeding in pt with advanced dementia, which can to lead to harmful side effects including worsening pressure ulcers; vs careful hand feeing which offers better outcomes.  Educated him about the benefits of hospice philosophy in pt with advanced illness. .    Readdressed MOLST on file.  New MOLST drafted; DNR/DNI/no feeding tube/comfort measures/DNH.  No labs.   Family wants pt to be discharged back to [facility] for LTC with hospice.  SW referral made.  All questions answered.  Support provided.   Plan discussed with primary team

## 2022-04-20 NOTE — PROGRESS NOTE ADULT - PROBLEM SELECTOR PLAN 9
scd boot
- due to decreased PO intake  - Nutrition consult

## 2022-04-20 NOTE — PROGRESS NOTE ADULT - PROBLEM SELECTOR PLAN 10
scd boot
scd boot
- f/u speech and swallow  - f/u palliative recs  - f/u blood and urine cx  - spoke with son over phone and discussed regarding pt's condition and plan of care. all questions were answered.
scd boot

## 2022-04-20 NOTE — PROGRESS NOTE ADULT - PROBLEM SELECTOR PLAN 1
-Pt sent from NH for hypoxia 85% RA, now was saturating well on2L   -Chest x-ray without any abnormality   - Head CT no acute findings  -EKG NSR , trop neg   - blood cultures negative  - F/u urine   -Pulm Dr. Bond following
-Pt sent from NH for hypoxia 85% RA, now was saturating well on2L   -Chest x-ray without any abnormality   - Head CT no acute findings  -EKG NSR , trop neg   - F/u urine and blood cultures  -Pulm Dr. Bond following

## 2022-04-20 NOTE — PROGRESS NOTE ADULT - PROBLEM SELECTOR PLAN 3
- start ceftriaxone in the setting of uptrend leukocytosis  - f/u urine Culture   - trend cbc
- start ceftriaxone in the setting of uptrend leukocytosis  - f/u urine Culture and blood culture  - trend cbc

## 2022-04-20 NOTE — PROGRESS NOTE ADULT - PROBLEM SELECTOR PLAN 6
p/w hypoglycemia  Likely in the setting of poor po intake   C/w FS 4hr  HgbA1C 5.1   C/w IVF as pt is not eating
p/w hypoglycemia  Likely in the setting of poor po intake   C/w FS 4hr  HgbA1C 5.1   C/w IVF as pt is not eating
p/w hypogylycemia  Likely in the setting of poor po intake   C/w FS q4hr   HgbA1C 5.1   c/w IVF while NPO
p/w hypoglycemia  Likely in the setting of poor po intake   C/w FS 4hr  HgbA1C 5.1   C/w IVF as pt is not eating

## 2022-04-20 NOTE — PROGRESS NOTE ADULT - PROBLEM SELECTOR PLAN 2
-Pt poor PO intake for 6 days, was on IVF at the nursing home  - UA + nitrite and with many bacteria  - discussed with son, does not want to peruse peg at this time  -Cont home med escitalopram ( was started last admission)  - speech and swallow eval for failed dysphagia- appreciate recs, will start dysphagia diet  - Palliative consult for functional deconditioning
-Pt poor PO intake for 6 days, was on IVF at the nursing home  - UA + nitrite and with many bacteria  - discussed with son, does not want to peruse peg at this time  -Cont home med escitalopram ( was started last admission)  - speech and swallow eval for failed dysphagia  - Palliative consult for functional deconditioning

## 2022-04-20 NOTE — PROGRESS NOTE ADULT - PROBLEM SELECTOR PLAN 8
Controlled, On atenolol, Lasix and lisinopril,   Cont lisinopril and atenolol with parameters  Held Lasix for now

## 2022-04-20 NOTE — CONSULT NOTE ADULT - SUBJECTIVE AND OBJECTIVE BOX
[facility] Geriatric and Palliative Consult Service:  Dr. Paty Gurrola: cell ([phone])  Dr. Tena Vanegas: cell ([phone])   Anila Pierce NP: cell ([phone])  Madelin Laboy NP: cell ([phone])   Efraín Olmedonimco LSW: cell ([phone])     HPI:  89 yo F, from home, AAOx2-3 & ambulates w/ assistance at baseline (per prior records), PMHx of HTN, CVA, PE, Afib was sent for hypoxia. Pt assessed at bedside AxO1, knows her , and aware to self. Pt doesn't know why she is in the hospital but denied any chest pain, sob, N/V/D. Per NH papers pt was hypoxic to 84% on RA and refusing to eat and drink for 5 days and on IV fluids.     Of note pt recently admitted to [facility] for AMS and was diagnosed with acute /subacute infarct on MRI brain. Pt was started on full dose AC Coumadin given multiple strokes and hx of Afib (she was not on AC previously due to falls)     PAST MEDICAL & SURGICAL HISTORY:  HTN (hypertension)    Pulmonary embolism    Afib    Stroke      SOCIAL HISTORY:    Admitted from:  [facility]  Pt is , has 3 children     Jain:   [employer]                                 Preferred Language: English    FAMILY HISTORY:   unable to obtain from patient due to poor mentation  Baseline ADLs (prior to admission): bedbound    Allergies    No Known Allergies    Intolerances      Present Symptoms:    Unable to obtain due to poor mentation    MEDICATIONS  (STANDING):  ATENolol  Tablet 50 milliGRAM(s) Oral daily  cefTRIAXone   IVPB 1000 milliGRAM(s) IV Intermittent every 24 hours  collagenase Ointment 1 Application(s) Topical daily  dextrose 5% + sodium chloride 0.45%. 1000 milliLiter(s) (75 mL/Hr) IV Continuous <Continuous>  dextrose 5%. 1000 milliLiter(s) (75 mL/Hr) IV Continuous <Continuous>  escitalopram 5 milliGRAM(s) Oral daily  lisinopril 5 milliGRAM(s) Oral daily  tamsulosin 0.4 milliGRAM(s) Oral at bedtime    MEDICATIONS  (PRN):      PHYSICAL EXAM:  Vital Signs Last 24 Hrs  T(C): 36.8 (2022 12:01), Max: 36.8 (2022 12:01)  T(F): 98.2 (2022 12:01), Max: 98.2 (2022 12:01)  HR: 103 (2022 12:01) (95 - 103)  BP: 117/69 (2022 12:01) (109/73 - 118/65)  BP(mean): --  RR: 17 (2022 12:01) (16 - 17)  SpO2: 97% (2022 12:01) (97% - 97%)    General: Cachetic, elderly woman, Aox0-1 confused.  NAD    Palliative Performance Scale/Karnofsky Score: 40%  ECOG Performance: n/a    HEENT: temporal wasting, clear oropharynx, neck supple  Lungs: unlabored on RA  CV: RRR, S1S2  GI: soft non distended non tender on palpation  incontinent  : incontinent    Musculoskeletal: bedbound  Skin: Unstageable Pressure Injury to the Coccyx  Neuro: unable to follow commands  Oral intake ability: poor po intake     LABS:                        9.1    10.33 )-----------( 272      ( 2022 09:22 )             27.3     04-20    140  |  108  |  9   ----------------------------<  99  3.7   |  23  |  0.51    Ca    8.4      2022 09:22  Phos  1.8     04-19  Mg     1.7     04-19    TPro  5.8<L>  /  Alb  2.2<L>  /  TBili  0.5  /  DBili  x   /  AST  15  /  ALT  14  /  AlkPhos  81  04-19    Urinalysis Basic - ( 2022 03:59 )    Color: Yellow / Appearance: Clear / S.010 / pH: x  Gluc: x / Ketone: Negative  / Bili: Negative / Urobili: Negative   Blood: x / Protein: Negative / Nitrite: Positive   Leuk Esterase: Negative / RBC: 0-2 /HPF / WBC 3-5 /HPF   Sq Epi: x / Non Sq Epi: Few /HPF / Bacteria: Many /HPF      < from: Xray Chest 1 View-PORTABLE IMMEDIATE (22 @ 16:11) >  ACC: [id number] EXAM:  XR CHEST PORTABLE IMMED 1V                          PROCEDURE DATE:  2022          INTERPRETATION:  AP chest on 2022 at 3:11 PM. Patient has   sepsis.    Heart magnified by technique.    Lungs remain clear.    Chest is similar to  of this year.    IMPRESSION: Negative chest.    < end of copied text >  < from: CT Head No Cont (22 @ 19:27) >    ACC: [id number] EXAM:  CT BRAIN                          PROCEDURE DATE:  2022          INTERPRETATION:  Clinical Indication: Failure to thrive    5mm axial sections of the brain were obtained from base to vertex,   without the intravenous administration of contrast material. Coronal and   sagittal computer generated reconstructed views are available.    Comparison is made with prior CT of 3/8/2022 and demonstrates no   significant interval change. Moderate atrophy is identified with   ventricular and sulcal prominence. Fairly extensive small vessel white   matter ischemic changes are noted. There is no hemorrhage. Bone window   examination is unremarkable. There has been previous right-sided lens   replacement surgery.        IMPRESSION: No change from 3/8/2022. Moderate atrophy and small vessel   white matter ischemic changes. No hemorrhage.    < end of copied text >    RADIOLOGY & ADDITIONAL STUDIES: reviewed  ADVANCED DIRECTIVES:  DNR/DNI    
PULMONARY CONSULT NOTE      KWAME RODRIGUEZ  MRN-[id number]    Patient is a 89y old  Female who presents with a chief complaint of Failure to thrive (2022 15:12)  with low o2 sat which was ok in ER  No sob or chest pain  Hx chart lab and xrays  reviewed  Pt examined    HISTORY OF PRESENT ILLNESS:89 yo F, from home, AAOx2-3 & ambulates w/ assistance at baseline (per prior records), PMHx of HTN, CVA, PE, Afib was sent for hypoxia. Pt assessed at bedside AxO1, knows her , and aware to self. Pt doesn't know why she is in the hospital but denied any chest pain, sob, N/V/D. Per NH papers pt was hypoxic to 84% on RA and refusing to eat and drink for 5 days and on IV fluids.     Of note pt recently admitted to [facility] for AMS and was diagnosed with acute /subacute infarct on MRI brain. Pt was started on full dose AC Coumadin given multiple strokes and hx of Afib (she was not on AC previously due to falls)     ED course: Chest x-ray without any abnormality . Na 147, Lactate 2.1, FS 53, ProBNP , EKG NSR.     MAR assessed pt's O2 saturation on left hand finger(non cyanotic finger) and also confirmed her O2 sat with pulse ox on forehead and both time pt was 100% on RA    Home Medications:  atenolol 50 mg oral tablet: 1 tab(s) orally once a day (2022 14:59)  cyproheptadine 4 mg oral tablet: 1 tab(s) orally 2 times a day for 28 days (2022 14:59)  Lasix 20 mg oral tablet: 1 tab(s) orally once a day (2022 14:59)  Lexapro 5 mg oral tablet: 1 tab(s) orally once a day (2022 14:59)  lisinopril 5 mg oral tablet: 1 tab(s) orally once a day (2022 14:59)  Multiple Vitamins oral tablet: 1 tab(s) orally once a day (2022 14:59)  Tylenol 325 mg oral tablet: 2 tab(s) orally every 6 hours (2022 14:59)  Vitamin C 500 mg oral tablet: 1 tab(s) orally once a day (2022 14:59)  warfarin 4 mg oral tablet: 1 tab(s) orally once a day (2022 14:59)  zinc (as gluconate) 50 mg oral tablet: 1 tab(s) orally 3 times a week Mon, Wed, Fri at 9am  (2022 14:59)      MEDICATIONS  (STANDING):  ATENolol  Tablet 50 milliGRAM(s) Oral daily  dextrose 5%. 1000 milliLiter(s) (75 mL/Hr) IV Continuous <Continuous>  dextrose 50% Injectable 50 milliLiter(s) IV Push once  enoxaparin Injectable 40 milliGRAM(s) SubCutaneous every 12 hours  escitalopram 5 milliGRAM(s) Oral daily  lisinopril 5 milliGRAM(s) Oral daily          Allergies    No Known Allergies          PAST MEDICAL & SURGICAL HISTORY:  HTN (hypertension)  Pulmonary embolism  Afib  Stroke  Admitted [facility] 3/22      FAMILY HISTORY:  HTN--    DM--   IHD--   Asthma -- COPD --    SOCIAL HISTORY SMOKING--    ETOH--     DRUGS--  NH resident      REVIEW OF SYSTEMS: as per staff  CONSTITUTIONAL: No fever, weight loss+  EYES: No eye pain, visual disturbances, or discharge  ENT:  No difficulty hearing, tinnitus, vertigo; No sinus or throat pain  NECK: No pain or stiffness   RESPIRATORY:  cough--  wheezing--   chills--   hemoptysis--  Shortness of Breath--  CARDIOVASCULAR: No chest pain, palpitations, passing out, dizziness, or leg swelling  GASTROINTESTINAL: No abdominal or epigastric pain. No nausea, vomiting,  GENITOURINARY: No dysuria, frequency, hematuria, or incontinence  NEUROLOGICAL: No headaches, memory loss++,  loss of strength++  SKIN: No itching, burning, rashes, or lesions   LYMPH Nodes: No enlarged glands  ENDOCRINE: No heat or cold intolerance; No hair loss      Vital Signs Last 24 Hrs  T(C): 36.6 (2022 15:30), Max: 36.9 (2022 11:37)  T(F): 97.9 (2022 15:30), Max: 98.4 (2022 11:37)  HR: 90 (2022 16:53) (85 - 97)  BP: 110/62 (2022 16:53) (110/62 - 172/99)  BP(mean): --  RR: 16 (2022 16:53) (16 - 18)  SpO2: 96% (2022 16:53) (96% - 97%)  I&O's Detail      PHYSICAL EXAMINATION:    GENERAL: The patient is a thin w/f  in no apparent distress.   SKIN: No rashes ecchymoses or cyanosis  HEENT: Head is normocephalic and atraumatic. Extraocular muscles are intact. Mucous membranes are moist.   Neck supple LN not felt, JVP not increased  Thyroid not enlarged  Lymphatic: No lymphadenopathy  Cardiovascular:  S1 S2  heard ,RSR , JVP not increased , systolic  murmur at apex, No  gallop or rub  Respiratory:  Symmetrical chest wall movements Breathing vesicular , Percussion note normal no dulness   with no  rales or  wheeze  ABDOMEN:  Soft, Non-tender, No  hepatosplenomegaly ,BS positive		  Extremities: No clubbing, cyanosis or edema , No calf tenderness  Vascular: Peripheral pulses palpable 2+ bilaterally  CNS: Alert and responsive  Non Communicative  Lt Hemiplegia  Babinski withdrawl        LABS:                        11.6   10.67 )-----------( 308      ( 2022 12:28 )             36.7         148<H>  |  113<H>  |  24<H>  ----------------------------<  129<H>  3.6   |  27  |  0.70    Ca    9.0      2022 12:28    TPro  6.6  /  Alb  2.8<L>  /  TBili  0.3  /  DBili  x   /  AST  15  /  ALT  16  /  AlkPhos  87      PT/INR - ( 2022 12:28 )   PT: 64.0 sec;   INR: 5.29 ratio         PTT - ( 2022 12:28 )  PTT:46.7 sec          Troponin I, High Sensitivity Result: 41.4 ng/L (22 @ 12:28)      Serum Pro-Brain Natriuretic Peptide: 2042 pg/mL (22 @ 12:28)    Lactate, Blood: 2.2 mmol/L (22 @ 16:30)  Lactate, Blood: 2.1 mmol/L (22 @ 12:28)        RADIOLOGY & ADDITIONAL STUDIES:    CXR: [-- No infiltrate, Small heart      ekg; NSR, Q inf leads    echo:< from: Transthoracic Echocardiogram (22 @ 07:25) >  1. Normal mitral valve.  2. Aortic valve not well visualized. Peak transaortic valve  gradient equals 18.7 mm Hg, mean transaortic valve gradient  equals 12 mm Hg, estimated aorticvalve area equals 1.7  sqcm (by continuity equation), consistent with mild aortic  stenosis.  3. Aortic Root: 2.7 cm.    4. Normal left atrium.  LA volume index = 17 cc/m2.  5. Normal left ventricular internal dimensions and wall  thicknesses.  6. Normal Left Ventricular Systolic Function,  (EF = 55 to  60%)  7. Grade I diastolic dysfunction (Impaired relaxation,  mild).  8. Normal right atrium.  9. Normal right ventricular size and function (RV tissue  Doppler .12m/s).  10. RV systolic pressure is mildly increased at  37 mm Hg.  11. Normal tricuspid valve.  12. Pulmonic valve not well seen.  13. Normal pericardium with no pericardial effusion.    CT Head- < from: CT Head No Cont (22 @ 13:11) >  Parenchymal volume loss and chronic microvascular ischemic changes are   identified.    Old lacunar infarct involving the left basal ganglia/anterior corona   radiata region.    There is no evidence acute hemorrhage mass or mass effect seen.    Evaluation of the osseous structures with appropriate window appears   unremarkable    The visualized paranasal sinuses mastoid and middle ear regions appear   clear.    < end of copied text >  
PATIENT SEEN AND EXAMINED ON :- 2022  DATE OF SERVICE:             Interim events noted,Labs ,Radiological studies and Cardiology tests reviewed .       HOSPITAL COURSE: HPI:  89 yo F, from home, AAOx2-3 & ambulates w/ assistance at baseline (per prior records), PMHx of HTN, CVA, PE, Afib was sent for hypoxia. Pt assessed at bedside AxO1, knows her , and aware to self. Pt doesn't know why she is in the hospital but denied any chest pain, sob, N/V/D. Per NH papers pt was hypoxic to 84% on RA and refusing to eat and drink for 5 days and on IV fluids.     Of note pt recently admitted to [facility] for AMS and was diagnosed with acute /subacute infarct on MRI brain. Pt was started on full dose AC Coumadin given multiple strokes and hx of Afib (she was not on AC previously due to falls)     ED course: Chest x-ray without any abnormality . Na 147, Lactate 2.1, FS 53, ProBNP , EKG NSR.     MAR assessed pt's O2 saturation on left hand finger(non cyanotic finger) and also confirmed her O2 sat with pulse ox on forehead and both time pt was 100% on RA (2022 15:12)      INTERIM EVENTS:Patient seen at bedside ,interim events noted.      PMH -reviewed admission note, no change since admission  HEART FAILURE: Acute[ ]Chronic[ ] Systolic[ ] Diastolic[ ] Combined Systolic and Diastolic[ ]  CAD[ ] CABG[ ] PCI[ ]  DEVICES[ ] PPM[ ] ICD[ ] ILR[ ]  ATRIAL FIBRILLATION[ ] Paroxysmal[ ] Permanent[ ]  MAN[ ] CKD1[ ] CKD2[ ] CKD3[ ] CKD4[ ] ESRD[ ]  COPD[ ] HTN[ ]   DM[ ] Type1[ ] Type 2[ ]   CVA[ ] Paresis[ ]    AMBULATION: Assisted[ ] Cane/walker[ ] Independent[ ]    MEDICATIONS  (STANDING):  ATENolol  Tablet 50 milliGRAM(s) Oral daily  cefTRIAXone   IVPB 1000 milliGRAM(s) IV Intermittent every 24 hours  collagenase Ointment 1 Application(s) Topical daily  dextrose 5% + sodium chloride 0.45%. 1000 milliLiter(s) (75 mL/Hr) IV Continuous <Continuous>  dextrose 5%. 1000 milliLiter(s) (75 mL/Hr) IV Continuous <Continuous>  escitalopram 5 milliGRAM(s) Oral daily  lisinopril 5 milliGRAM(s) Oral daily  tamsulosin 0.4 milliGRAM(s) Oral at bedtime    MEDICATIONS  (PRN):            REVIEW OF SYSTEMS:  Constitutional: [ ] fever, [ ]weight loss,  [ ]fatigue  Eyes: [ ] visual changes  Respiratory: [ ]shortness of breath;  [ ] cough, [ ]wheezing, [ ]chills, [ ]hemoptysis  Cardiovascular: [ ] chest pain, [ ]palpitations, [ ]dizziness,  [ ]leg swelling[ ]orthopnea[ ]PND  Gastrointestinal: [ ] abdominal pain, [ ]nausea, [ ]vomiting,  [ ]diarrhea [ ]Constipation [ ]Melena  Genitourinary: [ ] dysuria, [ ] hematuria [ ]Land  Neurologic: [ ] headaches [ ] tremors[ ]weakness [ ]Paralysis Right[ ] Left[ ]  Skin: [ ] itching, [ ]burning, [ ] rashes  Endocrine: [ ] heat or cold intolerance  Musculoskeletal: [ ] joint pain or swelling; [ ] muscle, back, or extremity pain  Psychiatric: [ ] depression, [ ]anxiety, [ ]mood swings, or [ ]difficulty sleeping  Hematologic: [ ] easy bruising, [ ] bleeding gums    [ ] All remaining systems negative except as per above.   [ ]Unable to obtain.  [x] No change in ROS since admission      Vital Signs Last 24 Hrs  T(C): 36.4 (2022 13:09), Max: 36.4 (2022 05:39)  T(F): 97.5 (2022 13:09), Max: 97.5 (2022 05:39)  HR: 94 (2022 13:09) (94 - 104)  BP: 103/64 (2022 13:09) (103/64 - 184/79)  BP(mean): --  RR: 18 (2022 13:09) (17 - 18)  SpO2: 100% (2022 13:09) (98% - 100%)  I&O's Summary    2022 07:01  -  2022 21:37  --------------------------------------------------------  IN: 0 mL / OUT: 150 mL / NET: -150 mL        PHYSICAL EXAM:  General: No acute distress BMI-  HEENT: EOMI, PERRL  Neck: Supple, [ ] JVD  Lungs: Equal air entry bilaterally; [ ] rales [ ] wheezing [ ] rhonchi  Heart: Regular rate and rhythm; [x ] murmur   2/6 [ x] systolic [ ] diastolic [ ] radiation[ ] rubs [ ]  gallops  Abdomen: Nontender, bowel sounds present  Extremities: No clubbing, cyanosis, [ ] edema [ ]Pulses  equal and intact  Nervous system:  Alert & Oriented X3, no focal deficits  Psychiatric: Normal affect  Skin: No rashes or lesions    LABS:      140  |  105  |  14  ----------------------------<  96  3.0<L>   |  25  |  0.52    Ca    8.4      2022 09:11  Phos  1.8       Mg     1.7         TPro  5.8<L>  /  Alb  2.2<L>  /  TBili  0.5  /  DBili  x   /  AST  15  /  ALT  14  /  AlkPhos  81      Creatinine Trend: 0.52<--, 0.70<--                        10.3   12.16 )-----------( 317      ( 2022 09:11 )             31.4     PT/INR - ( 2022 09:50 )   PT: 69.0 sec;   INR: 5.70 ratio         PTT - ( 2022 09:50 )  PTT:56.2 sec    Serum Pro-Brain Natriuretic Peptide: 2042 pg/mL (22 @ 12:28)

## 2022-04-20 NOTE — CONSULT NOTE ADULT - PROBLEM SELECTOR RECOMMENDATION 9
Advanced Alzheimer's  dementia. Min verbal, bedbound. FAST 7d. Hospice eligible  Addressed dementia trajectory with the pt's son, Margie  Discussed hospice philosophy and locations of care.     Pt's son agreed for pt to be discharged back to [facility] for LTC with hospice.  [facility] referral made.    New NUNU DNR/DNI/no feeding tube/comfort measures/DNH

## 2022-04-20 NOTE — CONSULT NOTE ADULT - PROBLEM SELECTOR RECOMMENDATION 3
2/2 advanced dementia, bedbound.  Dependent on all care.   Wound care  Frequent positioning    Comfort only

## 2022-04-20 NOTE — PROGRESS NOTE ADULT - PROBLEM SELECTOR PLAN 5
INR  5.7 today  No active bleeding  Will hold coumadin for now  trend INR

## 2022-04-20 NOTE — CONSULT NOTE ADULT - CONSULT REASON
Discuss complex medical decision making related to goals of care due advanced dementia
hypoxia
afib, supratherapeutic INR

## 2022-04-20 NOTE — PROGRESS NOTE ADULT - PROBLEM SELECTOR PLAN 11
- C/w assist with feeds   - positioning  -  supportive care

## 2022-04-20 NOTE — PROGRESS NOTE ADULT - PROBLEM SELECTOR PROBLEM 9
Severe protein-calorie malnutrition
DVT prophylaxis
Severe protein-calorie malnutrition
Severe protein-calorie malnutrition

## 2022-04-20 NOTE — PROGRESS NOTE ADULT - PROBLEM SELECTOR PLAN 7
PAF, admission ekg NSR, On atenolol and coumadin  Held coumadin for supra therapeutic INR   C/w  atenolol   trend INR

## 2022-04-20 NOTE — PROGRESS NOTE ADULT - PROBLEM SELECTOR PLAN 12
- f/u palliative recs  - f/u urine cx  - poor prognosis - pt son wants pt back to [facility] with LTC hospice.  - pending auth

## 2022-04-21 ENCOUNTER — TRANSCRIPTION ENCOUNTER (OUTPATIENT)
Age: 87
End: 2022-04-21

## 2022-04-21 VITALS
DIASTOLIC BLOOD PRESSURE: 75 MMHG | SYSTOLIC BLOOD PRESSURE: 114 MMHG | TEMPERATURE: 97 F | HEART RATE: 51 BPM | OXYGEN SATURATION: 96 % | RESPIRATION RATE: 17 BRPM

## 2022-04-21 PROCEDURE — 36415 COLL VENOUS BLD VENIPUNCTURE: CPT

## 2022-04-21 PROCEDURE — 87086 URINE CULTURE/COLONY COUNT: CPT

## 2022-04-21 PROCEDURE — 84145 PROCALCITONIN (PCT): CPT

## 2022-04-21 PROCEDURE — 87635 SARS-COV-2 COVID-19 AMP PRB: CPT

## 2022-04-21 PROCEDURE — 87641 MR-STAPH DNA AMP PROBE: CPT

## 2022-04-21 PROCEDURE — 82962 GLUCOSE BLOOD TEST: CPT

## 2022-04-21 PROCEDURE — 84484 ASSAY OF TROPONIN QUANT: CPT

## 2022-04-21 PROCEDURE — 84100 ASSAY OF PHOSPHORUS: CPT

## 2022-04-21 PROCEDURE — 86140 C-REACTIVE PROTEIN: CPT

## 2022-04-21 PROCEDURE — 96374 THER/PROPH/DIAG INJ IV PUSH: CPT

## 2022-04-21 PROCEDURE — 87040 BLOOD CULTURE FOR BACTERIA: CPT

## 2022-04-21 PROCEDURE — 80048 BASIC METABOLIC PNL TOTAL CA: CPT

## 2022-04-21 PROCEDURE — 71045 X-RAY EXAM CHEST 1 VIEW: CPT

## 2022-04-21 PROCEDURE — 87186 SC STD MICRODIL/AGAR DIL: CPT

## 2022-04-21 PROCEDURE — 80053 COMPREHEN METABOLIC PANEL: CPT

## 2022-04-21 PROCEDURE — 99285 EMERGENCY DEPT VISIT HI MDM: CPT

## 2022-04-21 PROCEDURE — 85025 COMPLETE CBC W/AUTO DIFF WBC: CPT

## 2022-04-21 PROCEDURE — 85610 PROTHROMBIN TIME: CPT

## 2022-04-21 PROCEDURE — 83735 ASSAY OF MAGNESIUM: CPT

## 2022-04-21 PROCEDURE — 83880 ASSAY OF NATRIURETIC PEPTIDE: CPT

## 2022-04-21 PROCEDURE — 92610 EVALUATE SWALLOWING FUNCTION: CPT

## 2022-04-21 PROCEDURE — 85730 THROMBOPLASTIN TIME PARTIAL: CPT

## 2022-04-21 PROCEDURE — 81001 URINALYSIS AUTO W/SCOPE: CPT

## 2022-04-21 PROCEDURE — 85027 COMPLETE CBC AUTOMATED: CPT

## 2022-04-21 PROCEDURE — 93005 ELECTROCARDIOGRAM TRACING: CPT

## 2022-04-21 PROCEDURE — 70450 CT HEAD/BRAIN W/O DYE: CPT

## 2022-04-21 PROCEDURE — 85652 RBC SED RATE AUTOMATED: CPT

## 2022-04-21 PROCEDURE — 83605 ASSAY OF LACTIC ACID: CPT

## 2022-04-21 PROCEDURE — 87640 STAPH A DNA AMP PROBE: CPT

## 2022-04-21 RX ORDER — ASCORBIC ACID 60 MG
1 TABLET,CHEWABLE ORAL
Qty: 0 | Refills: 0 | DISCHARGE

## 2022-04-21 RX ORDER — CYPROHEPTADINE HYDROCHLORIDE 4 MG/1
1 TABLET ORAL
Qty: 0 | Refills: 0 | DISCHARGE

## 2022-04-21 RX ORDER — COLLAGENASE CLOSTRIDIUM HIST. 250 UNIT/G
1 OINTMENT (GRAM) TOPICAL
Qty: 0 | Refills: 0 | DISCHARGE
Start: 2022-04-21

## 2022-04-21 RX ORDER — WARFARIN SODIUM 2.5 MG/1
1 TABLET ORAL
Qty: 0 | Refills: 0 | DISCHARGE

## 2022-04-21 RX ORDER — ZINC GLUCONATE 30 MG
1 TABLET ORAL
Qty: 0 | Refills: 0 | DISCHARGE

## 2022-04-21 RX ADMIN — ESCITALOPRAM OXALATE 5 MILLIGRAM(S): 10 TABLET, FILM COATED ORAL at 11:54

## 2022-04-21 RX ADMIN — CEFTRIAXONE 100 MILLIGRAM(S): 500 INJECTION, POWDER, FOR SOLUTION INTRAMUSCULAR; INTRAVENOUS at 09:52

## 2022-04-21 RX ADMIN — Medication 1 APPLICATION(S): at 11:55

## 2022-04-21 NOTE — DISCHARGE NOTE NURSING/CASE MANAGEMENT/SOCIAL WORK - NSDCPEFALRISK_GEN_ALL_CORE
For information on Fall & Injury Prevention, visit: [email or web address] OR  [email or web address] OR  https://www.cdc.gov/steadi/patient.html

## 2022-04-21 NOTE — PROGRESS NOTE ADULT - SUBJECTIVE AND OBJECTIVE BOX
PULMONARY  progress note    KWAME RODRIGUEZ  [id number]    Patient is a 89y old  Female who presents with a chief complaint of Failure to thrive (20 Apr 2022 16:49)  Pt more responsive, UTI +      MEDICATIONS  (STANDING):  ATENolol  Tablet 50 milliGRAM(s) Oral daily  collagenase Ointment 1 Application(s) Topical daily  dextrose 5% + sodium chloride 0.45%. 1000 milliLiter(s) (65 mL/Hr) IV Continuous <Continuous>  dextrose 5% + sodium chloride 0.45%. 1000 milliLiter(s) (75 mL/Hr) IV Continuous <Continuous>  dextrose 5%. 1000 milliLiter(s) (75 mL/Hr) IV Continuous <Continuous>  escitalopram 5 milliGRAM(s) Oral daily  lisinopril 5 milliGRAM(s) Oral daily  tamsulosin 0.4 milliGRAM(s) Oral at bedtime               Vital Signs Last 24 Hrs  T(C): 36.1 (21 Apr 2022 05:18), Max: 37.1 (20 Apr 2022 21:19)  T(F): 97 (21 Apr 2022 05:18), Max: 98.7 (20 Apr 2022 21:19)  HR: 102 (21 Apr 2022 05:18) (99 - 103)  BP: 108/68 (21 Apr 2022 05:18) (108/68 - 117/69)  BP(mean): --  RR: 17 (21 Apr 2022 05:18) (17 - 18)  SpO2: 99% (21 Apr 2022 05:18) (95% - 99%)  I&O's Detail    20 Apr 2022 07:01  -  21 Apr 2022 07:00  --------------------------------------------------------  IN:  Total IN: 0 mL    OUT:    Indwelling Catheter - Urethral (mL): 600 mL  Total OUT: 600 mL    Total NET: -600 mL      21 Apr 2022 07:01  -  21 Apr 2022 11:12  --------------------------------------------------------  IN:  Total IN: 0 mL    OUT:    Indwelling Catheter - Urethral (mL): 300 mL  Total OUT: 300 mL    Total NET: -300 mL          PHYSICAL EXAMINATION:    GENERAL: The patient is a thin w/f in no distress  SKIN no rash ecchymoses or bruises  HEENT: Head is normocephalic and atraumatic  SOY , Extraocular muscles are intact. Mucous membranes  moist.   Neck supple ,No LN felt JVP not increased  Thyroid not enlarged  Cardiovascular:  S1 S2 heard, RSR, No JVD , systolic  murmur at apex, No gallop or rub  Respiratory: Chest wall symmetrical with good air entry ,Percussion note normal,    Lungs vesicular breathing with no   rales or   wheeze	  ABDOMEN:  Soft, Non-tender,  no hepatomegaly or splenomegaly BS positive	  Extremities: Normal range of motion, No clubbing, cyanosis or edema  Vascular: Peripheral pulses palpable 2+ bilaterally  CNS:  Awake and responsive   Babinski neg      LABS:                        9.1    10.33 )-----------( 272      ( 20 Apr 2022 09:22 )             27.3     04-20    140  |  108  |  9   ----------------------------<  99  3.7   |  23  |  0.51    Ca    8.4      20 Apr 2022 09:22      PT/INR - ( 20 Apr 2022 09:22 )   PT: 76.7 sec;   INR: 6.33 ratio         PTT - ( 20 Apr 2022 09:22 )  PTT:50.2 sec              Serum Pro-Brain Natriuretic Peptide: 2042 pg/mL (04-18-22 @ 12:28)      Procalcitonin, Serum: 0.10 ng/mL (04-19-22 @ 12:58)      MICROBIOLOGY:    Culture - Urine (collected 04-19-22 @ 06:00)  Source: Clean Catch Clean Catch (Midstream)  Final Report (04-21-22 @ 09:59):    >100,000 CFU/ml Escherichia coli  Organism: Escherichia coli (04-21-22 @ 09:59)  Organism: Escherichia coli (04-21-22 @ 09:59)      -  Amikacin: S <=16      -  Amoxicillin/Clavulanic Acid: S <=8/4      -  Ampicillin: S <=8 These ampicillin results predict results for amoxicillin      -  Ampicillin/Sulbactam: S <=4/2 Enterobacter, Klebsiella aerogenes, Citrobacter, and Serratia may develop resistance during prolonged therapy (3-4 days)      -  Aztreonam: S <=4      -  Cefazolin: S <=2 ([id number]) For uncomplicated UTI with K. pneumoniae, E. coli, or P. mirablis: ABRAN <=16 is sensitive and ABRAN >=32 is resistant. This also predicts results for oral agents cefaclor, cefdinir, cefpodoxime, cefprozil, cefuroxime axetil, cephalexin and locarbef for uncomplicated UTI. Note that some isolates may be susceptible to these agents while testing resistant to cefazolin.      -  Cefepime: S <=2      -  Cefoxitin: S <=8      -  Ceftriaxone: S <=1 Enterobacter, Klebsiella aerogenes, Citrobacter, and Serratia may develop resistance during prolonged therapy      -  Ciprofloxacin: R >2      -  Ertapenem: S <=0.5      -  Gentamicin: S <=2      -  Imipenem: S <=1      -  Levofloxacin: R >4      -  Meropenem: S <=1      -  Nitrofurantoin: S <=32 Should not be used to treat pyelonephritis      -  Piperacillin/Tazobactam: S <=8      -  Tigecycline: S <=2      -  Tobramycin: S <=2      -  Trimethoprim/Sulfamethoxazole: S <=0.5/9.5      Method Type: ABRAN    Culture - Blood (collected 04-18-22 @ 18:20)  Source: .Blood Blood-Peripheral  Preliminary Report (04-19-22 @ 19:01):    No growth to date.    Culture - Blood (collected 04-18-22 @ 18:20)  Source: .Blood Blood-Peripheral  Preliminary Report (04-19-22 @ 19:01):    No growth to date.          RADIOLOGY & ADDITIONAL STUDIES:    CXR:    ECHO:
Patient is a 89y old  Female who presents with a chief complaint of Failure to thrive (2022 10:31)    INTERVAL HPI/OVERNIGHT EVENTS: No acute events overnight    REVIEW OF SYSTEMS: unable to assess due to pt's mental status    T(C): 36.4 (22 @ 05:39), Max: 36.9 (22 @ 11:37)  HR: 104 (22 @ 05:39) (85 - 104)  BP: 109/72 (22 @ 05:39) (109/72 - 184/79)  RR: 18 (22 @ 05:39) (16 - 18)  SpO2: 100% (22 @ 05:39) (96% - 100%)  Wt(kg): --Vital Signs Last 24 Hrs  T(C): 36.4 (2022 05:39), Max: 36.9 (2022 11:37)  T(F): 97.5 (2022 05:39), Max: 98.4 (2022 11:37)  HR: 104 (2022 05:39) (85 - 104)  BP: 109/72 (2022 05:39) (109/72 - 184/79)  BP(mean): --  RR: 18 (2022 05:39) (16 - 18)  SpO2: 100% (2022 05:39) (96% - 100%)    MEDICATIONS  (STANDING):  ATENolol  Tablet 50 milliGRAM(s) Oral daily  cefTRIAXone   IVPB 1000 milliGRAM(s) IV Intermittent every 24 hours  dextrose 5% + sodium chloride 0.45%. 1000 milliLiter(s) (75 mL/Hr) IV Continuous <Continuous>  dextrose 5%. 1000 milliLiter(s) (75 mL/Hr) IV Continuous <Continuous>  escitalopram 5 milliGRAM(s) Oral daily  lisinopril 5 milliGRAM(s) Oral daily  potassium chloride   Powder 40 milliEquivalent(s) Oral once  potassium chloride  10 mEq/100 mL IVPB 10 milliEquivalent(s) IV Intermittent every 1 hour  tamsulosin 0.4 milliGRAM(s) Oral at bedtime    MEDICATIONS  (PRN):      PHYSICAL EXAM:  GENERAL: NAD, lethargic arosable  EYES: clear conjunctiva; EOMI  ENMT: Moist mucous membranes  NECK: Supple, No JVD, Normal thyroid  CHEST/LUNG: Clear to auscultation bilaterally; No rales, rhonchi, wheezing, or rubs  HEART: S1, S2, Regular rate and rhythm  ABDOMEN: Soft, Nontender, Nondistended; Bowel sounds present  NEURO: Alert & awake  EXTREMITIES: No LE edema, no calf tenderness  LYMPH: No lymphadenopathy noted  SKIN: sacral ulcer unchangeable and bilat heel stage 1    Consultant(s) Notes Reviewed:  [x ] YES  [ ] NO  Care Discussed with Consultants/Other Providers [ x] YES  [ ] NO    LABS:                        10.3   12.16 )-----------( 317      ( 2022 09:11 )             31.4     04-    140  |  105  |  14  ----------------------------<  96  3.0<L>   |  25  |  0.52    Ca    8.4      2022 09:11  Phos  1.8     -  Mg     1.7     -    TPro  5.8<L>  /  Alb  2.2<L>  /  TBili  0.5  /  DBili  x   /  AST  15  /  ALT  14  /  AlkPhos  81  -    PT/INR - ( 2022 09:50 )   PT: 69.0 sec;   INR: 5.70 ratio         PTT - ( 2022 09:50 )  PTT:56.2 sec  CAPILLARY BLOOD GLUCOSE      POCT Blood Glucose.: 94 mg/dL (2022 10:02)  POCT Blood Glucose.: 105 mg/dL (2022 06:12)  POCT Blood Glucose.: 152 mg/dL (2022 02:48)  POCT Blood Glucose.: 195 mg/dL (2022 00:26)  POCT Blood Glucose.: 212 mg/dL (2022 16:48)  POCT Blood Glucose.: 47 mg/dL (2022 15:46)  POCT Blood Glucose.: 53 mg/dL (2022 11:46)    Urinalysis Basic - ( 2022 03:59 )    Color: Yellow / Appearance: Clear / S.010 / pH: x  Gluc: x / Ketone: Negative  / Bili: Negative / Urobili: Negative   Blood: x / Protein: Negative / Nitrite: Positive   Leuk Esterase: Negative / RBC: 0-2 /HPF / WBC 3-5 /HPF   Sq Epi: x / Non Sq Epi: Few /HPF / Bacteria: Many /HPF      RADIOLOGY & ADDITIONAL TESTS:    Imaging Personally Reviewed:  [x ] YES  [ ] NO  < from: CT Head No Cont (22 @ 19:27) >    ACC: [id number] EXAM:  CT BRAIN                          PROCEDURE DATE:  2022          INTERPRETATION:  Clinical Indication: Failure to thrive    5mm axial sections of the brain were obtained from base to vertex,   without the intravenous administration of contrast material. Coronal and   sagittal computer generated reconstructed views are available.    Comparison is made with prior CT of 3/8/2022 and demonstrates no   significant interval change. Moderate atrophy is identified with   ventricular and sulcal prominence. Fairly extensive small vessel white   matter ischemic changes are noted. There is no hemorrhage. Bone window   examination is unremarkable. There has been previous right-sided lens   replacement surgery.        IMPRESSION: No change from 3/8/2022. Moderate atrophy and small vessel   white matter ischemic changes. No hemorrhage.    --- End of Report ---            KIERAN RIBEIRO MD; Attending Radiologist  This document has been electronically signed. 2022  8:50PM    < end of copied text >  < from: Xray Chest 1 View-PORTABLE IMMEDIATE (22 @ 16:11) >    ACC: [id number] EXAM:  XR CHEST PORTABLE IMMED 1V                          PROCEDURE DATE:  2022          INTERPRETATION:  AP chest on 2022 at 3:11 PM. Patient has   sepsis.    Heart magnified by technique.    Lungs remain clear.    Chest is similar to  of this year.    IMPRESSION: Negative chest.    --- End of Report ---            BRANDY WINTERS MD; Attending Radiologist  This document has been electronically signed. 2022  4:13PM    < end of copied text >    
PULMONARY  progress note    KWAME RODRIGUEZ  [id number]    Patient is a 89y old  Female who presents with a chief complaint of Failure to thrive (2022 17:56)  no sob or fever      MEDICATIONS  (STANDING):  ATENolol  Tablet 50 milliGRAM(s) Oral daily  dextrose 5%. 1000 milliLiter(s) (75 mL/Hr) IV Continuous <Continuous>  escitalopram 5 milliGRAM(s) Oral daily  lisinopril 5 milliGRAM(s) Oral daily  potassium chloride   Powder 40 milliEquivalent(s) Oral once  potassium chloride  10 mEq/100 mL IVPB 10 milliEquivalent(s) IV Intermittent every 1 hour  tamsulosin 0.4 milliGRAM(s) Oral at bedtime            Allergies    No Known Allergies            PAST MEDICAL & SURGICAL HISTORY:  HTN (hypertension)    Pulmonary embolism    Afib    Stroke             REVIEW OF SYSTEMS: as per staff  CONSTITUTIONAL: No fever, weight loss, or fatigue   EYES: No eye pain, visual disturbances, or discharge  ENT:  No difficulty hearing, tinnitus, vertigo; No sinus or throat pain  NECK: No pain or stiffness or nodes  RESPIRATORY:  cough--   wheezing--   chills --  hemoptysis -- Shortness of Breath--  CARDIOVASCULAR: No chest pain, palpitations, passing out, dizziness, or leg swelling  GASTROINTESTINAL: No abdominal or epigastric pain. No nausea, vomiting,   GENITOURINARY: No dysuria, frequency, hematuria, or incontinence  LYMPH Nodes: No enlarged glands  ENDOCRINE: No heat or cold intolerance; No hair loss  ALLERGY AND IMMUNOLOGIC: No hives or eczema        Vital Signs Last 24 Hrs  T(C): 36.4 (2022 05:39), Max: 36.9 (2022 11:37)  T(F): 97.5 (2022 05:39), Max: 98.4 (2022 11:37)  HR: 104 (2022 05:39) (85 - 104)  BP: 109/72 (2022 05:39) (109/72 - 184/79)  BP(mean): --  RR: 18 (2022 05:39) (16 - 18)  SpO2: 100% (2022 05:39) (96% - 100%)  I&O's Detail      PHYSICAL EXAMINATION:    GENERAL: The patient is a thin w/f in no apparent distress.   SKIN no rash ecchymoses or bruises  HEENT: Head is normocephalic and atraumatic  SOY , Extraocular muscles are intact. Mucous membranes  moist.   Neck supple ,No LN felt JVP not increased  Thyroid not enlarged  Cardiovascular:  S1 S2 heard, RSR, No JVD , ejection  systolic  murmur at apex, No gallop or rub  Respiratory: Chest wall symmetrical with good air entry ,Percussion note normal,    Lungs vesicular breathing with   no  rales  or  wheeze	  ABDOMEN:  Soft, Non-tender,  no hepatomegaly or splenomegaly BS positive	  Extremities: Normal range of motion, No clubbing, cyanosis or edema  Vascular: Peripheral pulses palpable 2+ bilaterally  CNS: Non communicative  Babinski neg        LABS:                        10.3   12.16 )-----------( 317      ( 2022 09:11 )             31.4         140  |  105  |  14  ----------------------------<  96  3.0<L>   |  25  |  0.52    Ca    8.4      2022 09:11  Phos  1.8       Mg     1.7         TPro  5.8<L>  /  Alb  2.2<L>  /  TBili  0.5  /  DBili  x   /  AST  15  /  ALT  14  /  AlkPhos  81  -    PT/INR - ( 2022 09:50 )   PT: 69.0 sec;   INR: 5.70 ratio         PTT - ( 2022 09:50 )  PTT:56.2 sec  Urinalysis Basic - ( 2022 03:59 )    Color: Yellow / Appearance: Clear / S.010 / pH: x  Gluc: x / Ketone: Negative  / Bili: Negative / Urobili: Negative   Blood: x / Protein: Negative / Nitrite: Positive   Leuk Esterase: Negative / RBC: 0-2 /HPF / WBC 3-5 /HPF   Sq Epi: x / Non Sq Epi: Few /HPF / Bacteria: Many /HPF            Troponin I, High Sensitivity Result: 41.4 ng/L (22 @ 12:28)      Serum Pro-Brain Natriuretic Peptide: 2042 pg/mL (22 @ 12:28)    Lactate, Blood: 1.8 mmol/L (22 @ 22:29)  Lactate, Blood: 2.2 mmol/L (22 @ 16:30)  Lactate, Blood: 2.1 mmol/L (22 @ 12:28)      RADIOLOGY & ADDITIONAL STUDIES:    CXR: negative    CT Head; no active disease
Patient is a 89y old  Female who presents with a chief complaint of Failure to thrive (2022 10:31)    INTERVAL HPI/OVERNIGHT EVENTS: pt seen and examined    REVIEW OF SYSTEMS: unable to assess due to pt's mental status    T(C): 36.4 (22 @ 05:39), Max: 36.9 (22 @ 11:37)  HR: 104 (22 @ 05:39) (85 - 104)  BP: 109/72 (22 @ 05:39) (109/72 - 184/79)  RR: 18 (22 @ 05:39) (16 - 18)  SpO2: 100% (22 @ 05:39) (96% - 100%)  Wt(kg): --Vital Signs Last 24 Hrs  T(C): 36.4 (2022 05:39), Max: 36.9 (2022 11:37)  T(F): 97.5 (2022 05:39), Max: 98.4 (2022 11:37)  HR: 104 (2022 05:39) (85 - 104)  BP: 109/72 (2022 05:39) (109/72 - 184/79)  BP(mean): --  RR: 18 (2022 05:39) (16 - 18)  SpO2: 100% (2022 05:39) (96% - 100%)    MEDICATIONS  (STANDING):  ATENolol  Tablet 50 milliGRAM(s) Oral daily  cefTRIAXone   IVPB 1000 milliGRAM(s) IV Intermittent every 24 hours  dextrose 5% + sodium chloride 0.45%. 1000 milliLiter(s) (75 mL/Hr) IV Continuous <Continuous>  dextrose 5%. 1000 milliLiter(s) (75 mL/Hr) IV Continuous <Continuous>  escitalopram 5 milliGRAM(s) Oral daily  lisinopril 5 milliGRAM(s) Oral daily  potassium chloride   Powder 40 milliEquivalent(s) Oral once  potassium chloride  10 mEq/100 mL IVPB 10 milliEquivalent(s) IV Intermittent every 1 hour  tamsulosin 0.4 milliGRAM(s) Oral at bedtime    MEDICATIONS  (PRN):      PHYSICAL EXAM:  GENERAL: NAD, lethargic arosable  EYES: clear conjunctiva; EOMI  ENMT: Moist mucous membranes  NECK: Supple, No JVD, Normal thyroid  CHEST/LUNG: Clear to auscultation bilaterally; No rales, rhonchi, wheezing, or rubs  HEART: S1, S2, Regular rate and rhythm  ABDOMEN: Soft, Nontender, Nondistended; Bowel sounds present  NEURO: Alert & awake  EXTREMITIES: No LE edema, no calf tenderness  LYMPH: No lymphadenopathy noted  SKIN: sacral ulcer unchangeable and bilat heel stage 1    Consultant(s) Notes Reviewed:  [x ] YES  [ ] NO  Care Discussed with Consultants/Other Providers [ x] YES  [ ] NO    LABS:                        10.3   12.16 )-----------( 317      ( 2022 09:11 )             31.4     04-    140  |  105  |  14  ----------------------------<  96  3.0<L>   |  25  |  0.52    Ca    8.4      2022 09:11  Phos  1.8     -  Mg     1.7     -    TPro  5.8<L>  /  Alb  2.2<L>  /  TBili  0.5  /  DBili  x   /  AST  15  /  ALT  14  /  AlkPhos  81  -    PT/INR - ( 2022 09:50 )   PT: 69.0 sec;   INR: 5.70 ratio         PTT - ( 2022 09:50 )  PTT:56.2 sec  CAPILLARY BLOOD GLUCOSE      POCT Blood Glucose.: 94 mg/dL (2022 10:02)  POCT Blood Glucose.: 105 mg/dL (2022 06:12)  POCT Blood Glucose.: 152 mg/dL (2022 02:48)  POCT Blood Glucose.: 195 mg/dL (2022 00:26)  POCT Blood Glucose.: 212 mg/dL (2022 16:48)  POCT Blood Glucose.: 47 mg/dL (2022 15:46)  POCT Blood Glucose.: 53 mg/dL (2022 11:46)    Urinalysis Basic - ( 2022 03:59 )    Color: Yellow / Appearance: Clear / S.010 / pH: x  Gluc: x / Ketone: Negative  / Bili: Negative / Urobili: Negative   Blood: x / Protein: Negative / Nitrite: Positive   Leuk Esterase: Negative / RBC: 0-2 /HPF / WBC 3-5 /HPF   Sq Epi: x / Non Sq Epi: Few /HPF / Bacteria: Many /HPF      RADIOLOGY & ADDITIONAL TESTS:    Imaging Personally Reviewed:  [x ] YES  [ ] NO  < from: CT Head No Cont (22 @ 19:27) >    ACC: [id number] EXAM:  CT BRAIN                          PROCEDURE DATE:  2022          INTERPRETATION:  Clinical Indication: Failure to thrive    5mm axial sections of the brain were obtained from base to vertex,   without the intravenous administration of contrast material. Coronal and   sagittal computer generated reconstructed views are available.    Comparison is made with prior CT of 3/8/2022 and demonstrates no   significant interval change. Moderate atrophy is identified with   ventricular and sulcal prominence. Fairly extensive small vessel white   matter ischemic changes are noted. There is no hemorrhage. Bone window   examination is unremarkable. There has been previous right-sided lens   replacement surgery.        IMPRESSION: No change from 3/8/2022. Moderate atrophy and small vessel   white matter ischemic changes. No hemorrhage.    --- End of Report ---            KIERAN RIBEIRO MD; Attending Radiologist  This document has been electronically signed. 2022  8:50PM    < end of copied text >  < from: Xray Chest 1 View-PORTABLE IMMEDIATE (22 @ 16:11) >    ACC: [id number] EXAM:  XR CHEST PORTABLE IMMED 1V                          PROCEDURE DATE:  2022          INTERPRETATION:  AP chest on 2022 at 3:11 PM. Patient has   sepsis.    Heart magnified by technique.    Lungs remain clear.    Chest is similar to  of this year.    IMPRESSION: Negative chest.    --- End of Report ---            BRANDY WINTERS MD; Attending Radiologist  This document has been electronically signed. 2022  4:13PM    < end of copied text >    
PULMONARY  progress note    KWAME RODRIGUEZ  [id number]    Patient is a 89y old  Female who presents with a chief complaint of Failure to thrive (2022 08:38)  No cough or sob,       MEDICATIONS  (STANDING):  ATENolol  Tablet 50 milliGRAM(s) Oral daily  cefTRIAXone   IVPB 1000 milliGRAM(s) IV Intermittent every 24 hours  collagenase Ointment 1 Application(s) Topical daily  dextrose 5% + sodium chloride 0.45%. 1000 milliLiter(s) (75 mL/Hr) IV Continuous <Continuous>  dextrose 5%. 1000 milliLiter(s) (75 mL/Hr) IV Continuous <Continuous>  escitalopram 5 milliGRAM(s) Oral daily  lisinopril 5 milliGRAM(s) Oral daily  tamsulosin 0.4 milliGRAM(s) Oral at bedtime          PAST MEDICAL & SURGICAL HISTORY:  HTN (hypertension)    Pulmonary embolism    Afib    Stroke             Vital Signs Last 24 Hrs  T(C): 36.2 (2022 05:08), Max: 36.6 (2022 21:43)  T(F): 97.1 (2022 05:08), Max: 97.8 (2022 21:43)  HR: 95 (2022 05:08) (94 - 96)  BP: 109/73 (2022 05:08) (103/64 - 118/65)  BP(mean): --  RR: 17 (2022 05:08) (16 - 18)  SpO2: 97% (2022 05:08) (97% - 100%)  I&O's Detail    2022 07:01  -  2022 07:00  --------------------------------------------------------  IN:  Total IN: 0 mL    OUT:    Indwelling Catheter - Urethral (mL): 750 mL    Voided (mL): 150 mL  Total OUT: 900 mL    Total NET: -900 mL          PHYSICAL EXAMINATION:    GENERAL: The patient is a thin w/f in no apparent distress.   SKIN no rash ecchymoses or bruises  HEENT: Head is normocephalic and atraumatic  SOY , Extraocular muscles are intact. Mucous membranes  moist.   Neck supple ,No LN felt JVP not increased  Thyroid not enlarged  Cardiovascular:  S1 S2 heard, RSR, No JVD , systolic  murmur at apex, No gallop or rub  Respiratory: Chest wall symmetrical with good air entry ,Percussion note normal,    Lungs vesicular breathing with no   rales or   wheeze	  ABDOMEN:  Soft, Non-tender,  no hepatomegaly or splenomegaly BS positive	  Extremities: Normal range of motion, No clubbing, cyanosis or edema  Vascular: Peripheral pulses palpable 2+ bilaterally  CNS:  Awake, non communicative   Moves ext       LABS:                        9.1    10.33 )-----------( 272      ( 2022 09:22 )             27.3         140  |  108  |  9   ----------------------------<  99  3.7   |  23  |  0.51    Ca    8.4      2022 09:22  Phos  1.8       Mg     1.7         TPro  5.8<L>  /  Alb  2.2<L>  /  TBili  0.5  /  DBili  x   /  AST  15  /  ALT  14  /  AlkPhos  81      PT/INR - ( 2022 09:22 )   PT: 76.7 sec;   INR: 6.33 ratio         PTT - ( 2022 09:22 )  PTT:50.2 sec  Urinalysis Basic - ( 2022 03:59 )    Color: Yellow / Appearance: Clear / S.010 / pH: x  Gluc: x / Ketone: Negative  / Bili: Negative / Urobili: Negative   Blood: x / Protein: Negative / Nitrite: Positive   Leuk Esterase: Negative / RBC: 0-2 /HPF / WBC 3-5 /HPF   Sq Epi: x / Non Sq Epi: Few /HPF / Bacteria: Many /HPF            Troponin I, High Sensitivity Result: 41.4 ng/L (22 @ 12:28)      Serum Pro-Brain Natriuretic Peptide: 2042 pg/mL (22 @ 12:28)      Procalcitonin, Serum: 0.10 ng/mL (22 @ 12:58)        MICROBIOLOGY:    Culture - Urine (collected 22 @ 06:00)  Source: Clean Catch Clean Catch (Midstream)  Preliminary Report (22 @ 10:19):    >100,000 CFU/ml Escherichia coli    Culture - Blood (collected 22 @ 18:20)  Source: .Blood Blood-Peripheral  Preliminary Report (22 @ 19:01):    No growth to date.    Culture - Blood (collected 22 @ 18:20)  Source: .Blood Blood-Peripheral  Preliminary Report (22 @ 19:01):    No growth to date.        
PATIENT SEEN AND EXAMINED ON :- 2022  DATE OF SERVICE:  22           Interim events noted,Labs ,Radiological studies and Cardiology tests reviewed .   HOSPITAL COURSE: HPI:  89 yo F, from home, AAOx2-3 & ambulates w/ assistance at baseline (per prior records), PMHx of HTN, CVA, PE, Afib was sent for hypoxia. Pt assessed at bedside AxO1, knows her , and aware to self. Pt doesn't know why she is in the hospital but denied any chest pain, sob, N/V/D. Per NH papers pt was hypoxic to 84% on RA and refusing to eat and drink for 5 days and on IV fluids.     Of note pt recently admitted to [facility] for AMS and was diagnosed with acute /subacute infarct on MRI brain. Pt was started on full dose AC Coumadin given multiple strokes and hx of Afib (she was not on AC previously due to falls)     ED course: Chest x-ray without any abnormality . Na 147, Lactate 2.1, FS 53, ProBNP , EKG NSR.     MAR assessed pt's O2 saturation on left hand finger(non cyanotic finger) and also confirmed her O2 sat with pulse ox on forehead and both time pt was 100% on RA (2022 15:12)      INTERIM EVENTS:Patient seen at bedside ,interim events noted.      PMH -reviewed admission note, no change since admission  HEART FAILURE: Acute[ ]Chronic[ ] Systolic[ ] Diastolic[ ] Combined Systolic and Diastolic[ ]  CAD[ ] CABG[ ] PCI[ ]  DEVICES[ ] PPM[ ] ICD[ ] ILR[ ]  ATRIAL FIBRILLATION[ ] Paroxysmal[ ] Permanent[ ]  MAN[ ] CKD1[ ] CKD2[ ] CKD3[ ] CKD4[ ] ESRD[ ]  COPD[ ] HTN[ ]   DM[ ] Type1[ ] Type 2[ ]   CVA[ ] Paresis[ ]    AMBULATION: Assisted[ ] Cane/walker[ ] Independent[ ]    MEDICATIONS  (STANDING):  ATENolol  Tablet 50 milliGRAM(s) Oral daily  cefTRIAXone   IVPB 1000 milliGRAM(s) IV Intermittent every 24 hours  collagenase Ointment 1 Application(s) Topical daily  dextrose 5% + sodium chloride 0.45%. 1000 milliLiter(s) (65 mL/Hr) IV Continuous <Continuous>  dextrose 5% + sodium chloride 0.45%. 1000 milliLiter(s) (75 mL/Hr) IV Continuous <Continuous>  dextrose 5%. 1000 milliLiter(s) (75 mL/Hr) IV Continuous <Continuous>  escitalopram 5 milliGRAM(s) Oral daily  lisinopril 5 milliGRAM(s) Oral daily  tamsulosin 0.4 milliGRAM(s) Oral at bedtime    MEDICATIONS  (PRN):            REVIEW OF SYSTEMS:  Constitutional: [ ] fever, [ ]weight loss,  [ ]fatigue  Eyes: [ ] visual changes  Respiratory: [ ]shortness of breath;  [ ] cough, [ ]wheezing, [ ]chills, [ ]hemoptysis  Cardiovascular: [ ] chest pain, [ ]palpitations, [ ]dizziness,  [ ]leg swelling[ ]orthopnea[ ]PND  Gastrointestinal: [ ] abdominal pain, [ ]nausea, [ ]vomiting,  [ ]diarrhea [ ]Constipation [ ]Melena  Genitourinary: [ ] dysuria, [ ] hematuria [ ]Land  Neurologic: [ ] headaches [ ] tremors[ ]weakness [ ]Paralysis Right[ ] Left[ ]  Skin: [ ] itching, [ ]burning, [ ] rashes  Endocrine: [ ] heat or cold intolerance  Musculoskeletal: [ ] joint pain or swelling; [ ] muscle, back, or extremity pain  Psychiatric: [ ] depression, [ ]anxiety, [ ]mood swings, or [ ]difficulty sleeping  Hematologic: [ ] easy bruising, [ ] bleeding gums    [ ] All remaining systems negative except as per above.   [ ]Unable to obtain.  [x] No change in ROS since admission      Vital Signs Last 24 Hrs  T(C): 36.8 (2022 12:01), Max: 36.8 (2022 12:01)  T(F): 98.2 (2022 12:01), Max: 98.2 (2022 12:01)  HR: 103 (2022 12:) (95 - 103)  BP: 117/69 (2022 12:) (109/73 - 118/65)  BP(mean): --  RR: 17 (2022 12:) (16 - 17)  SpO2: 97% (2022 12:) (97% - 97%)  I&O's Summary    2022 07:01  -  2022 07:00  --------------------------------------------------------  IN: 0 mL / OUT: 900 mL / NET: -900 mL        PHYSICAL EXAM:  General: No acute distress BMI-  HEENT: EOMI, PERRL  Neck: Supple, [ ] JVD  Lungs: Equal air entry bilaterally; [ ] rales [ ] wheezing [ ] rhonchi  Heart: Regular rate and rhythm; [x ] murmur   2/6 [ x] systolic [ ] diastolic [ ] radiation[ ] rubs [ ]  gallops  Abdomen: Nontender, bowel sounds present  Extremities: No clubbing, cyanosis, [ ] edema [ ]Pulses  equal and intact  Nervous system:  Alert & Oriented X3, no focal deficits  Psychiatric: Normal affect  Skin: No rashes or lesions    LABS:      140  |  108  |  9   ----------------------------<  99  3.7   |  23  |  0.51    Ca    8.4      2022 09:22  Phos  1.8     04-19  Mg     1.7     04-19    TPro  5.8<L>  /  Alb  2.2<L>  /  TBili  0.5  /  DBili  x   /  AST  15  /  ALT  14  /  AlkPhos  81  04-19    Creatinine Trend: 0.51<--, 0.52<--, 0.70<--                        9.1    10.33 )-----------( 272      ( 2022 09:22 )             27.3     PT/INR - ( 2022 09:22 )   PT: 76.7 sec;   INR: 6.33 ratio         PTT - ( :22 )  PTT:50.2 sec          
Patient is a 89y old  Female who presents with a chief complaint of Failure to thrive (2022 11:35)    INTERVAL HPI/OVERNIGHT EVENTS: No acute events overnight, lethargic, arousable    REVIEW OF SYSTEMS:    T(C): 36.2 (22 @ 05:08), Max: 36.6 (22 @ 21:43)  HR: 95 (22 @ 05:08) (94 - 96)  BP: 109/73 (22 @ 05:08) (103/64 - 118/65)  RR: 17 (22 @ 05:08) (16 - 18)  SpO2: 97% (22 @ 05:08) (97% - 100%)  Wt(kg): --Vital Signs Last 24 Hrs  T(C): 36.2 (2022 05:08), Max: 36.6 (2022 21:43)  T(F): 97.1 (2022 05:08), Max: 97.8 (2022 21:43)  HR: 95 (2022 05:08) (94 - 96)  BP: 109/73 (2022 05:08) (103/64 - 118/65)  BP(mean): --  RR: 17 (2022 05:08) (16 - 18)  SpO2: 97% (2022 05:08) (97% - 100%)    PHYSICAL EXAM:  GENERAL: NAD, lethargic  EYES: clear conjunctiva; EOMI  ENMT: Moist mucous membranes  NECK: Supple, No JVD, Normal thyroid  CHEST/LUNG: diminished breath sounds bilat  HEART: S1, S2, Regular rate and rhythm  ABDOMEN: Soft, Nontender, Nondistended; Bowel sounds present  NEURO: lethargic, opens eyes to verbal commands  EXTREMITIES: No LE edema, no calf tenderness  LYMPH: No lymphadenopathy noted  SKIN: sacral and bilat heel ulcer  : rubio wth clear yellow urine    Consultant(s) Notes Reviewed:  [x ] YES  [ ] NO  Care Discussed with Consultants/Other Providers [ x] YES  [ ] NO    LABS:                        10.3   12.16 )-----------( 317      ( 2022 09:11 )             31.4     04-    140  |  105  |  14  ----------------------------<  96  3.0<L>   |  25  |  0.52    Ca    8.4      2022 09:11  Phos  1.8       Mg     1.7         TPro  5.8<L>  /  Alb  2.2<L>  /  TBili  0.5  /  DBili  x   /  AST  15  /  ALT  14  /  AlkPhos  81      PT/INR - ( 2022 09:50 )   PT: 69.0 sec;   INR: 5.70 ratio         PTT - ( 2022 09:50 )  PTT:56.2 sec  CAPILLARY BLOOD GLUCOSE      POCT Blood Glucose.: 134 mg/dL (2022 06:33)  POCT Blood Glucose.: 113 mg/dL (2022 03:02)  POCT Blood Glucose.: 105 mg/dL (2022 22:05)  POCT Blood Glucose.: 125 mg/dL (2022 16:59)  POCT Blood Glucose.: 107 mg/dL (2022 13:47)  POCT Blood Glucose.: 94 mg/dL (2022 10:02)    Urinalysis Basic - ( 2022 03:59 )    Color: Yellow / Appearance: Clear / S.010 / pH: x  Gluc: x / Ketone: Negative  / Bili: Negative / Urobili: Negative   Blood: x / Protein: Negative / Nitrite: Positive   Leuk Esterase: Negative / RBC: 0-2 /HPF / WBC 3-5 /HPF   Sq Epi: x / Non Sq Epi: Few /HPF / Bacteria: Many /HPF      RADIOLOGY & ADDITIONAL TESTS:    Imaging Personally Reviewed:  [x ] YES  [ ] NO  < from: CT Head No Cont (22 @ 19:27) >  ACC: [id number] EXAM:  CT BRAIN                          PROCEDURE DATE:  2022          INTERPRETATION:  Clinical Indication: Failure to thrive    5mm axial sections of the brain were obtained from base to vertex,   without the intravenous administration of contrast material. Coronal and   sagittal computer generated reconstructed views are available.    Comparison is made with prior CT of 3/8/2022 and demonstrates no   significant interval change. Moderate atrophy is identified with   ventricular and sulcal prominence. Fairly extensive small vessel white   matter ischemic changes are noted. There is no hemorrhage. Bone window   examination is unremarkable. There has been previous right-sided lens   replacement surgery.        IMPRESSION: No change from 3/8/2022. Moderate atrophy and small vessel   white matter ischemic changes. No hemorrhage.    --- End of Report ---            KIERAN RIBEIRO MD; Attending Radiologist  This document has been electronically signed. 2022  8:50PM    < end of copied text >  < from: Xray Chest 1 View-PORTABLE IMMEDIATE (22 @ 16:11) >  ACC: [id number] EXAM:  XR CHEST PORTABLE IMMED 1V                          PROCEDURE DATE:  2022          INTERPRETATION:  AP chest on 2022 at 3:11 PM. Patient has   sepsis.    Heart magnified by technique.    Lungs remain clear.    Chest is similar to  of this year.    IMPRESSION: Negative chest.    --- End of Report ---            BRANDY WINTERS MD; Attending Radiologist  This document has been electronically signed. 2022  4:13PM    < end of copied text >  < from: Xray Chest 1 View- PORTABLE-Routine (Xray Chest 1 View- PORTABLE-Routine .) (22 @ 14:06) >    ACC: [id number] EXAM:  XR CHEST PORTABLE ROUTINE 1V                          PROCEDURE DATE:  2022          INTERPRETATION:  Follow-up.    AP chest. Prior 3/4/2022.    IMPRESSION: No change heart mediastinum. Bilateral calcified hilar lymph   nodes. No consolidation pneumothorax effusion or other interval change.    --- End of Report ---            EFREM DYER MD; Attending Radiologist  This document has been electronically signed. Mar 14 2022  2:17PM    < end of copied text >    
Patient is a 89y old  Female who presents with a chief complaint of Failure to thrive (19 Apr 2022 11:35)    INTERVAL HPI/OVERNIGHT EVENTS: No acute events overnight, lethargic, arousable    MEDICATIONS  (STANDING):  ATENolol  Tablet 50 milliGRAM(s) Oral daily  cefTRIAXone   IVPB 1000 milliGRAM(s) IV Intermittent every 24 hours  collagenase Ointment 1 Application(s) Topical daily  dextrose 5% + sodium chloride 0.45%. 1000 milliLiter(s) (65 mL/Hr) IV Continuous <Continuous>  dextrose 5% + sodium chloride 0.45%. 1000 milliLiter(s) (75 mL/Hr) IV Continuous <Continuous>  dextrose 5%. 1000 milliLiter(s) (75 mL/Hr) IV Continuous <Continuous>  escitalopram 5 milliGRAM(s) Oral daily  lisinopril 5 milliGRAM(s) Oral daily  tamsulosin 0.4 milliGRAM(s) Oral at bedtime    MEDICATIONS  (PRN):    Vital Signs Last 24 Hrs  T(C): 37.1 (04-20-22 @ 21:19), Max: 37.1 (04-20-22 @ 21:19)  T(F): 98.7 (04-20-22 @ 21:19), Max: 98.7 (04-20-22 @ 21:19)  HR: 99 (04-20-22 @ 21:19) (95 - 103)  BP: 116/70 (04-20-22 @ 21:19) (109/73 - 117/69)  BP(mean): --  RR: 18 (04-20-22 @ 21:19) (17 - 18)  SpO2: 95% (04-20-22 @ 21:19) (95% - 97%)      cvs-s1, s2+  rs- dec breath sounds at bases  abd - soft , bs+  ext - no edema    Vital Signs Last 24 Hrs  T(C): 37.1 (04-20-22 @ 21:19), Max: 37.1 (04-20-22 @ 21:19)  T(F): 98.7 (04-20-22 @ 21:19), Max: 98.7 (04-20-22 @ 21:19)  HR: 99 (04-20-22 @ 21:19) (95 - 103)  BP: 116/70 (04-20-22 @ 21:19) (109/73 - 117/69)  BP(mean): --  RR: 18 (04-20-22 @ 21:19) (17 - 18)  SpO2: 95% (04-20-22 @ 21:19) (95% - 97%)    Imaging Personally Reviewed:  [x ] YES  [ ] NO  < from: CT Head No Cont (04.18.22 @ 19:27) >  ACC: [id number] EXAM:  CT BRAIN                          PROCEDURE DATE:  04/18/2022          INTERPRETATION:  Clinical Indication: Failure to thrive    5mm axial sections of the brain were obtained from base to vertex,   without the intravenous administration of contrast material. Coronal and   sagittal computer generated reconstructed views are available.    Comparison is made with prior CT of 3/8/2022 and demonstrates no   significant interval change. Moderate atrophy is identified with   ventricular and sulcal prominence. Fairly extensive small vessel white   matter ischemic changes are noted. There is no hemorrhage. Bone window   examination is unremarkable. There has been previous right-sided lens   replacement surgery.        IMPRESSION: No change from 3/8/2022. Moderate atrophy and small vessel   white matter ischemic changes. No hemorrhage.    --- End of Report ---            KIERAN RIBEIRO MD; Attending Radiologist  This document has been electronically signed. Apr 18 2022  8:50PM    < end of copied text >  < from: Xray Chest 1 View-PORTABLE IMMEDIATE (04.18.22 @ 16:11) >  ACC: [id number] EXAM:  XR CHEST PORTABLE IMMED 1V                          PROCEDURE DATE:  04/18/2022          INTERPRETATION:  AP chest on April 18, 2022 at 3:11 PM. Patient has   sepsis.    Heart magnified by technique.    Lungs remain clear.    Chest is similar to March 14 of this year.    IMPRESSION: Negative chest.    --- End of Report ---            BRANDY WINTERS MD; Attending Radiologist  This document has been electronically signed. Apr 18 2022  4:13PM    < end of copied text >  < from: Xray Chest 1 View- PORTABLE-Routine (Xray Chest 1 View- PORTABLE-Routine .) (03.14.22 @ 14:06) >    ACC: [id number] EXAM:  XR CHEST PORTABLE ROUTINE 1V                          PROCEDURE DATE:  03/14/2022          INTERPRETATION:  Follow-up.    AP chest. Prior 3/4/2022.    IMPRESSION: No change heart mediastinum. Bilateral calcified hilar lymph   nodes. No consolidation pneumothorax effusion or other interval change.    --- End of Report ---            EFREM DYER MD; Attending Radiologist  This document has been electronically signed. Mar 14 2022  2:17PM    < end of copied text >

## 2022-04-21 NOTE — DISCHARGE NOTE PROVIDER - NSDCMRMEDTOKEN_GEN_ALL_CORE_FT
atenolol 50 mg oral tablet: 1 tab(s) orally once a day  collagenase 250 units/g topical ointment: 1 application topically once a day  Lasix 20 mg oral tablet: 1 tab(s) orally once a day  Lexapro 5 mg oral tablet: 1 tab(s) orally once a day  lisinopril 5 mg oral tablet: 1 tab(s) orally once a day  Tylenol 325 mg oral tablet: 2 tab(s) orally every 6 hours

## 2022-04-21 NOTE — DISCHARGE NOTE PROVIDER - NSDCCPCAREPLAN_GEN_ALL_CORE_FT
PRINCIPAL DISCHARGE DIAGNOSIS  Diagnosis: Acute respiratory failure with hypoxia  Assessment and Plan of Treatment: YOu presentd  from NH for hypoxia 85% RA, However you have beed saturaing well on RA here. Chest x-ray without any abnormality. Head CT with  no acute findings. EKG NSR , trop neg, blood cultures negative,  You were follwd by pulmonologist. YOu recent echo was unremarkale.  YOur respiration has been sable since you have been here.         SECONDARY DISCHARGE DIAGNOSES  Diagnosis: Bacterial UTI  Assessment and Plan of Treatment: YOu were treatred by antibioitic    Diagnosis: Supratherapeutic INR  Assessment and Plan of Treatment: YOu have been taking comadin , it was held due to elevated INR. it has been trending up, however since your progonosis is very poor, YOur health care proxy has decided with cofort care and no more labs to be drawn.    Diagnosis: Adult failure to thrive  Assessment and Plan of Treatment: You presented wih decrased PO intake for 6 days, was on IVF at the nursing home  - discussed with son, does not want to peruse peg at this time  -Cont home med escitalopram ( was started last admission)  YOu were seen by speech and swallow eval for failed dysphagia- and was start dysphagia diet. HOever you havenet been eating here. you were hydrated with IVF. YOu were seen by Palliative consult for functional deconditioning and have chosed to be comfort care. no more labs. YOu are beign dicharged to rehab with Long term care with hospice       PRINCIPAL DISCHARGE DIAGNOSIS  Diagnosis: Acute respiratory failure with hypoxia  Assessment and Plan of Treatment: YOu presentd  from NH for hypoxia 85% RA, However you have beed saturaing well on RA here. Chest x-ray without any abnormality. Head CT with  no acute findings. EKG NSR , trop neg, blood cultures negative,  You were follwd by pulmonologist. YOu recent echo was unremarkale.  YOur respiration has been sable since you have been here.         SECONDARY DISCHARGE DIAGNOSES  Diagnosis: Bacterial UTI  Assessment and Plan of Treatment: YOu were treatred by antibioitic    Diagnosis: Supratherapeutic INR  Assessment and Plan of Treatment: YOu have been taking comadin , it was held due to elevated INR. it has been trending up, however since your progonosis is very poor, YOur health care proxy has decided with cofort care and no more labs to be drawn.    Diagnosis: Adult failure to thrive  Assessment and Plan of Treatment: You presented wih decrased PO intake for 6 days, was on IVF at the nursing home  - discussed with son, does not want to peruse peg at this time  -Cont home med escitalopram ( was started last admission)  YOu were seen by speech and swallow eval for failed dysphagia- and was start dysphagia diet. HOever you havenet been eating here. you were hydrated with IVF. YOu were seen by Palliative consult for functional deconditioning and have chosed to be comfort care. no more labs. YOu are beign dicharged to rehab with Long term care with hospice      Diagnosis: Encounter for wound care  Assessment and Plan of Treatment: Clean all wounds with normal saline and apply skin prep to the surrounding skin  -Apply Collagenase ointment to the slough areas of the Coccyx wound, apply saline moistened gauze to the wound bed, and cover with a Foam dressing Daily PRN  -Elevate/float the patients heels using heel protectors and reposition the patient Q 2hrs using wedges or pillows

## 2022-04-21 NOTE — DISCHARGE NOTE NURSING/CASE MANAGEMENT/SOCIAL WORK - PATIENT PORTAL LINK FT
You can access the FollowMyHealth Patient Portal offered by [facility] by registering at the following website: [email or web address]. By joining [employer]’s FollowMyHealth portal, you will also be able to view your health information using other applications (apps) compatible with our system.

## 2022-04-21 NOTE — PROGRESS NOTE ADULT - TIME BILLING
I have examined pt personally Hx chart lab and xrays reviewed and pt discussed with  Staff and
- Review of records, telemetry, vital signs and daily labs.   - General and cardiovascular physical examination.  - Generation of cardiovascular treatment plan.  - Coordination of care.      Patient was seen and examined by me on 4/20/2022,interim events noted,labs and radiology studies reviewed.  Stephon Garcia MD,FACC.  [address] [place], [place] [id number].  [phone]

## 2022-04-21 NOTE — DISCHARGE NOTE PROVIDER - CARE PROVIDER_API CALL
Denzel Judge (MD)  [place] [address]  Phone: [phone]  Fax: [phone]  Follow Up Time: 1 week

## 2022-04-21 NOTE — PROGRESS NOTE ADULT - PROVIDER SPECIALTY LIST ADULT
Pulmonology
Internal Medicine
Pulmonology
Pulmonology
Internal Medicine
Cardiology
Internal Medicine
Internal Medicine

## 2022-04-21 NOTE — PROGRESS NOTE ADULT - ASSESSMENT
89 yo F, from nursing home, PMHx of HTN, CVA, PE, Afib was sent for hypoxia at the nursing home and  appetite for 5 days. CT head and chest x-ray with no acute findings. Pulm on board. Found to have UA + nitrite with leucocytosis and lactate. Started on ceftriaxone. Will follow up Urine and blood culture results. Pt was also found to have supratherepeutic INR, Coumadin held for now and was placed on Lovenox . Pt failed dysphagia screening, speech and swallow eval requested with palliative consult for functional deconditioning 
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  
89 yo F, from nursing home, PMHx of HTN, CVA, PE, Afib was sent for hypoxia at the nursing home and  appetite for 5 days. CT head and chest x-ray with no acute findings. Pulm on board. Found to have UA + nitrite with leucocytosis and lactate. Started on ceftriaxone. Will follow up Urine and blood culture results. Pt was also found to have supratherepeutic INR, Coumadin held for now and was placed on Lovenox . Pt failed dysphagia screening, speech and swallow eval requested with palliative consult for functional deconditioning 

## 2022-04-21 NOTE — DISCHARGE NOTE PROVIDER - HOSPITAL COURSE
89 yo F, from nursing home, PMHx of HTN, CVA, PE, Afib was sent for hypoxia at the nursing home and  appetite for 5 days. CT head and chest x-ray with no acute findings. Pulm on board. Found to have UA + nitrite with leucocytosis and lactate. Started on ceftriaxone. Will follow up Urine and blood culture results. Pt was also found to have supratherepeutic INR, Coumadin held for now and was placed on Lovenox . Pt failed dysphagia screening, speech and swallow eval requested with palliative consult for functional deconditioning. Pt is now comfort care. no more labs Pt is for LTC with hospice.  Please note that this a brief summary of hospital course please refer to daily progress notes and consult notes for full course and events      89 yo F, from nursing home, PMHx of HTN, CVA, PE, Afib was sent for hypoxia at the nursing home and  appetite for 5 days. CT head and chest x-ray with no acute findings. Pulm on board. Found to have UA + nitrite with leucocytosis and lactate. Started on ceftriaxone. Will follow up Urine and blood culture results. Pt was also found to have supratherepeutic INR, Coumadin held for now and was placed on Lovenox . Pt failed dysphagia screening, speech and swallow eval requested with palliative consult for functional deconditioning. Pt is now comfort care. no more labs Pt is for LTC with hospice.  Please note that this a brief summary of hospital course please refer to daily progress notes and consult notes for full course and events         -Clean all wounds with normal saline and apply skin prep to the surrounding skin  -Apply Collagenase ointment to the slough areas of the Coccyx wound, apply saline moistened gauze to the wound bed, and cover with a Foam dressing Daily PRN  -Elevate/float the patients heels using heel protectors and reposition the patient Q 2hrs using wedges or pillows

## 2022-04-23 LAB
CULTURE RESULTS: SIGNIFICANT CHANGE UP
CULTURE RESULTS: SIGNIFICANT CHANGE UP
SPECIMEN SOURCE: SIGNIFICANT CHANGE UP
SPECIMEN SOURCE: SIGNIFICANT CHANGE UP

## 2022-11-04 NOTE — PATIENT PROFILE ADULT - FUNCTIONAL ASSESSMENT - BASIC MOBILITY 4.
1 = Total assistance Cartilage Graft Text: The defect edges were debeveled with a #15 scalpel blade.  Given the location of the defect, shape of the defect, the fact the defect involved a full thickness cartilage defect a cartilage graft was deemed most appropriate.  An appropriate donor site was identified, cleansed, and anesthetized. The cartilage graft was then harvested and transferred to the recipient site, oriented appropriately and then sutured into place.  The secondary defect was then repaired using a primary closure.

## 2023-11-27 NOTE — ED PROVIDER NOTE - UNABLE TO OBTAIN
Have You Ever Had A Swollen Joint?: No
Negative Screening Text: A score of less than 3 is considered a negative PEST score.
Positive Screening Text: A score of 3 or greater is considered a positive PEST score.
Detail Level: Simple
Dementia
